# Patient Record
Sex: MALE | Race: WHITE | NOT HISPANIC OR LATINO | Employment: OTHER | URBAN - METROPOLITAN AREA
[De-identification: names, ages, dates, MRNs, and addresses within clinical notes are randomized per-mention and may not be internally consistent; named-entity substitution may affect disease eponyms.]

---

## 2017-01-12 ENCOUNTER — TELEPHONE (OUTPATIENT)
Dept: INTERNAL MEDICINE | Facility: CLINIC | Age: 82
End: 2017-01-12

## 2017-01-12 RX ORDER — OXYBUTYNIN CHLORIDE 10 MG/1
10 TABLET, EXTENDED RELEASE ORAL DAILY
Qty: 30 TABLET | Refills: 4 | Status: SHIPPED | OUTPATIENT
Start: 2017-01-12 | End: 2017-02-11

## 2017-01-12 RX ORDER — MELOXICAM 15 MG/1
15 TABLET ORAL DAILY
Qty: 30 TABLET | Refills: 3 | Status: SHIPPED | OUTPATIENT
Start: 2017-01-12 | End: 2018-08-25 | Stop reason: SDUPTHER

## 2017-01-12 NOTE — TELEPHONE ENCOUNTER
----- Message from Ana Reyna sent at 1/12/2017  9:09 AM EST -----  Contact: DAYANARA FRASER PH:[phone]  DAYANARA HOLDER CALLING FOR A REFILL FOR OXYBUTYNIN, AND MELOXICAM. HE IS COMPLETELY OUT OF THE OXYBUTYNIN AND WOULD LIKE TO HAVE THIS FILLED TODAY IF POSSIBLE. HE USES THE [facility] by [facility] ON [facility]. HE CAN BE REACHED [phone]

## 2017-01-13 RX ORDER — MELOXICAM 15 MG/1
TABLET ORAL
Qty: 30 TABLET | Refills: 2 | Status: SHIPPED | OUTPATIENT
Start: 2017-01-13 | End: 2017-01-24

## 2017-01-20 RX ORDER — PSEUDOEPHEDRINE HYDROCHLORIDE 120 MG/1
TABLET, FILM COATED, EXTENDED RELEASE ORAL
Qty: 60 TABLET | Refills: 4 | Status: SHIPPED | OUTPATIENT
Start: 2017-01-20 | End: 2017-01-24

## 2017-01-24 ENCOUNTER — OFFICE VISIT (OUTPATIENT)
Dept: INTERNAL MEDICINE | Facility: CLINIC | Age: 82
End: 2017-01-24

## 2017-01-24 VITALS
HEART RATE: 78 BPM | TEMPERATURE: 98.4 F | WEIGHT: 189.4 LBS | HEIGHT: 73 IN | OXYGEN SATURATION: 98 % | RESPIRATION RATE: 28 BRPM | DIASTOLIC BLOOD PRESSURE: 78 MMHG | BODY MASS INDEX: 25.1 KG/M2 | SYSTOLIC BLOOD PRESSURE: 162 MMHG

## 2017-01-24 DIAGNOSIS — J20.9 ACUTE BRONCHITIS, UNSPECIFIED ORGANISM: Primary | ICD-10-CM

## 2017-01-24 PROCEDURE — 99213 OFFICE O/P EST LOW 20 MIN: CPT | Performed by: PHYSICIAN ASSISTANT

## 2017-01-24 PROCEDURE — 94640 AIRWAY INHALATION TREATMENT: CPT | Performed by: PHYSICIAN ASSISTANT

## 2017-01-24 RX ORDER — LEVALBUTEROL INHALATION SOLUTION 0.63 MG/3ML
0.63 SOLUTION RESPIRATORY (INHALATION) EVERY 6 HOURS PRN
Status: DISCONTINUED | OUTPATIENT
Start: 2017-01-24 | End: 2022-01-01

## 2017-01-24 RX ORDER — METHYLPREDNISOLONE 4 MG/1
TABLET ORAL
Qty: 21 TABLET | Refills: 0 | Status: SHIPPED | OUTPATIENT
Start: 2017-01-24 | End: 2017-04-26

## 2017-01-24 RX ORDER — AZITHROMYCIN 250 MG/1
TABLET, FILM COATED ORAL
Qty: 6 TABLET | Refills: 0 | Status: SHIPPED | OUTPATIENT
Start: 2017-01-24 | End: 2017-04-26

## 2017-01-24 NOTE — PROGRESS NOTES
Subjective   Cooper Covarrubias is a 85 y.o. male.   Chief Complaint   Patient presents with   • Cough     Patient states symptoms started two days ago       History of Present Illness     Pt here with a cough which started 2 days ago.  He takes advair twice daily and incruse for COPD.  He sees Dr. Shah (pulmonologist).  He denies body aches, sore throat, ear pain, fever or chills.  Feels like he can't get a deep breath.     The following portions of the patient's history were reviewed and updated as appropriate: allergies, current medications, past family history, past medical history, past social history, past surgical history and problem list.    Review of Systems   Constitutional: Negative.    HENT: Negative.    Respiratory: Positive for cough and wheezing.    Cardiovascular: Negative.    Gastrointestinal: Negative.    Genitourinary: Negative.    Musculoskeletal: Negative.    Neurological: Negative.    Psychiatric/Behavioral: Negative.        Objective   Physical Exam   Constitutional: He is oriented to person, place, and time. He appears well-developed and well-nourished.   HENT:   Right Ear: External ear normal.   Left Ear: External ear normal.   Neck: Normal range of motion. Neck supple.   Cardiovascular: Normal rate, regular rhythm and normal heart sounds.    Pulmonary/Chest: Effort normal. No respiratory distress. He has wheezes.   Neurological: He is alert and oriented to person, place, and time.   Psychiatric: He has a normal mood and affect. Judgment normal.   Vitals reviewed.    Patient was given xopenex nebulizer in office today and feels better.    Assessment/Plan   Cooper was seen today for cough.    Diagnoses and all orders for this visit:    Acute bronchitis, unspecified organism  -     azithromycin (ZITHROMAX) 250 MG tablet; Take 2 tablets the first day, then 1 tablet daily for 4 days.  -     MethylPREDNISolone (MEDROL, EDEL,) 4 MG tablet; Take as directed on package instructions.  -     levalbuterol  (XOPENEX) nebulizer solution 0.63 mg; Take 3 mL by nebulization Every 6 (Six) Hours As Needed for wheezing.

## 2017-01-24 NOTE — MR AVS SNAPSHOT
Cooper Covarrubias   1/24/2017 10:30 AM   Office Visit    Provider:  MARGIE Johnston   Department:  [facility] INTERNAL MEDICINE AND PEDIATRICS   Dept Phone:  [phone]                Your Full Care Plan              Today's Medication Changes          These changes are accurate as of: 1/24/17 11:31 AM.  If you have any questions, ask your nurse or doctor.               New Medication(s)Ordered:     azithromycin 250 MG tablet   Commonly known as:  ZITHROMAX   Take 2 tablets the first day, then 1 tablet daily for 4 days.   Started by:  MARGIE Johnston       MethylPREDNISolone 4 MG tablet   Commonly known as:  MEDROL (EDEL)   Take as directed on package instructions.   Started by:  MARGIE Johnston         Medication(s)that have changed:     meloxicam 15 MG tablet   Commonly known as:  MOBIC   Take 1 tablet by mouth Daily for 30 days.   What changed:  Another medication with the same name was removed. Continue taking this medication, and follow the directions you see here.   Changed by:  Dali Tidwell MA         Stop taking medication(s)listed here:     doxycycline 100 MG capsule   Commonly known as:  MONODOX   Stopped by:  MARGIE Johnston           SUDOGEST 12 HOUR 120 MG 12 hr tablet   Generic drug:  pseudoephedrine   Stopped by:  MARGIE Johnston                Where to Get Your Medications      These medications were sent to [address] - [address] [place] [address] - [phone]  - [phone]   [address] [place] [address]     Phone:  [phone]     azithromycin 250 MG tablet    MethylPREDNISolone 4 MG tablet                  Your Updated Medication List          This list is accurate as of: 1/24/17 11:31 AM.  Always use your most recent med list.                ADVAIR HFA 45-21 MCG/ACT inhaler   Generic drug:  fluticasone-salmeterol   INHALE TWO PUFFS BY MOUTH EVERY 12 HOURS       azithromycin 250 MG  tablet   Commonly known as:  ZITHROMAX   Take 2 tablets the first day, then 1 tablet daily for 4 days.       fluticasone 50 MCG/ACT nasal spray   Commonly known as:  FLONASE   PLACE ONE TO TWO SPRAYS IN EACH NOSTRIL ONCE DAILY .       indomethacin 50 MG capsule   Commonly known as:  INDOCIN       LORazepam 0.5 MG tablet   Commonly known as:  ATIVAN   Take one tablet by mohth TID as needed       meloxicam 15 MG tablet   Commonly known as:  MOBIC   Take 1 tablet by mouth Daily for 30 days.       MethylPREDNISolone 4 MG tablet   Commonly known as:  MEDROL (EDEL)   Take as directed on package instructions.       mupirocin 2 % cream   Commonly known as:  BACTROBAN   Apply  topically 3 (three) times a day.       oxybutynin XL 10 MG 24 hr tablet   Commonly known as:  DITROPAN-XL   Take 1 tablet by mouth Daily for 30 days.       PROAIR  (90 BASE) MCG/ACT inhaler   Generic drug:  albuterol       sertraline 50 MG tablet   Commonly known as:  ZOLOFT   Take 1 tablet by mouth daily. For: Anxiety       tamsulosin 0.4 MG capsule 24 hr capsule   Commonly known as:  FLOMAX   TAKE ONE CAPSULE BY MOUTH DAILY       tobramycin 0.3 % solution ophthalmic solution       Umeclidinium Bromide 62.5 MCG/INH aerosol powder    Commonly known as:  INCRUSE ELLIPTA   Inhale 1 puff daily.               You Were Diagnosed With        Codes Comments    Acute bronchitis, unspecified organism    -  Primary ICD-10-CM: J20.9  ICD-9-CM: 466.0       Instructions     None    Patient Instructions History      [facility] Signup     [facility] [employer] allows you to send messages to your doctor, view your test results, renew your prescriptions, schedule appointments, and more. To sign up, go to [employer] and click on the Sign Up Now link in the New User? box. Enter your [employer] Activation Code exactly as it appears below along with the last four digits of your Social Security Number and your Date of Birth () to complete the sign-up  "process. If you do not sign up before the expiration date, you must request a new code.    [employer] Activation Code: [email or web address]  Expires: 2/7/2017 11:30 AM    If you have questions, you can email [email or web address] or call [phone] to talk to our [email or web address] staff. Remember, [email or web address] is NOT to be used for urgent needs. For medical emergencies, dial 911.               Other Info from Your Visit           Your Appointments     Mar 22, 2017  8:00 AM EDT   Physical with Lb Nielsen MD   [facility] INTERNAL MEDICINE AND PEDIATRICS (--)    [address] [address] [phone]           Arrive 15 minutes prior to appointment.              Allergies     No Known Allergies      Reason for Visit     Cough Patient states symptoms started two days ago      Vital Signs     Blood Pressure Pulse Temperature Respirations Height Weight    162/78 (BP Location: Right arm, Patient Position: Sitting) 78 98.4 °F (36.9 °C) (Temporal Artery ) 28 73\" (185.4 cm) 189 lb 6.4 oz (85.9 kg)    Oxygen Saturation Body Mass Index Smoking Status             98% 24.99 kg/m2 Former Smoker         Problems and Diagnoses Noted     Acute bronchitis      "

## 2017-02-02 DIAGNOSIS — F41.9 ANXIETY: ICD-10-CM

## 2017-02-06 RX ORDER — LORAZEPAM 0.5 MG/1
TABLET ORAL
Qty: 90 TABLET | Refills: 0 | Status: SHIPPED | OUTPATIENT
Start: 2017-02-06 | End: 2017-03-18 | Stop reason: SDUPTHER

## 2017-02-10 RX ORDER — FLUTICASONE PROPIONATE AND SALMETEROL XINAFOATE 45; 21 UG/1; UG/1
AEROSOL, METERED RESPIRATORY (INHALATION)
Qty: 12 G | Refills: 4 | Status: SHIPPED | OUTPATIENT
Start: 2017-02-10 | End: 2017-08-17 | Stop reason: SDUPTHER

## 2017-03-18 DIAGNOSIS — F41.9 ANXIETY: ICD-10-CM

## 2017-03-21 RX ORDER — LORAZEPAM 0.5 MG/1
TABLET ORAL
Qty: 90 TABLET | Refills: 0 | Status: SHIPPED | OUTPATIENT
Start: 2017-03-21 | End: 2017-04-26 | Stop reason: SDUPTHER

## 2017-04-13 RX ORDER — FLUTICASONE PROPIONATE 50 MCG
SPRAY, SUSPENSION (ML) NASAL
Qty: 1 BOTTLE | Refills: 1 | Status: SHIPPED | OUTPATIENT
Start: 2017-04-13 | End: 2017-06-24 | Stop reason: SDUPTHER

## 2017-04-26 ENCOUNTER — OFFICE VISIT (OUTPATIENT)
Dept: INTERNAL MEDICINE | Facility: CLINIC | Age: 82
End: 2017-04-26

## 2017-04-26 VITALS
SYSTOLIC BLOOD PRESSURE: 142 MMHG | DIASTOLIC BLOOD PRESSURE: 64 MMHG | RESPIRATION RATE: 16 BRPM | WEIGHT: 185.13 LBS | BODY MASS INDEX: 24.53 KG/M2 | HEART RATE: 76 BPM | HEIGHT: 73 IN

## 2017-04-26 DIAGNOSIS — F41.9 ANXIETY: ICD-10-CM

## 2017-04-26 DIAGNOSIS — Z00.00 INITIAL MEDICARE ANNUAL WELLNESS VISIT: Primary | ICD-10-CM

## 2017-04-26 DIAGNOSIS — Z00.00 WELL ADULT EXAM: ICD-10-CM

## 2017-04-26 DIAGNOSIS — Z13.220 LIPID SCREENING: ICD-10-CM

## 2017-04-26 DIAGNOSIS — F51.01 PRIMARY INSOMNIA: ICD-10-CM

## 2017-04-26 LAB
ALBUMIN SERPL-MCNC: 4.2 G/DL (ref 3.2–4.8)
ALBUMIN/GLOB SERPL: 1.6 G/DL (ref 1.5–2.5)
ALP SERPL-CCNC: 81 U/L (ref 25–100)
ALT SERPL-CCNC: 16 U/L (ref 7–40)
AST SERPL-CCNC: 27 U/L (ref 0–33)
BILIRUB SERPL-MCNC: 0.6 MG/DL (ref 0.3–1.2)
BUN SERPL-MCNC: 15 MG/DL (ref 9–23)
BUN/CREAT SERPL: 13.6 (ref 7–25)
CALCIUM SERPL-MCNC: 9.5 MG/DL (ref 8.7–10.4)
CHLORIDE SERPL-SCNC: 105 MMOL/L (ref 99–109)
CHOLEST SERPL-MCNC: 177 MG/DL (ref 0–200)
CO2 SERPL-SCNC: 29 MMOL/L (ref 20–31)
CREAT SERPL-MCNC: 1.1 MG/DL (ref 0.6–1.3)
ERYTHROCYTE [DISTWIDTH] IN BLOOD BY AUTOMATED COUNT: 15.7 % (ref 11.3–14.5)
GLOBULIN SER CALC-MCNC: 2.6 GM/DL
GLUCOSE SERPL-MCNC: 81 MG/DL (ref 70–100)
HCT VFR BLD AUTO: 35.1 % (ref 38.9–50.9)
HDLC SERPL-MCNC: 54 MG/DL (ref 40–60)
HGB BLD-MCNC: 10.8 G/DL (ref 13.1–17.5)
LDLC SERPL CALC-MCNC: 106 MG/DL (ref 0–100)
MCH RBC QN AUTO: 25.6 PG (ref 27–31)
MCHC RBC AUTO-ENTMCNC: 30.8 G/DL (ref 32–36)
MCV RBC AUTO: 83.2 FL (ref 80–99)
PLATELET # BLD AUTO: 213 10*3/MM3 (ref 150–450)
POTASSIUM SERPL-SCNC: 4.3 MMOL/L (ref 3.5–5.5)
PROT SERPL-MCNC: 6.8 G/DL (ref 5.7–8.2)
RBC # BLD AUTO: 4.22 10*6/MM3 (ref 4.2–5.76)
SODIUM SERPL-SCNC: 140 MMOL/L (ref 132–146)
T4 FREE SERPL-MCNC: 0.9 NG/DL (ref 0.89–1.76)
TRIGL SERPL-MCNC: 86 MG/DL (ref 0–150)
TSH SERPL DL<=0.005 MIU/L-ACNC: 2.48 MIU/ML (ref 0.35–5.35)
VLDLC SERPL CALC-MCNC: 17.2 MG/DL
WBC # BLD AUTO: 5.75 10*3/MM3 (ref 3.5–10.8)

## 2017-04-26 PROCEDURE — 36415 COLL VENOUS BLD VENIPUNCTURE: CPT | Performed by: INTERNAL MEDICINE

## 2017-04-26 PROCEDURE — G0439 PPPS, SUBSEQ VISIT: HCPCS | Performed by: INTERNAL MEDICINE

## 2017-04-26 PROCEDURE — 99397 PER PM REEVAL EST PAT 65+ YR: CPT | Performed by: INTERNAL MEDICINE

## 2017-04-26 PROCEDURE — 96160 PT-FOCUSED HLTH RISK ASSMT: CPT | Performed by: INTERNAL MEDICINE

## 2017-04-26 RX ORDER — OXYBUTYNIN CHLORIDE 10 MG/1
TABLET, EXTENDED RELEASE ORAL
COMMUNITY
Start: 2017-04-06 | End: 2017-04-26 | Stop reason: SDUPTHER

## 2017-04-26 RX ORDER — TAMSULOSIN HYDROCHLORIDE 0.4 MG/1
1 CAPSULE ORAL DAILY
Qty: 90 CAPSULE | Refills: 3 | Status: SHIPPED | OUTPATIENT
Start: 2017-04-26 | End: 2018-04-23 | Stop reason: SDUPTHER

## 2017-04-26 RX ORDER — OXYBUTYNIN CHLORIDE 10 MG/1
10 TABLET, EXTENDED RELEASE ORAL DAILY
Qty: 90 TABLET | Refills: 3 | Status: SHIPPED | OUTPATIENT
Start: 2017-04-26 | End: 2018-04-23 | Stop reason: SDUPTHER

## 2017-04-26 RX ORDER — LORAZEPAM 0.5 MG/1
TABLET ORAL
Qty: 60 TABLET | Refills: 2 | Status: SHIPPED | OUTPATIENT
Start: 2017-04-26 | End: 2017-08-02 | Stop reason: SDUPTHER

## 2017-04-26 NOTE — PROGRESS NOTES
QUICK REFERENCE INFORMATION:  The ABCs of the Annual Wellness Visit    Initial Medicare Wellness Visit    HEALTH RISK ASSESSMENT    8/11/1931    Recent Hospitalizations:  No recent admission in the last 6-12 months        Current Medical Providers:  Patient Care Team:  Lb Nielsen MD as PCP - General  Lb Nielsen MD as PCP - Family Medicine  Ulysses Patrick MD as Consulting Physician (Dermatology)        Smoking Status:  History   Smoking Status   • Former Smoker   • Packs/day: 2.00   • Years: 30.00   Smokeless Tobacco   • Never Used       Alcohol Consumption:  History   Alcohol Use No       Depression Screen:   PHQ-9 Depression Screening 4/26/2017   Little interest or pleasure in doing things 0   Feeling down, depressed, or hopeless 0   Trouble falling or staying asleep, or sleeping too much 0   Feeling tired or having little energy 0   Poor appetite or overeating 0   Feeling bad about yourself - or that you are a failure or have let yourself or your family down 0   Trouble concentrating on things, such as reading the newspaper or watching television 0   Moving or speaking so slowly that other people could have noticed. Or the opposite - being so fidgety or restless that you have been moving around a lot more than usual 0   Thoughts that you would be better off dead, or of hurting yourself in some way 0   PHQ-9 Total Score 0   If you checked off any problems, how difficult have these problems made it for you to do your work, take care of things at home, or get along with other people? Not difficult at all       Health Habits and Functional and Cognitive Screening:  Functional & Cognitive Status 4/26/2017   Do you have difficulty preparing food and eating? No   Do you have difficulty bathing yourself? No   Do you have difficulty getting dressed? No   Do you have difficulty using the toilet? No   Do you have difficulty moving around from place to place? No   In the past year have you fallen or experienced a near  fall? No   Do you need help using the phone?  No   Are you deaf or do you have serious difficulty hearing?  No   Do you need help with transportation? No   Do you need help shopping? No   Do you need help preparing meals?  No   Do you need help with housework?  No   Do you need help with laundry? No   Do you need help taking your medications? No   Do you need help managing money? No   Do you have difficulty concentrating, remembering or making decisions? No       Health Habits  Current Diet: Well Balanced Diet  Dental Exam: Up to date  Eye Exam: Up to date  Exercise (times per week): 3 times per week  Current Exercise Activities Include: Walking          Does the patient have evidence of cognitive impairment? No    Asiprin use counseling: Start ASA 81 mg daily       Recent Lab Results:    Visual Acuity:  No exam data present    Age-appropriate Screening Schedule:  Refer to the list below for future screening recommendations based on patient's age, sex and/or medical conditions. Orders for these recommended tests are listed in the plan section. The patient has been provided with a written plan.    Health Maintenance   Topic Date Due   • TDAP/TD VACCINES (1 - Tdap) 08/11/1950   • PNEUMOCOCCAL VACCINES (65+ LOW/MEDIUM RISK) (1 of 2 - PCV13) 08/11/1996   • ZOSTER VACCINE  04/27/2016   • INFLUENZA VACCINE  08/01/2016        Subjective   History of Present Illness    Cooper Covarrubias is a 85 y.o. male who presents for an Annual Wellness Visit.    The following portions of the patient's history were reviewed and updated as appropriate: current medications and problem list.    Outpatient Medications Prior to Visit   Medication Sig Dispense Refill   • ADVAIR HFA 45-21 MCG/ACT inhaler INHALE TWO PUFFS BY MOUTH EVERY 12 HOURS 12 g 4   • albuterol (PROAIR HFA) 108 (90 BASE) MCG/ACT inhaler ProAir  (90 Base) MCG/ACT Inhalation Aerosol Solution; Patient Sig: ProAir  (90 Base) MCG/ACT Inhalation Aerosol Solution  INHALE ONE TO TWO PUFFS BY MOUTH EVERY 6 HOURS AS NEEDED; 8.5; 1; 06-Aug-2014; Active     • fluticasone (FLONASE) 50 MCG/ACT nasal spray PLACE ONE TO TWO SPRAYS IN EACH NOSTRIL ONCE DAILY 1 bottle 1   • LORazepam (ATIVAN) 0.5 MG tablet TAKE ONE TABLET BY MOUTH THREE TIMES A DAY AS NEEDED 90 tablet 0   • sertraline (ZOLOFT) 50 MG tablet Take 1 tablet by mouth daily. For: Anxiety 90 tablet 4   • tamsulosin (FLOMAX) 0.4 MG capsule 24 hr capsule TAKE ONE CAPSULE BY MOUTH DAILY 30 capsule 4   • tobramycin 0.3 % solution ophthalmic solution Administer 2-3 drops to the right eye every evening.     • Umeclidinium Bromide (INCRUSE ELLIPTA) 62.5 MCG/INH aerosol powder  Inhale 1 puff daily. 4 each 3   • azithromycin (ZITHROMAX) 250 MG tablet Take 2 tablets the first day, then 1 tablet daily for 4 days. 6 tablet 0   • indomethacin (INDOCIN) 50 MG capsule Take 50 mg by mouth 2 (two) times a day. For: Arthritis     • MethylPREDNISolone (MEDROL, EDEL,) 4 MG tablet Take as directed on package instructions. 21 tablet 0   • mupirocin (BACTROBAN) 2 % cream Apply  topically 3 (three) times a day. 30 g 4     Facility-Administered Medications Prior to Visit   Medication Dose Route Frequency Provider Last Rate Last Dose   • levalbuterol (XOPENEX) nebulizer solution 0.63 mg  0.63 mg Nebulization Q6H PRN MARGIE Johnston           Patient Active Problem List   Diagnosis   • Acute bronchitis   • Asthma   • Dyspnea   • Anxiety   • Sinus infection   • Tongue symptom   • Urinary incontinence   • Onychomycosis   • BPH with urinary obstruction   • Nocturia       Advance Care Planning:  has an advance directive - a copy HAS NOT been provided    Identification of Risk Factors:  Risk factors include: inactivity, chronic pain and depression.    Review of Systems    Compared to one year ago, the patient feels his physical health is the same.  Compared to one year ago, the patient feels his mental health is the same.    Objective     Physical  "Exam    Vitals:    04/26/17 1051   BP: 142/64   BP Location: Right arm   Patient Position: Sitting   Pulse: 76   Resp: 16   Weight: 185 lb 2 oz (84 kg)   Height: 73\" (185.4 cm)   PainSc:   6   PainLoc: Hand       Finger Rub Hearing{Test (right ear):passed  Finger Rub Hearing{Test (left ear):passed      Body mass index is 24.42 kg/(m^2).  Discussed the patient's BMI with him. The BMI is in the acceptable range.    Assessment/Plan   Patient Self-Management and Personalized Health Advice  The patient has been provided with information about: exercise and prevention of cardiac or vascular disease and preventive services including:   · Advance directive, Fall Risk assessment done, Fall Risk plan of care done, Nutrition counseling provided, Urinary Incontinence assessment done.    Visit Diagnoses:  No diagnosis found.    No orders of the defined types were placed in this encounter.      Outpatient Encounter Prescriptions as of 4/26/2017   Medication Sig Dispense Refill   • ADVAIR HFA 45-21 MCG/ACT inhaler INHALE TWO PUFFS BY MOUTH EVERY 12 HOURS 12 g 4   • albuterol (PROAIR HFA) 108 (90 BASE) MCG/ACT inhaler ProAir  (90 Base) MCG/ACT Inhalation Aerosol Solution; Patient Sig: ProAir  (90 Base) MCG/ACT Inhalation Aerosol Solution INHALE ONE TO TWO PUFFS BY MOUTH EVERY 6 HOURS AS NEEDED; 8.5; 1; 06-Aug-2014; Active     • fluticasone (FLONASE) 50 MCG/ACT nasal spray PLACE ONE TO TWO SPRAYS IN EACH NOSTRIL ONCE DAILY 1 bottle 1   • LORazepam (ATIVAN) 0.5 MG tablet TAKE ONE TABLET BY MOUTH THREE TIMES A DAY AS NEEDED 90 tablet 0   • sertraline (ZOLOFT) 50 MG tablet Take 1 tablet by mouth daily. For: Anxiety 90 tablet 4   • tamsulosin (FLOMAX) 0.4 MG capsule 24 hr capsule TAKE ONE CAPSULE BY MOUTH DAILY 30 capsule 4   • tobramycin 0.3 % solution ophthalmic solution Administer 2-3 drops to the right eye every evening.     • Umeclidinium Bromide (INCRUSE ELLIPTA) 62.5 MCG/INH aerosol powder  Inhale 1 puff daily. 4 " each 3   • oxybutynin XL (DITROPAN-XL) 10 MG 24 hr tablet      • [DISCONTINUED] azithromycin (ZITHROMAX) 250 MG tablet Take 2 tablets the first day, then 1 tablet daily for 4 days. 6 tablet 0   • [DISCONTINUED] indomethacin (INDOCIN) 50 MG capsule Take 50 mg by mouth 2 (two) times a day. For: Arthritis     • [DISCONTINUED] MethylPREDNISolone (MEDROL, EDEL,) 4 MG tablet Take as directed on package instructions. 21 tablet 0   • [DISCONTINUED] mupirocin (BACTROBAN) 2 % cream Apply  topically 3 (three) times a day. 30 g 4     Facility-Administered Encounter Medications as of 4/26/2017   Medication Dose Route Frequency Provider Last Rate Last Dose   • levalbuterol (XOPENEX) nebulizer solution 0.63 mg  0.63 mg Nebulization Q6H PRN MARGIE Johnston           Reviewed use of high risk medication in the elderly: yes  Reviewed for potential of harmful drug interactions in the elderly: yes    Follow Up:  No Follow-up on file.     An After Visit Summary and PPPS with all of these plans were given to the patient.

## 2017-04-26 NOTE — PROGRESS NOTES
Subjective   Cooper Covarrubias is a 85 y.o. male.     History of Present Illness     Complete Physical     1 Anxiety- chronic and controlled.  Patient says that he is doing very well and has had no new issues in regards to his anxiety       Diet: Regular    Exercise: Walks at least 3 times a week    Social history: No EtOH consumption, no serious smoking, no marijuana, no illicit drugs.    Review of Systems   All other systems reviewed and are negative.      Objective   Physical Exam   Constitutional: He is oriented to person, place, and time. He appears well-developed and well-nourished.   HENT:   Head: Normocephalic.   Right Ear: External ear normal.   Left Ear: External ear normal.   Nose: Nose normal.   Mouth/Throat: Oropharynx is clear and moist.   Eyes: Conjunctivae and EOM are normal. Pupils are equal, round, and reactive to light.   Neck: Normal range of motion. Neck supple.   Cardiovascular: Normal rate, regular rhythm, normal heart sounds and intact distal pulses.    Pulmonary/Chest: Effort normal and breath sounds normal.   Abdominal: Soft. Bowel sounds are normal.   Musculoskeletal: Normal range of motion.   Neurological: He is alert and oriented to person, place, and time. He has normal reflexes.   Skin: Skin is warm.   Psychiatric: He has a normal mood and affect. His behavior is normal. Judgment and thought content normal.   Nursing note and vitals reviewed.      Assessment/Plan   Cooper was seen today for annual exam.    Diagnoses and all orders for this visit:    Initial Medicare annual wellness visit    Well adult exam    Anxiety  -     LORazepam (ATIVAN) 0.5 MG tablet; Take one tablet po bid prn  -     CBC (No Diff)  -     Comprehensive Metabolic Panel  -     T4, Free  -     TSH    Lipid screening  -     Lipid Panel    Primary insomnia   -     CBC (No Diff)    Anticipatory guidance:  Recommend routine ophthalmology exam.  Recommend routine dental visit

## 2017-06-26 RX ORDER — FLUTICASONE PROPIONATE 50 MCG
SPRAY, SUSPENSION (ML) NASAL
Qty: 1 BOTTLE | Refills: 3 | Status: SHIPPED | OUTPATIENT
Start: 2017-06-26 | End: 2017-10-24 | Stop reason: SDUPTHER

## 2017-08-02 DIAGNOSIS — F41.9 ANXIETY: ICD-10-CM

## 2017-08-02 RX ORDER — LORAZEPAM 0.5 MG/1
TABLET ORAL
Qty: 60 TABLET | Refills: 2 | OUTPATIENT
Start: 2017-08-02 | End: 2017-11-08 | Stop reason: SDUPTHER

## 2017-08-17 RX ORDER — FLUTICASONE PROPIONATE AND SALMETEROL XINAFOATE 45; 21 UG/1; UG/1
AEROSOL, METERED RESPIRATORY (INHALATION)
Refills: 3 | Status: CANCELLED | OUTPATIENT
Start: 2017-08-17

## 2017-09-14 ENCOUNTER — OFFICE VISIT (OUTPATIENT)
Dept: INTERNAL MEDICINE | Facility: CLINIC | Age: 82
End: 2017-09-14

## 2017-09-14 VITALS
SYSTOLIC BLOOD PRESSURE: 144 MMHG | BODY MASS INDEX: 24.08 KG/M2 | WEIGHT: 182.5 LBS | RESPIRATION RATE: 8 BRPM | HEART RATE: 54 BPM | DIASTOLIC BLOOD PRESSURE: 74 MMHG | TEMPERATURE: 97.3 F

## 2017-09-14 DIAGNOSIS — F33.42 RECURRENT MAJOR DEPRESSIVE DISORDER, IN FULL REMISSION (HCC): ICD-10-CM

## 2017-09-14 DIAGNOSIS — J41.1 MUCOPURULENT CHRONIC BRONCHITIS (HCC): ICD-10-CM

## 2017-09-14 DIAGNOSIS — Z13.1 SCREENING FOR DIABETES MELLITUS: ICD-10-CM

## 2017-09-14 DIAGNOSIS — F41.9 ANXIETY: Primary | ICD-10-CM

## 2017-09-14 DIAGNOSIS — R79.9 ABNORMAL FINDING OF BLOOD CHEMISTRY: ICD-10-CM

## 2017-09-14 DIAGNOSIS — Z13.220 LIPID SCREENING: ICD-10-CM

## 2017-09-14 LAB
CHOLEST SERPL-MCNC: 184 MG/DL (ref 0–200)
EXPIRATION DATE: NORMAL
EXPIRATION DATE: NORMAL
GLUCOSE BLDC GLUCOMTR-MCNC: 86 MG/DL (ref 70–130)
HBA1C MFR BLD: 5.7 %
HDLC SERPL-MCNC: 54 MG/DL (ref 40–60)
LDLC SERPL CALC-MCNC: 114 MG/DL (ref 0–100)
Lab: NORMAL
Lab: NORMAL
TRIGL SERPL-MCNC: 82 MG/DL (ref 0–150)
VLDLC SERPL CALC-MCNC: 16.4 MG/DL

## 2017-09-14 PROCEDURE — 99214 OFFICE O/P EST MOD 30 MIN: CPT | Performed by: INTERNAL MEDICINE

## 2017-09-14 PROCEDURE — 83036 HEMOGLOBIN GLYCOSYLATED A1C: CPT | Performed by: INTERNAL MEDICINE

## 2017-09-14 PROCEDURE — 36415 COLL VENOUS BLD VENIPUNCTURE: CPT | Performed by: INTERNAL MEDICINE

## 2017-09-14 PROCEDURE — 82962 GLUCOSE BLOOD TEST: CPT | Performed by: INTERNAL MEDICINE

## 2017-09-14 RX ORDER — FLUOROMETHOLONE 0.1 %
SUSPENSION, DROPS(FINAL DOSAGE FORM)(ML) OPHTHALMIC (EYE)
COMMUNITY
Start: 2017-08-21 | End: 2021-01-01

## 2017-09-14 RX ORDER — OXYBUTYNIN CHLORIDE 10 MG/1
TABLET, EXTENDED RELEASE ORAL
COMMUNITY
Start: 2017-08-06 | End: 2019-03-20

## 2017-09-14 RX ORDER — MELOXICAM 15 MG/1
TABLET ORAL DAILY PRN
COMMUNITY
Start: 2017-07-15 | End: 2019-03-20

## 2017-09-14 NOTE — PROGRESS NOTES
Subjective   Cooper Covarrubias is a 86 y.o. male.     History of Present Illness       1 anxiety-chronic and stable.  Patient says that his anxiety and emotional well-being has been well-controlled.  Patient denies any recent panic attacks, worrying episodes, or any other mental issues at this time.  His symptoms are controlled on the Ativan and he would like to continue these medications as scheduled.     2 depression-chronic and stable.  Patient says that his depression has been well-controlled and he said no new issues in regards to his emotional health.  He continues on the sertraline 50 mg by mouth once a day and has had no side effects.  Patient says overall he's been doing very well in regards to his quality of life on these medications.     3 COPD- chronic and stable.  Patient says that since initiating the new inhalers for his respiratory health he has been able to breathe a lot better and he finds that he has more energy when he is performing physical activity.  Patient denies any dyspnea, chest pain, nausea, vomiting, headache, fever, chills, any other systemic symptoms.         Review of Systems   All other systems reviewed and are negative.      Objective   Physical Exam   Constitutional: He appears well-developed and well-nourished.   HENT:   Head: Normocephalic.   Right Ear: External ear normal.   Left Ear: External ear normal.   Nose: Nose normal.   Mouth/Throat: Oropharynx is clear and moist.   Eyes: Conjunctivae and EOM are normal. Pupils are equal, round, and reactive to light.   Neck: Normal range of motion. Neck supple.   Cardiovascular: Normal rate, regular rhythm, normal heart sounds and intact distal pulses.    Pulmonary/Chest: Effort normal and breath sounds normal.   Abdominal: Soft. Bowel sounds are normal.   Musculoskeletal: Normal range of motion.   Nursing note and vitals reviewed.      Assessment/Plan   Cooper was seen today for follow-up.    Diagnoses and all orders for this  visit:    Anxiety-mentally, currently doing very well and continue on current medications.    Recurrent major depressive disorder, in full remission    Mucopurulent chronic bronchitis-continue on current inhaler medications.    Lipid screening  -     Lipid Panel    Screening for diabetes mellitus  -     POC Glycosylated Hemoglobin (Hb A1C)  -     POC Glucose Fingerstick    Abnormal finding of blood chemistry   -     POC Glycosylated Hemoglobin (Hb A1C)  -     POC Glucose Fingerstick

## 2017-09-18 ENCOUNTER — TELEPHONE (OUTPATIENT)
Dept: INTERNAL MEDICINE | Facility: CLINIC | Age: 82
End: 2017-09-18

## 2017-09-18 DIAGNOSIS — H54.7 VISION PROBLEMS: Primary | ICD-10-CM

## 2017-09-18 NOTE — TELEPHONE ENCOUNTER
----- Message from Deysi Lawson sent at 9/18/2017 11:41 AM EDT -----  [phone]    PT HAS BEEN GOING TO DR GILES SINCE June AT [facility] [address]  HE HAS GOTTEN A BILL AND APPARENTLY THEY NEVER RECEIVED A REFERRAL.  HE NEEDS ONE AND NEEDS IT BACKDATED

## 2017-09-18 NOTE — TELEPHONE ENCOUNTER
Zoey, I did put in a referral for ophthalmology hopefully this can be back dated to help with patient's billing issues.

## 2017-10-24 DIAGNOSIS — F41.9 ANXIETY: ICD-10-CM

## 2017-10-24 RX ORDER — FLUTICASONE PROPIONATE 50 MCG
SPRAY, SUSPENSION (ML) NASAL
Qty: 16 G | Refills: 2 | Status: SHIPPED | OUTPATIENT
Start: 2017-10-24 | End: 2018-02-07 | Stop reason: SDUPTHER

## 2017-11-08 ENCOUNTER — TELEPHONE (OUTPATIENT)
Dept: INTERNAL MEDICINE | Facility: CLINIC | Age: 82
End: 2017-11-08

## 2017-11-08 DIAGNOSIS — F41.9 ANXIETY: ICD-10-CM

## 2017-11-08 RX ORDER — LORAZEPAM 0.5 MG/1
TABLET ORAL
Qty: 60 TABLET | Refills: 2 | OUTPATIENT
Start: 2017-11-08 | End: 2018-01-31 | Stop reason: SDUPTHER

## 2017-11-08 NOTE — TELEPHONE ENCOUNTER
RX called into pharmacy line. Spoke with Pt and informed them of this. Pt verbalized understanding and much appr'c.

## 2018-01-31 ENCOUNTER — TELEPHONE (OUTPATIENT)
Dept: INTERNAL MEDICINE | Facility: CLINIC | Age: 83
End: 2018-01-31

## 2018-01-31 DIAGNOSIS — F41.9 ANXIETY: ICD-10-CM

## 2018-02-01 NOTE — TELEPHONE ENCOUNTER
Please advise. Pt last seen 9-14-17 , has fu on 3-19-18 . Last rx 11-8-17 #60 2R . Needs updated CSA, RUY, and UDS

## 2018-02-02 RX ORDER — LORAZEPAM 0.5 MG/1
TABLET ORAL
Qty: 60 TABLET | Refills: 1 | Status: SHIPPED | OUTPATIENT
Start: 2018-02-02 | End: 2018-05-30 | Stop reason: SDUPTHER

## 2018-02-02 NOTE — TELEPHONE ENCOUNTER
Patient called in regards to his prescription not being called in just yet. Patient has requested it be called in ASAP because he is out.     Call back for patient: [phone]

## 2018-02-02 NOTE — TELEPHONE ENCOUNTER
Patient needs to come to office and complete CSA (and UDS) rx placed upfront for patient . Patient informed he needs to come to office    tyrone in your folder

## 2018-02-07 RX ORDER — FLUTICASONE PROPIONATE 50 MCG
SPRAY, SUSPENSION (ML) NASAL
Qty: 1 BOTTLE | Refills: 11 | Status: SHIPPED | OUTPATIENT
Start: 2018-02-07 | End: 2019-02-25 | Stop reason: SDUPTHER

## 2018-02-08 RX ORDER — PSEUDOEPHEDRINE HCL 120 MG/1
120 TABLET, FILM COATED, EXTENDED RELEASE ORAL EVERY 12 HOURS
Qty: 60 TABLET | Refills: 0 | Status: SHIPPED | OUTPATIENT
Start: 2018-02-08 | End: 2018-05-23 | Stop reason: SDUPTHER

## 2018-03-18 RX ORDER — FLUTICASONE PROPIONATE AND SALMETEROL XINAFOATE 45; 21 UG/1; UG/1
AEROSOL, METERED RESPIRATORY (INHALATION)
Refills: 4 | Status: CANCELLED | OUTPATIENT
Start: 2018-03-18

## 2018-03-19 ENCOUNTER — OFFICE VISIT (OUTPATIENT)
Dept: INTERNAL MEDICINE | Facility: CLINIC | Age: 83
End: 2018-03-19

## 2018-03-19 VITALS
HEART RATE: 68 BPM | WEIGHT: 187 LBS | RESPIRATION RATE: 16 BRPM | DIASTOLIC BLOOD PRESSURE: 76 MMHG | SYSTOLIC BLOOD PRESSURE: 128 MMHG | BODY MASS INDEX: 24.67 KG/M2 | TEMPERATURE: 97.7 F

## 2018-03-19 DIAGNOSIS — J44.9 CHRONIC OBSTRUCTIVE PULMONARY DISEASE, UNSPECIFIED COPD TYPE (HCC): Primary | ICD-10-CM

## 2018-03-19 DIAGNOSIS — Z23 NEED FOR PNEUMOCOCCAL VACCINE: ICD-10-CM

## 2018-03-19 DIAGNOSIS — Z13.220 LIPID SCREENING: ICD-10-CM

## 2018-03-19 DIAGNOSIS — Z23 NEED FOR IMMUNIZATION AGAINST INFLUENZA: ICD-10-CM

## 2018-03-19 DIAGNOSIS — F41.9 ANXIETY: ICD-10-CM

## 2018-03-19 DIAGNOSIS — R94.6 ABNORMAL RESULTS OF THYROID FUNCTION STUDIES: ICD-10-CM

## 2018-03-19 PROBLEM — E55.9 VITAMIN D DEFICIENCY: Status: ACTIVE | Noted: 2018-03-19

## 2018-03-19 PROBLEM — M19.90 ARTHRITIS: Status: ACTIVE | Noted: 2018-03-19

## 2018-03-19 LAB
ALBUMIN SERPL-MCNC: 3.8 G/DL (ref 3.2–4.8)
ALBUMIN/GLOB SERPL: 1.3 G/DL (ref 1.5–2.5)
ALP SERPL-CCNC: 77 U/L (ref 25–100)
ALT SERPL-CCNC: 17 U/L (ref 7–40)
AST SERPL-CCNC: 20 U/L (ref 0–33)
BILIRUB SERPL-MCNC: 0.5 MG/DL (ref 0.3–1.2)
BUN SERPL-MCNC: 17 MG/DL (ref 9–23)
BUN/CREAT SERPL: 15.5 (ref 7–25)
CALCIUM SERPL-MCNC: 8.6 MG/DL (ref 8.7–10.4)
CHLORIDE SERPL-SCNC: 107 MMOL/L (ref 99–109)
CHOLEST SERPL-MCNC: 170 MG/DL (ref 0–200)
CO2 SERPL-SCNC: 29 MMOL/L (ref 20–31)
CREAT SERPL-MCNC: 1.1 MG/DL (ref 0.6–1.3)
ERYTHROCYTE [DISTWIDTH] IN BLOOD BY AUTOMATED COUNT: 16 % (ref 11.3–14.5)
GFR SERPLBLD CREATININE-BSD FMLA CKD-EPI: 63 ML/MIN/1.73
GFR SERPLBLD CREATININE-BSD FMLA CKD-EPI: 77 ML/MIN/1.73
GLOBULIN SER CALC-MCNC: 2.9 GM/DL
GLUCOSE SERPL-MCNC: 92 MG/DL (ref 70–100)
HCT VFR BLD AUTO: 34.1 % (ref 38.9–50.9)
HDLC SERPL-MCNC: 54 MG/DL (ref 40–60)
HGB BLD-MCNC: 10.2 G/DL (ref 13.1–17.5)
LDLC SERPL CALC-MCNC: 100 MG/DL (ref 0–100)
MCH RBC QN AUTO: 24.3 PG (ref 27–31)
MCHC RBC AUTO-ENTMCNC: 29.9 G/DL (ref 32–36)
MCV RBC AUTO: 81.2 FL (ref 80–99)
PLATELET # BLD AUTO: 223 10*3/MM3 (ref 150–450)
POTASSIUM SERPL-SCNC: 4.3 MMOL/L (ref 3.5–5.5)
PROT SERPL-MCNC: 6.7 G/DL (ref 5.7–8.2)
RBC # BLD AUTO: 4.2 10*6/MM3 (ref 4.2–5.76)
SODIUM SERPL-SCNC: 141 MMOL/L (ref 132–146)
T4 FREE SERPL-MCNC: 0.89 NG/DL (ref 0.89–1.76)
TRIGL SERPL-MCNC: 79 MG/DL (ref 0–150)
TSH SERPL DL<=0.005 MIU/L-ACNC: 3.81 MIU/ML (ref 0.35–5.35)
VLDLC SERPL CALC-MCNC: 15.8 MG/DL
WBC # BLD AUTO: 4.74 10*3/MM3 (ref 3.5–10.8)

## 2018-03-19 PROCEDURE — 36415 COLL VENOUS BLD VENIPUNCTURE: CPT | Performed by: INTERNAL MEDICINE

## 2018-03-19 PROCEDURE — 99214 OFFICE O/P EST MOD 30 MIN: CPT | Performed by: INTERNAL MEDICINE

## 2018-03-19 NOTE — PROGRESS NOTES
Subjective   Cooper Covarrubias is a 86 y.o. male.     History of Present Illness     COPD- chronic and stable.  Patient says that he has not had any shortness breath, chest pain, nausea, vomiting, headache, fatigue, or any other systemic signs.    2 anxiety-chronic and controlled.  Patient continues on the lorazepam and this has been doing very well.  The control of his emotions and anxiety.       Review of Systems   All other systems reviewed and are negative.      Objective   Physical Exam   Constitutional: He is oriented to person, place, and time. He appears well-developed and well-nourished.   HENT:   Head: Normocephalic.   Right Ear: External ear normal.   Left Ear: External ear normal.   Nose: Nose normal.   Mouth/Throat: Oropharynx is clear and moist.   Eyes: Conjunctivae and EOM are normal. Pupils are equal, round, and reactive to light.   Neck: Normal range of motion. Neck supple.   Cardiovascular: Normal rate, regular rhythm, normal heart sounds and intact distal pulses.    Pulmonary/Chest: Effort normal and breath sounds normal.   Musculoskeletal: Normal range of motion.   Neurological: He is alert and oriented to person, place, and time. He has normal reflexes.   Skin: Skin is warm.   Nursing note and vitals reviewed.      Assessment/Plan   Cooper was seen today for follow-up, copd and anxiety.    Diagnoses and all orders for this visit:    Chronic obstructive pulmonary disease, unspecified COPD type  -     CBC (No Diff)  -     Comprehensive Metabolic Panel  -     T4, Free  -     TSH    Need for immunization against influenza    Need for pneumococcal vaccine    Lipid screening  -     Lipid Panel    Abnormal results of thyroid function studies   -     T4, Free  -     TSH    Anxiety -stable continue on current medications.

## 2018-04-23 RX ORDER — OXYBUTYNIN CHLORIDE 10 MG/1
TABLET, EXTENDED RELEASE ORAL
Qty: 90 TABLET | Refills: 2 | Status: SHIPPED | OUTPATIENT
Start: 2018-04-23 | End: 2019-01-26 | Stop reason: SDUPTHER

## 2018-04-23 RX ORDER — TAMSULOSIN HYDROCHLORIDE 0.4 MG/1
CAPSULE ORAL
Qty: 90 CAPSULE | Refills: 2 | Status: SHIPPED | OUTPATIENT
Start: 2018-04-23 | End: 2019-01-17 | Stop reason: SDUPTHER

## 2018-04-23 NOTE — TELEPHONE ENCOUNTER
Does not look like we have prescribed oxybutin, tamsulosin not on current med list. Patient last seen 3/19/2018. Please advise

## 2018-05-25 RX ORDER — PSEUDOEPHEDRINE HYDROCHLORIDE 120 MG/1
TABLET, FILM COATED, EXTENDED RELEASE ORAL
Qty: 60 TABLET | Refills: 0 | Status: SHIPPED | OUTPATIENT
Start: 2018-05-25 | End: 2018-08-23 | Stop reason: SDUPTHER

## 2018-05-30 DIAGNOSIS — F41.9 ANXIETY: ICD-10-CM

## 2018-05-30 NOTE — TELEPHONE ENCOUNTER
LOV:03/19/18  Next appt: 09/18/18  Last refill: 02/02/18 #60 1RF 1 tablet PO BID    Ok for refill?

## 2018-05-31 RX ORDER — LORAZEPAM 0.5 MG/1
TABLET ORAL
Qty: 60 TABLET | Refills: 2 | Status: SHIPPED | OUTPATIENT
Start: 2018-05-31 | End: 2018-08-28 | Stop reason: SDUPTHER

## 2018-07-16 DIAGNOSIS — L03.115 CELLULITIS OF RIGHT LOWER EXTREMITY: ICD-10-CM

## 2018-07-16 RX ORDER — MUPIROCIN CALCIUM 20 MG/G
CREAM TOPICAL
Qty: 30 G | Refills: 3 | Status: SHIPPED | OUTPATIENT
Start: 2018-07-16 | End: 2019-03-20

## 2018-08-10 ENCOUNTER — TELEPHONE (OUTPATIENT)
Dept: INTERNAL MEDICINE | Facility: CLINIC | Age: 83
End: 2018-08-10

## 2018-08-10 NOTE — TELEPHONE ENCOUNTER
Per Ana Reyna in front office, patient returned call and states that he talked to his pharmacy and this was related to an insurance problem that they have already taken care of.  Patient states that he does not need a refill at this time.

## 2018-08-10 NOTE — TELEPHONE ENCOUNTER
----- Message from Laverne Benton LPN sent at 8/10/2018  9:09 AM EDT -----  Patient calling to request refill of advair inhaler to be sent to [facility].     Patient call back number is [phone].

## 2018-08-24 RX ORDER — PSEUDOEPHEDRINE HYDROCHLORIDE 120 MG/1
TABLET, FILM COATED, EXTENDED RELEASE ORAL
Qty: 40 TABLET | Refills: 0 | Status: SHIPPED | OUTPATIENT
Start: 2018-08-24 | End: 2019-03-20

## 2018-08-27 RX ORDER — MELOXICAM 15 MG/1
TABLET ORAL
Qty: 90 TABLET | Refills: 1 | Status: SHIPPED | OUTPATIENT
Start: 2018-08-27 | End: 2019-12-17 | Stop reason: SDUPTHER

## 2018-08-28 DIAGNOSIS — F41.9 ANXIETY: ICD-10-CM

## 2018-08-30 RX ORDER — LORAZEPAM 0.5 MG/1
TABLET ORAL
Qty: 60 TABLET | Refills: 2 | Status: SHIPPED | OUTPATIENT
Start: 2018-08-30 | End: 2018-11-27 | Stop reason: SDUPTHER

## 2018-09-18 ENCOUNTER — OFFICE VISIT (OUTPATIENT)
Dept: INTERNAL MEDICINE | Facility: CLINIC | Age: 83
End: 2018-09-18

## 2018-09-18 VITALS
BODY MASS INDEX: 24.01 KG/M2 | HEART RATE: 68 BPM | RESPIRATION RATE: 16 BRPM | DIASTOLIC BLOOD PRESSURE: 78 MMHG | SYSTOLIC BLOOD PRESSURE: 136 MMHG | WEIGHT: 182 LBS | TEMPERATURE: 97.6 F

## 2018-09-18 DIAGNOSIS — D22.9 NEVUS: ICD-10-CM

## 2018-09-18 DIAGNOSIS — J42 CHRONIC BRONCHITIS, UNSPECIFIED CHRONIC BRONCHITIS TYPE (HCC): ICD-10-CM

## 2018-09-18 DIAGNOSIS — F41.9 ANXIETY: Primary | ICD-10-CM

## 2018-09-18 PROCEDURE — 99214 OFFICE O/P EST MOD 30 MIN: CPT | Performed by: INTERNAL MEDICINE

## 2018-09-18 NOTE — PROGRESS NOTES
Subjective   Cooper Covarrubias is a 87 y.o. male.     History of Present Illness     1 COPD-chronic and stable.  Patient says that he has had no issues with his breathing since being on his inhalers and overall is doing very well.      2 anxiety- chronic patient says that he's doing very well with the control of his emotions on the lorazepam and Zoloft.  No side effects from the medication.  No suicidal ideations, emotional liability of threats towards himself or anybody else, or worsening depression.     3 lesion on scalp-patient had questions concerns of what appears to be sun spots on the scalp.    Past medical history:  Previous skin cancer    Review of Systems   All other systems reviewed and are negative.      Objective   Physical Exam   Constitutional: He is oriented to person, place, and time. He appears well-developed and well-nourished.   HENT:   Head: Normocephalic.   Right Ear: External ear normal.   Left Ear: External ear normal.   Nose: Nose normal.   Mouth/Throat: Oropharynx is clear and moist.   Eyes: Pupils are equal, round, and reactive to light. Conjunctivae and EOM are normal.   Neck: Normal range of motion. Neck supple.   Cardiovascular: Normal rate, regular rhythm, normal heart sounds and intact distal pulses.    Pulmonary/Chest: Effort normal and breath sounds normal.   Musculoskeletal: Normal range of motion.   Neurological: He is alert and oriented to person, place, and time.   Skin: Skin is warm.   Hypopigmented circular lesions on scalp.   Nursing note and vitals reviewed.        Assessment/Plan   Cooper was seen today for follow-up, anxiety and copd.    Diagnoses and all orders for this visit:    Anxiety  -     sertraline (ZOLOFT) 50 MG tablet; Take 1 tablet by mouth Daily.    Chronic bronchitis, unspecified chronic bronchitis type (CMS/HCC)-continue on current inhalers.    Nevus  -     Ambulatory Referral to Dermatology

## 2018-10-18 DIAGNOSIS — J44.9 CHRONIC OBSTRUCTIVE PULMONARY DISEASE, UNSPECIFIED COPD TYPE (HCC): ICD-10-CM

## 2018-10-18 RX ORDER — FLUTICASONE PROPIONATE AND SALMETEROL XINAFOATE 45; 21 UG/1; UG/1
AEROSOL, METERED RESPIRATORY (INHALATION)
Qty: 1 INHALER | Refills: 4 | Status: SHIPPED | OUTPATIENT
Start: 2018-10-18 | End: 2019-03-20 | Stop reason: SDUPTHER

## 2018-11-27 DIAGNOSIS — F41.9 ANXIETY: ICD-10-CM

## 2018-11-27 RX ORDER — LORAZEPAM 0.5 MG/1
TABLET ORAL
Qty: 60 TABLET | Refills: 1 | Status: SHIPPED | OUTPATIENT
Start: 2018-11-27 | End: 2019-03-20

## 2019-01-18 RX ORDER — TAMSULOSIN HYDROCHLORIDE 0.4 MG/1
CAPSULE ORAL
Qty: 90 CAPSULE | Refills: 1 | Status: SHIPPED | OUTPATIENT
Start: 2019-01-18 | End: 2019-07-11 | Stop reason: SDUPTHER

## 2019-01-28 DIAGNOSIS — F41.9 ANXIETY: ICD-10-CM

## 2019-01-28 RX ORDER — LORAZEPAM 0.5 MG/1
TABLET ORAL
Qty: 60 TABLET | Refills: 2 | Status: SHIPPED | OUTPATIENT
Start: 2019-01-28 | End: 2019-04-24 | Stop reason: SDUPTHER

## 2019-01-28 RX ORDER — OXYBUTYNIN CHLORIDE 10 MG/1
TABLET, EXTENDED RELEASE ORAL
Qty: 90 TABLET | Refills: 1 | Status: SHIPPED | OUTPATIENT
Start: 2019-01-28 | End: 2019-07-11 | Stop reason: SDUPTHER

## 2019-02-25 RX ORDER — FLUTICASONE PROPIONATE 50 MCG
SPRAY, SUSPENSION (ML) NASAL
Qty: 1 BOTTLE | Refills: 10 | Status: SHIPPED | OUTPATIENT
Start: 2019-02-25 | End: 2021-01-01

## 2019-03-20 ENCOUNTER — OFFICE VISIT (OUTPATIENT)
Dept: INTERNAL MEDICINE | Facility: CLINIC | Age: 84
End: 2019-03-20

## 2019-03-20 VITALS
SYSTOLIC BLOOD PRESSURE: 130 MMHG | WEIGHT: 188.38 LBS | BODY MASS INDEX: 24.85 KG/M2 | RESPIRATION RATE: 20 BRPM | TEMPERATURE: 96.9 F | HEART RATE: 60 BPM | DIASTOLIC BLOOD PRESSURE: 70 MMHG

## 2019-03-20 DIAGNOSIS — F41.9 ANXIETY: ICD-10-CM

## 2019-03-20 DIAGNOSIS — J44.9 CHRONIC OBSTRUCTIVE PULMONARY DISEASE, UNSPECIFIED COPD TYPE (HCC): ICD-10-CM

## 2019-03-20 DIAGNOSIS — Z13.220 LIPID SCREENING: ICD-10-CM

## 2019-03-20 DIAGNOSIS — J42 CHRONIC BRONCHITIS, UNSPECIFIED CHRONIC BRONCHITIS TYPE (HCC): Primary | ICD-10-CM

## 2019-03-20 DIAGNOSIS — R79.9 ABNORMAL FINDING OF BLOOD CHEMISTRY: ICD-10-CM

## 2019-03-20 LAB
ALBUMIN SERPL-MCNC: 3.92 G/DL (ref 3.2–4.8)
ALBUMIN/GLOB SERPL: 1.6 G/DL (ref 1.5–2.5)
ALP SERPL-CCNC: 86 U/L (ref 25–100)
ALT SERPL-CCNC: 18 U/L (ref 7–40)
AST SERPL-CCNC: 22 U/L (ref 0–33)
BILIRUB SERPL-MCNC: 0.5 MG/DL (ref 0.3–1.2)
BUN SERPL-MCNC: 18 MG/DL (ref 9–23)
BUN/CREAT SERPL: 15.7 (ref 7–25)
CALCIUM SERPL-MCNC: 8.8 MG/DL (ref 8.7–10.4)
CHLORIDE SERPL-SCNC: 108 MMOL/L (ref 99–109)
CHOLEST SERPL-MCNC: 176 MG/DL (ref 0–200)
CO2 SERPL-SCNC: 28 MMOL/L (ref 20–31)
CREAT SERPL-MCNC: 1.15 MG/DL (ref 0.6–1.3)
ERYTHROCYTE [DISTWIDTH] IN BLOOD BY AUTOMATED COUNT: 16.2 % (ref 11.3–14.5)
GLOBULIN SER CALC-MCNC: 2.4 GM/DL
GLUCOSE SERPL-MCNC: 86 MG/DL (ref 70–100)
HCT VFR BLD AUTO: 32.3 % (ref 38.9–50.9)
HDLC SERPL-MCNC: 56 MG/DL (ref 40–60)
HGB BLD-MCNC: 10 G/DL (ref 13.1–17.5)
LDLC SERPL CALC-MCNC: 107 MG/DL (ref 0–100)
MCH RBC QN AUTO: 24.6 PG (ref 27–31)
MCHC RBC AUTO-ENTMCNC: 31 G/DL (ref 32–36)
MCV RBC AUTO: 79.4 FL (ref 80–99)
PLATELET # BLD AUTO: 222 10*3/MM3 (ref 150–450)
POTASSIUM SERPL-SCNC: 4.2 MMOL/L (ref 3.5–5.5)
PROT SERPL-MCNC: 6.3 G/DL (ref 5.7–8.2)
RBC # BLD AUTO: 4.07 10*6/MM3 (ref 4.2–5.76)
SODIUM SERPL-SCNC: 141 MMOL/L (ref 132–146)
T4 FREE SERPL-MCNC: 0.89 NG/DL (ref 0.89–1.76)
TRIGL SERPL-MCNC: 64 MG/DL (ref 0–150)
TSH SERPL DL<=0.005 MIU/L-ACNC: 3.89 MIU/ML (ref 0.35–5.35)
VLDLC SERPL CALC-MCNC: 12.8 MG/DL
WBC # BLD AUTO: 4.77 10*3/MM3 (ref 3.5–10.8)

## 2019-03-20 PROCEDURE — 99214 OFFICE O/P EST MOD 30 MIN: CPT | Performed by: INTERNAL MEDICINE

## 2019-03-20 PROCEDURE — 36415 COLL VENOUS BLD VENIPUNCTURE: CPT | Performed by: INTERNAL MEDICINE

## 2019-03-20 RX ORDER — MINERAL OIL, PETROLATUM 425; 568 MG/G; MG/G
OINTMENT OPHTHALMIC
Qty: 7 G | Refills: 6 | Status: SHIPPED | OUTPATIENT
Start: 2019-03-20

## 2019-03-20 NOTE — PROGRESS NOTES
Subjective   Cooper Covarrubias is a 87 y.o. male.     History of Present Illness     1. COPD- chronic and controlled.  Patient has been doing very well in regards to his respiratory status along with inhalers.  The Advair has helped improve his COPD and he has had no recent exacerbations.  Patient denies any dyspnea on exertion, coughing, no wheezing, no fever, chills, no other systemic signs.    2 anxiety- chronic and doing well.  Patient also is emotionally doing very well and continued on the lorazepam as needed.  Patient denies any recent panic attacks, no emotional liability threats towards himself or anybody else, no suicidal ideations.    Review of Systems   All other systems reviewed and are negative.      Objective   Physical Exam   Constitutional: He appears well-developed and well-nourished.   HENT:   Head: Normocephalic.   Right Ear: External ear normal.   Left Ear: External ear normal.   Nose: Nose normal.   Mouth/Throat: Oropharynx is clear and moist.   Eyes: Conjunctivae and EOM are normal. Pupils are equal, round, and reactive to light.   Neck: Normal range of motion. Neck supple.   Cardiovascular: Normal rate, regular rhythm and normal heart sounds.   Pulmonary/Chest: Effort normal and breath sounds normal.   Nursing note and vitals reviewed.        Assessment/Plan   Cooper was seen today for anxiety.    Diagnoses and all orders for this visit:      Anxiety-continue with lorazepam    Chronic obstructive pulmonary disease, unspecified COPD type (CMS/HCC)  -     fluticasone-salmeterol (ADVAIR HFA) 45-21 MCG/ACT inhaler; Inhale 2 puffs 2 (Two) Times a Day.    Lipid screening    Abnormal finding of blood chemistry     Other orders  -     artificial tears (REFRESH LACRI-LUBE) ointment ophthalmic ointment; Administer  to both eyes Every 1 (One) Hour As Needed (for eye congestion). One drop each eye BID prn

## 2019-04-24 DIAGNOSIS — F41.9 ANXIETY: ICD-10-CM

## 2019-04-26 RX ORDER — LORAZEPAM 0.5 MG/1
TABLET ORAL
Qty: 60 TABLET | Refills: 3 | Status: SHIPPED | OUTPATIENT
Start: 2019-04-26 | End: 2019-08-26 | Stop reason: SDUPTHER

## 2019-06-19 ENCOUNTER — TELEPHONE (OUTPATIENT)
Dept: INTERNAL MEDICINE | Facility: CLINIC | Age: 84
End: 2019-06-19

## 2019-06-19 NOTE — TELEPHONE ENCOUNTER
----- Message from Deysi Lawson sent at 6/19/2019  2:05 PM EDT -----  I recd VM from pt on 6/19/19 at 11:09 am asking for a derm referral to Dr Patrick at [facility]. He stated he was seen yesterday and wants one to start 6/1/19-he isnt sure he needs one but wants one placed just in case.  Call back number is [phone]      I called that number 3 times and it sounded like someone answered but then hung up each time-we need to know what he needs to be seen for

## 2019-07-11 RX ORDER — IMIPRAMINE HYDROCHLORIDE 25 MG/1
TABLET ORAL
Qty: 1 EACH | Refills: 0 | Status: SHIPPED | OUTPATIENT
Start: 2019-07-11

## 2019-07-11 RX ORDER — OXYBUTYNIN CHLORIDE 10 MG/1
TABLET, EXTENDED RELEASE ORAL
Qty: 90 TABLET | Refills: 1 | Status: SHIPPED | OUTPATIENT
Start: 2019-07-11 | End: 2020-01-13

## 2019-07-11 RX ORDER — TAMSULOSIN HYDROCHLORIDE 0.4 MG/1
CAPSULE ORAL
Qty: 90 CAPSULE | Refills: 1 | Status: SHIPPED | OUTPATIENT
Start: 2019-07-11 | End: 2020-01-13

## 2019-08-26 DIAGNOSIS — F41.9 ANXIETY: ICD-10-CM

## 2019-08-27 RX ORDER — LORAZEPAM 0.5 MG/1
TABLET ORAL
Qty: 60 TABLET | Refills: 2 | Status: SHIPPED | OUTPATIENT
Start: 2019-08-27 | End: 2019-10-24 | Stop reason: SDUPTHER

## 2019-09-18 ENCOUNTER — OFFICE VISIT (OUTPATIENT)
Dept: INTERNAL MEDICINE | Facility: CLINIC | Age: 84
End: 2019-09-18

## 2019-09-18 VITALS
DIASTOLIC BLOOD PRESSURE: 78 MMHG | BODY MASS INDEX: 24.87 KG/M2 | SYSTOLIC BLOOD PRESSURE: 136 MMHG | TEMPERATURE: 98.4 F | HEART RATE: 67 BPM | OXYGEN SATURATION: 97 % | RESPIRATION RATE: 20 BRPM | WEIGHT: 188.5 LBS

## 2019-09-18 DIAGNOSIS — J42 CHRONIC BRONCHITIS, UNSPECIFIED CHRONIC BRONCHITIS TYPE (HCC): ICD-10-CM

## 2019-09-18 DIAGNOSIS — C44.310 BASAL CELL CARCINOMA (BCC) OF SKIN OF FACE, UNSPECIFIED PART OF FACE: Primary | ICD-10-CM

## 2019-09-18 DIAGNOSIS — IMO0002 SQUAMOUS CELL CARCINOMA: ICD-10-CM

## 2019-09-18 DIAGNOSIS — F41.9 ANXIETY: ICD-10-CM

## 2019-09-18 PROCEDURE — 99214 OFFICE O/P EST MOD 30 MIN: CPT | Performed by: INTERNAL MEDICINE

## 2019-09-18 RX ORDER — MINOCYCLINE HYDROCHLORIDE 100 MG/1
CAPSULE ORAL
COMMUNITY
Start: 2019-08-12 | End: 2021-02-03

## 2019-09-18 NOTE — PROGRESS NOTES
Subjective   Cooper Covarrubias is a 88 y.o. male.     History of Present Illness     The following portions of the patient's history were reviewed and updated as appropriate: allergies, current medications, past family history, past medical history, past social history, past surgical history and problem list.    1 anxiety-chronic and is doing well at this time.  Patient continues on his current medications and has not had any panic attacks, worrying, or worsening depression.    2 skin cancer- newly dx. Patient says that he has been diagnosed with one basal carcinoma and two squamous carcinoma patient will be doing a topical chemical treatment    3 COPD- chronic and doing well.  No shortness of breath, no chest tightness, no dyspnea on exertion      Review of Systems   All other systems reviewed and are negative.      Objective   Physical Exam   Constitutional: He is oriented to person, place, and time. He appears well-developed and well-nourished.   HENT:   Head: Normocephalic and atraumatic.   Eyes: Conjunctivae and EOM are normal. Pupils are equal, round, and reactive to light.   Neck: Normal range of motion. Neck supple.   Cardiovascular: Normal rate, regular rhythm and normal heart sounds.   Pulmonary/Chest: Effort normal and breath sounds normal.   Musculoskeletal: Normal range of motion.   Neurological: He is alert and oriented to person, place, and time.   Nursing note and vitals reviewed.        Assessment/Plan   Cooper was seen today for follow-up and anxiety.    Diagnoses and all orders for this visit:    Spent approximately 30 minutes of 30 minutes face-to-face with patient, greater than 50% of that time was used to discuss and manage the following listed:    Basal cell carcinoma (BCC) of skin of face, unspecified part of face-continue to follow-up with dermatology    Squamous cell carcinoma-continue to follow-up with dermatology    Anxiety- continue on current medications.    Chronic bronchitis, unspecified  chronic bronchitis type (CMS/HCC)-continue on current medications and inhaler therapy.

## 2019-10-02 DIAGNOSIS — F41.9 ANXIETY: ICD-10-CM

## 2019-10-23 ENCOUNTER — TELEPHONE (OUTPATIENT)
Dept: PEDIATRICS | Facility: OTHER | Age: 84
End: 2019-10-23

## 2019-10-23 DIAGNOSIS — F41.9 ANXIETY: ICD-10-CM

## 2019-10-23 RX ORDER — LORAZEPAM 0.5 MG/1
0.5 TABLET ORAL 2 TIMES DAILY
Qty: 60 TABLET | Refills: 2 | Status: CANCELLED | OUTPATIENT
Start: 2019-10-23

## 2019-10-23 NOTE — TELEPHONE ENCOUNTER
Patient is requesting his winter dosage of LORazepam. Patient states he takes three per day in the winter as opposed to his regular two per day.    Please advise.

## 2019-10-24 DIAGNOSIS — F41.9 ANXIETY: ICD-10-CM

## 2019-10-24 RX ORDER — LORAZEPAM 0.5 MG/1
TABLET ORAL
Qty: 90 TABLET | Refills: 2 | Status: SHIPPED | OUTPATIENT
Start: 2019-10-24 | End: 2020-01-24

## 2019-10-24 NOTE — TELEPHONE ENCOUNTER
Please tell patient that the Ativan medication has been called in and it reflects the 3 times a day dosing as requested.

## 2019-10-24 NOTE — TELEPHONE ENCOUNTER
Tried to notify patient several times and it sounds like he is picking up the phone but does not respond by his voice.

## 2019-11-01 ENCOUNTER — OFFICE VISIT (OUTPATIENT)
Dept: INTERNAL MEDICINE | Facility: CLINIC | Age: 84
End: 2019-11-01

## 2019-11-01 VITALS
BODY MASS INDEX: 24.54 KG/M2 | OXYGEN SATURATION: 94 % | SYSTOLIC BLOOD PRESSURE: 162 MMHG | HEART RATE: 82 BPM | WEIGHT: 186 LBS | DIASTOLIC BLOOD PRESSURE: 80 MMHG | RESPIRATION RATE: 16 BRPM | TEMPERATURE: 98.4 F

## 2019-11-01 DIAGNOSIS — R03.0 ELEVATED BLOOD PRESSURE READING WITHOUT DIAGNOSIS OF HYPERTENSION: ICD-10-CM

## 2019-11-01 DIAGNOSIS — Z12.5 PROSTATE CANCER SCREENING: ICD-10-CM

## 2019-11-01 DIAGNOSIS — R61 NIGHT SWEATS: ICD-10-CM

## 2019-11-01 DIAGNOSIS — D64.9 ANEMIA, UNSPECIFIED TYPE: Primary | ICD-10-CM

## 2019-11-01 LAB
BILIRUB BLD-MCNC: NEGATIVE MG/DL
CLARITY, POC: CLEAR
COLOR UR: YELLOW
EXPIRATION DATE: NORMAL
GLUCOSE UR STRIP-MCNC: NEGATIVE MG/DL
KETONES UR QL: NEGATIVE
LEUKOCYTE EST, POC: NEGATIVE
Lab: NORMAL
NITRITE UR-MCNC: NEGATIVE MG/ML
PH UR: 7 [PH] (ref 5–8)
PROT UR STRIP-MCNC: NEGATIVE MG/DL
RBC # UR STRIP: NEGATIVE /UL
SP GR UR: 1.01 (ref 1–1.03)
UROBILINOGEN UR QL: NORMAL

## 2019-11-01 PROCEDURE — 36415 COLL VENOUS BLD VENIPUNCTURE: CPT | Performed by: PHYSICIAN ASSISTANT

## 2019-11-01 PROCEDURE — 81003 URINALYSIS AUTO W/O SCOPE: CPT | Performed by: PHYSICIAN ASSISTANT

## 2019-11-01 PROCEDURE — 99213 OFFICE O/P EST LOW 20 MIN: CPT | Performed by: PHYSICIAN ASSISTANT

## 2019-11-01 NOTE — PROGRESS NOTES
Chief Complaint   Patient presents with   • Hypertension     x4 months, elevated, cold sweats at bedtime       Subjective        History of Present Illness     Cooper Covarrubias is a 88 y.o. male. He presents with 4 month history of night sweats/ heat intolerance, and issues with blood pressure. Pt reports night sweats most nights of the week x4 months, worsening in the last 2 months. He does keep his house warm, but states he has not changed anything from his normal routine. He denies fever, chills, weight loss, cough, abdominal pain, N/V/D, vision change, fatigue or weakness. No changes in urination or BMs, no dark urine, blood in urine or dark stools or blood in stool. He does have SOA and wheezing at baseline with obstructive lung disease which has not recently worsened. He uses Incruse daily and has a rescue inhaler which improves his COPD symptoms.      He also reports elevated blood pressure x2 readings in the last week. His blood pressure was 180/86 two days in a row in the AM when checked at home. This morning it was 142/80. He has not had a dx of HTN in the past, and has never been on blood pressure medication in the past. He denies chest pain, HA, vision changes. He does have SOA as noted above.     The following portions of the patient's history were reviewed and updated as appropriate: allergies, current medications, past medical history, past social history and problem list.    Allergies   Allergen Reactions   • Latex Hives     Social History     Tobacco Use   • Smoking status: Former Smoker     Packs/day: 2.00     Years: 30.00     Pack years: 60.00   • Smokeless tobacco: Never Used   Substance Use Topics   • Alcohol use: No         Current Outpatient Medications:   •  albuterol (PROAIR HFA) 108 (90 BASE) MCG/ACT inhaler, ProAir  (90 Base) MCG/ACT Inhalation Aerosol Solution; Patient Sig: ProAir  (90 Base) MCG/ACT Inhalation Aerosol Solution INHALE ONE TO TWO PUFFS BY MOUTH EVERY 6 HOURS AS  NEEDED; 8.5; 1; 06-Aug-2014; Active, Disp: , Rfl:   •  artificial tears (REFRESH LACRI-LUBE) ointment ophthalmic ointment, Administer  to both eyes Every 1 (One) Hour As Needed (for eye congestion). One drop each eye BID prn, Disp: 7 g, Rfl: 6  •  fluorometholone (FML) 0.1 % ophthalmic suspension, , Disp: , Rfl:   •  fluticasone (FLONASE) 50 MCG/ACT nasal spray, SPRAY ONE TO TWO SPRAYS IN EACH NOSTRIL ONCE DAILY, Disp: 1 bottle, Rfl: 10  •  fluticasone-salmeterol (ADVAIR HFA) 45-21 MCG/ACT inhaler, Inhale 2 puffs 2 (Two) Times a Day., Disp: 1 inhaler, Rfl: 6  •  LORazepam (ATIVAN) 0.5 MG tablet, Take 1 tablet by mouth 3 times a day as needed, Disp: 90 tablet, Rfl: 2  •  meloxicam (MOBIC) 15 MG tablet, TAKE ONE TABLET BY MOUTH DAILY, Disp: 90 tablet, Rfl: 1  •  minocycline (MINOCIN,DYNACIN) 100 MG capsule, , Disp: , Rfl:   •  oxybutynin XL (DITROPAN-XL) 10 MG 24 hr tablet, TAKE ONE TABLET BY MOUTH DAILY, Disp: 90 tablet, Rfl: 1  •  sertraline (ZOLOFT) 50 MG tablet, TAKE ONE TABLET BY MOUTH DAILY, Disp: 90 tablet, Rfl: 2  •  Spacer/Aero-Holding Chambers (AEROCHAMBER PLUS RUSTY-VU) misc, USE AS DIRECTED PER DOCTOR'S INSTRUCTIONS, Disp: 1 each, Rfl: 0  •  tamsulosin (FLOMAX) 0.4 MG capsule 24 hr capsule, TAKE ONE CAPSULE BY MOUTH DAILY, Disp: 90 capsule, Rfl: 1  •  Umeclidinium Bromide (INCRUSE ELLIPTA) 62.5 MCG/INH aerosol powder , Inhale 1 puff daily., Disp: 4 each, Rfl: 3  •  tobramycin-dexamethasone (TOBRADEX) 0.3-0.1 % ophthalmic suspension, TobraDex 0.3 %-0.1 % eye drops,suspension  INSTILL 1 DROP INTO AFFECTED EYE(S) BY OPHTHALMIC ROUTE tid os, Disp: , Rfl:     Current Facility-Administered Medications:   •  levalbuterol (XOPENEX) nebulizer solution 0.63 mg, 0.63 mg, Nebulization, Q6H PRN, Teresita Montes PA    Review of Systems   Constitutional: Negative for appetite change, chills, fatigue, fever and unexpected weight loss.        +night sweats   HENT: Negative for ear pain, sore throat and trouble swallowing.     Eyes: Negative for pain and visual disturbance.   Respiratory: Positive for shortness of breath (chronic, at baseline) and wheezing. Negative for cough.    Cardiovascular: Negative for chest pain and leg swelling.   Gastrointestinal: Negative for abdominal pain, blood in stool, diarrhea, nausea and vomiting.   Endocrine: Positive for heat intolerance.   Genitourinary: Negative for dysuria and hematuria.   Musculoskeletal: Negative for myalgias and neck pain.   Neurological: Negative for dizziness, weakness and headache.       Objective   Vitals:    11/01/19 1539   BP: 162/80   Pulse: 82   Resp: 16   Temp: 98.4 °F (36.9 °C)   SpO2: 94%     Physical Exam   Constitutional: He appears well-developed and well-nourished.   HENT:   Head: Normocephalic and atraumatic.   Right Ear: Tympanic membrane, external ear and ear canal normal.   Left Ear: Tympanic membrane, external ear and ear canal normal.   Mouth/Throat: Oropharynx is clear and moist and mucous membranes are normal.   Eyes: Conjunctivae are normal.   Neck: Normal range of motion. Neck supple. Carotid bruit is not present. No thyromegaly present.   Cardiovascular: Normal rate, regular rhythm and intact distal pulses.   No murmur heard.  Pulmonary/Chest: Effort normal and breath sounds normal. He has no wheezes. He has no rales.   Abdominal: Soft. Bowel sounds are normal. He exhibits no mass. There is no hepatosplenomegaly. There is no tenderness. There is no CVA tenderness.   Lymphadenopathy:     He has no cervical adenopathy.   Skin: No rash noted.   Psychiatric: He has a normal mood and affect. His behavior is normal.             Assessment/Plan   Cooper was seen today for hypertension.    Diagnoses and all orders for this visit:    Anemia, unspecified type  -     Basic Metabolic Panel  -     CBC & Differential    Night sweats  -     Basic Metabolic Panel  -     CBC & Differential  -     TSH  -     POC Urinalysis Dipstick, Automated  -     PSA Screen  -      Lipase  -     XR Chest PA & Lateral; Future    Elevated blood pressure reading without diagnosis of hypertension  -     Basic Metabolic Panel  -     CBC & Differential  -     TSH    Prostate cancer screening  -     PSA Screen      I would like pt to monitor his BP at home daily, and bring log to next visit for review. If his BP remains elevated, will initiate medication.   Further plans after review of labs and CXR.           Return in about 2 weeks (around 11/15/2019) for Follow up- with Joanna

## 2019-11-02 LAB
BASOPHILS # BLD AUTO: 0.04 10*3/MM3 (ref 0–0.2)
BASOPHILS NFR BLD AUTO: 0.7 % (ref 0–1.5)
BUN SERPL-MCNC: 13 MG/DL (ref 8–23)
BUN/CREAT SERPL: 12 (ref 7–25)
CALCIUM SERPL-MCNC: 8.8 MG/DL (ref 8.6–10.5)
CHLORIDE SERPL-SCNC: 103 MMOL/L (ref 98–107)
CO2 SERPL-SCNC: 25.4 MMOL/L (ref 22–29)
CREAT SERPL-MCNC: 1.08 MG/DL (ref 0.76–1.27)
EOSINOPHIL # BLD AUTO: 0.27 10*3/MM3 (ref 0–0.4)
EOSINOPHIL NFR BLD AUTO: 4.9 % (ref 0.3–6.2)
ERYTHROCYTE [DISTWIDTH] IN BLOOD BY AUTOMATED COUNT: 19.9 % (ref 12.3–15.4)
GLUCOSE SERPL-MCNC: 82 MG/DL (ref 65–99)
HCT VFR BLD AUTO: 35 % (ref 37.5–51)
HGB BLD-MCNC: 11 G/DL (ref 13–17.7)
IMM GRANULOCYTES # BLD AUTO: 0.01 10*3/MM3 (ref 0–0.05)
IMM GRANULOCYTES NFR BLD AUTO: 0.2 % (ref 0–0.5)
LIPASE SERPL-CCNC: 23 U/L (ref 13–60)
LYMPHOCYTES # BLD AUTO: 0.9 10*3/MM3 (ref 0.7–3.1)
LYMPHOCYTES NFR BLD AUTO: 16.3 % (ref 19.6–45.3)
MCH RBC QN AUTO: 24.4 PG (ref 26.6–33)
MCHC RBC AUTO-ENTMCNC: 31.4 G/DL (ref 31.5–35.7)
MCV RBC AUTO: 77.6 FL (ref 79–97)
MONOCYTES # BLD AUTO: 0.66 10*3/MM3 (ref 0.1–0.9)
MONOCYTES NFR BLD AUTO: 12 % (ref 5–12)
NEUTROPHILS # BLD AUTO: 3.63 10*3/MM3 (ref 1.7–7)
NEUTROPHILS NFR BLD AUTO: 65.9 % (ref 42.7–76)
NRBC BLD AUTO-RTO: 0 /100 WBC (ref 0–0.2)
PLATELET # BLD AUTO: 223 10*3/MM3 (ref 140–450)
POTASSIUM SERPL-SCNC: 4.4 MMOL/L (ref 3.5–5.2)
PSA SERPL-MCNC: 3.35 NG/ML (ref 0–4)
RBC # BLD AUTO: 4.51 10*6/MM3 (ref 4.14–5.8)
SODIUM SERPL-SCNC: 141 MMOL/L (ref 136–145)
TSH SERPL DL<=0.005 MIU/L-ACNC: 3.4 UIU/ML (ref 0.27–4.2)
WBC # BLD AUTO: 5.51 10*3/MM3 (ref 3.4–10.8)

## 2019-11-08 ENCOUNTER — TELEPHONE (OUTPATIENT)
Dept: INTERNAL MEDICINE | Facility: CLINIC | Age: 84
End: 2019-11-08

## 2019-11-08 NOTE — TELEPHONE ENCOUNTER
Patient stated he has yet to hear the results of his 11/01/19 labs. Patient stated he would like a call from Cyndi Bajwa or her MA to discuss. Patient stated he should be available all day.    Please advise.

## 2019-11-08 NOTE — TELEPHONE ENCOUNTER
Cooper Covarrubias [phone]  Returned pt's call, advised of clinical message. See result note. Closing call.

## 2019-11-14 RX ORDER — TOBRAMYCIN AND DEXAMETHASONE 3; 1 MG/ML; MG/ML
SUSPENSION/ DROPS OPHTHALMIC
COMMUNITY
End: 2021-01-01

## 2019-11-19 ENCOUNTER — OFFICE VISIT (OUTPATIENT)
Dept: INTERNAL MEDICINE | Facility: CLINIC | Age: 84
End: 2019-11-19

## 2019-11-19 ENCOUNTER — HOSPITAL ENCOUNTER (OUTPATIENT)
Dept: GENERAL RADIOLOGY | Facility: HOSPITAL | Age: 84
Discharge: HOME | End: 2019-11-19

## 2019-11-19 VITALS
WEIGHT: 190 LBS | TEMPERATURE: 98.2 F | RESPIRATION RATE: 16 BRPM | SYSTOLIC BLOOD PRESSURE: 144 MMHG | BODY MASS INDEX: 25.07 KG/M2 | HEART RATE: 84 BPM | OXYGEN SATURATION: 97 % | DIASTOLIC BLOOD PRESSURE: 70 MMHG

## 2019-11-19 DIAGNOSIS — R61 NIGHT SWEATS: ICD-10-CM

## 2019-11-19 DIAGNOSIS — F41.9 ANXIETY: Primary | ICD-10-CM

## 2019-11-19 PROCEDURE — 99213 OFFICE O/P EST LOW 20 MIN: CPT | Performed by: PHYSICIAN ASSISTANT

## 2019-12-17 RX ORDER — MELOXICAM 15 MG/1
TABLET ORAL
Qty: 30 TABLET | Refills: 0 | Status: SHIPPED | OUTPATIENT
Start: 2019-12-17 | End: 2020-01-06

## 2019-12-20 NOTE — TELEPHONE ENCOUNTER
----- Message from Deysi Lawson sent at 11/7/2017  3:28 PM EST -----  [phone]    NEEDS REFILL OF LORAZEPAM[facility] S[facility] A RE[facility]    UNDERSTANDS THIS MESSAGE WILL NOT BE ADDRESSED UNTIL WEDNESDAY    GIN[facility] MACIAS   [No change from previous assessment] : No change from previous assessment [Patient prepared for dive] : Patient prepared for dive [Patient undergoing HBO treatment for __________] : Patient undergoing HBO treatment for [unfilled] [No dizziness or thirst] :  No dizziness or thirst [Patient descended without problem for 9 minutes] : Patient descended without problem for 9 minutes [No ear problems] : No ear problems [Vital signs stable] : Vital signs stable [No Chest Pain, shortness of breath] : No Chest Pain, shortness of breath [Tolerating dive well] : Tolerating dive well [Respiratory Rate Stable] : Respiratory Rate Stable [Vital Signs stable] : Vital Signs stable [Tolerated Ascent well] : Tolerated Ascent well [No chest pain, shortness of breath, or ear pain] :  No chest pain, shortness of breath, or ear pain  [A physician was present throughout the entire HBOT] : A physician was present throughout the entire HBOT [No] : No [Clinically Stable] : Clinically stable [0] : 0 out of 10 [FreeTextEntry2] : None

## 2019-12-30 DIAGNOSIS — J44.9 CHRONIC OBSTRUCTIVE PULMONARY DISEASE, UNSPECIFIED COPD TYPE (HCC): ICD-10-CM

## 2020-01-06 RX ORDER — MELOXICAM 15 MG/1
TABLET ORAL
Qty: 30 TABLET | Refills: 2 | Status: SHIPPED | OUTPATIENT
Start: 2020-01-06 | End: 2021-01-01

## 2020-01-13 RX ORDER — TAMSULOSIN HYDROCHLORIDE 0.4 MG/1
CAPSULE ORAL
Qty: 90 CAPSULE | Refills: 0 | Status: SHIPPED | OUTPATIENT
Start: 2020-01-13 | End: 2020-04-07

## 2020-01-13 RX ORDER — OXYBUTYNIN CHLORIDE 10 MG/1
TABLET, EXTENDED RELEASE ORAL
Qty: 90 TABLET | Refills: 0 | Status: SHIPPED | OUTPATIENT
Start: 2020-01-13 | End: 2020-04-16

## 2020-01-23 DIAGNOSIS — F41.9 ANXIETY: ICD-10-CM

## 2020-01-23 DIAGNOSIS — Z79.899 HIGH RISK MEDICATION USE: Primary | ICD-10-CM

## 2020-01-24 DIAGNOSIS — F41.9 ANXIETY: ICD-10-CM

## 2020-01-24 RX ORDER — LORAZEPAM 0.5 MG/1
TABLET ORAL
Qty: 90 TABLET | Refills: 0 | Status: SHIPPED | OUTPATIENT
Start: 2020-01-24 | End: 2020-02-26

## 2020-01-24 RX ORDER — LORAZEPAM 0.5 MG/1
TABLET ORAL
Qty: 90 TABLET | Refills: 0 | Status: SHIPPED | OUTPATIENT
Start: 2020-01-24 | End: 2020-01-24 | Stop reason: SDUPTHER

## 2020-01-24 NOTE — TELEPHONE ENCOUNTER
Tried to call patient no answer and no VM available. Patient needs to come into office and update CSA.

## 2020-02-24 DIAGNOSIS — F41.9 ANXIETY: ICD-10-CM

## 2020-02-25 NOTE — TELEPHONE ENCOUNTER
Last appointment:  11/19/2019  Next appointment: 3/11/2020  Ina: 1/31/2018  UDS: 02/02/2018  CSA: 02/02/2018    Last Refill: 01/24/2020  Quantity: 90  Refills:  0

## 2020-02-26 RX ORDER — LORAZEPAM 0.5 MG/1
TABLET ORAL
Qty: 90 TABLET | Refills: 2 | Status: SHIPPED | OUTPATIENT
Start: 2020-02-26 | End: 2020-05-28

## 2020-04-07 RX ORDER — TAMSULOSIN HYDROCHLORIDE 0.4 MG/1
CAPSULE ORAL
Qty: 90 CAPSULE | Refills: 0 | Status: SHIPPED | OUTPATIENT
Start: 2020-04-07 | End: 2020-07-09

## 2020-04-16 RX ORDER — OXYBUTYNIN CHLORIDE 10 MG/1
TABLET, EXTENDED RELEASE ORAL
Qty: 90 TABLET | Refills: 1 | Status: SHIPPED | OUTPATIENT
Start: 2020-04-16 | End: 2020-10-06

## 2020-05-27 DIAGNOSIS — F41.9 ANXIETY: ICD-10-CM

## 2020-05-28 RX ORDER — LORAZEPAM 0.5 MG/1
TABLET ORAL
Qty: 90 TABLET | Refills: 1 | Status: SHIPPED | OUTPATIENT
Start: 2020-05-28 | End: 2020-07-23

## 2020-06-09 ENCOUNTER — TELEPHONE (OUTPATIENT)
Dept: INTERNAL MEDICINE | Facility: CLINIC | Age: 85
End: 2020-06-09

## 2020-06-09 NOTE — TELEPHONE ENCOUNTER
Patient requested a call back. Patient stated he has been having trouble controlling his urine. Patient has been wetting himself frequently for the last week. Patient has been taking his oxybutynin XL (DITROPAN-XL) 10 MG 24 hr tablet as prescribed but it has not been helping.    Please call and advise. Patient call back [phone]

## 2020-06-09 NOTE — TELEPHONE ENCOUNTER
Cooper Covarrubias [phone]  Spoke to pt, advised of clinical message. Pt states he has COPD and doesn't want to come in the office due to COVID. Offered a VV, pt states he can only do a telephone visit, please advise?

## 2020-06-11 ENCOUNTER — OFFICE VISIT (OUTPATIENT)
Dept: INTERNAL MEDICINE | Facility: CLINIC | Age: 85
End: 2020-06-11

## 2020-06-11 DIAGNOSIS — F33.42 RECURRENT MAJOR DEPRESSIVE DISORDER, IN FULL REMISSION (HCC): ICD-10-CM

## 2020-06-11 DIAGNOSIS — R32 URINARY INCONTINENCE, UNSPECIFIED TYPE: Primary | ICD-10-CM

## 2020-06-11 DIAGNOSIS — R39.15 URINARY URGENCY: ICD-10-CM

## 2020-06-11 DIAGNOSIS — J42 CHRONIC BRONCHITIS, UNSPECIFIED CHRONIC BRONCHITIS TYPE (HCC): ICD-10-CM

## 2020-06-11 PROCEDURE — 99442 PR PHYS/QHP TELEPHONE EVALUATION 11-20 MIN: CPT | Performed by: INTERNAL MEDICINE

## 2020-06-11 RX ORDER — NITROFURANTOIN 25; 75 MG/1; MG/1
100 CAPSULE ORAL 2 TIMES DAILY
Qty: 14 CAPSULE | Refills: 0 | Status: SHIPPED | OUTPATIENT
Start: 2020-06-11 | End: 2021-02-03

## 2020-06-11 NOTE — PROGRESS NOTES
Subjective   Cooper Covarrubias is a 88 y.o. male.     History of Present Illness     The following portions of the patient's history were reviewed and updated as appropriate: allergies, current medications, past family history, past medical history, past social history, past surgical history and problem list.    You have chosen to receive care through a telephone visit. Do you consent to use a telephone visit for your medical care today? Yes    Urinary urgency and frequency  Duration 1 to 2 weeks  Symptoms: Patient says that he has been having urinary urgency, frequency over the past 1 to 2 weeks no dysuria, no fever, chills, nausea, vomiting or diarrhea, no other symptoms.  He continues with oxybutynin which was initially helping but he says that it may not be helping now.    Review of Systems   All other systems reviewed and are negative.      Objective   Physical Exam   Constitutional: He is oriented to person, place, and time. He appears well-developed and well-nourished.   Neurological: He is alert and oriented to person, place, and time.   Psychiatric: He has a normal mood and affect. His behavior is normal.   Vitals reviewed.        Assessment/Plan   Diagnoses and all orders for this visit:    Spent 15 minutes on telephone visit99    Urinary incontinence, unspecified type    Chronic bronchitis, unspecified chronic bronchitis type (CMS/HCC)-continue with appropriate inhalers    Recurrent major depressive disorder, in full remission (CMS/HCC)-continue on current medication for anxiety/depression    Urinary urgency  -     nitrofurantoin, macrocrystal-monohydrate, (Macrobid) 100 MG capsule; Take 1 capsule by mouth 2 (Two) Times a Day.

## 2020-07-09 DIAGNOSIS — F41.9 ANXIETY: ICD-10-CM

## 2020-07-09 RX ORDER — TAMSULOSIN HYDROCHLORIDE 0.4 MG/1
CAPSULE ORAL
Qty: 90 CAPSULE | Refills: 0 | Status: SHIPPED | OUTPATIENT
Start: 2020-07-09 | End: 2020-10-06

## 2020-07-23 DIAGNOSIS — F41.9 ANXIETY: ICD-10-CM

## 2020-07-23 RX ORDER — LORAZEPAM 0.5 MG/1
TABLET ORAL
Qty: 90 TABLET | Refills: 3 | Status: SHIPPED | OUTPATIENT
Start: 2020-07-23 | End: 2020-11-23

## 2020-10-06 RX ORDER — TAMSULOSIN HYDROCHLORIDE 0.4 MG/1
CAPSULE ORAL
Qty: 90 CAPSULE | Refills: 3 | Status: SHIPPED | OUTPATIENT
Start: 2020-10-06 | End: 2021-01-01

## 2020-10-06 RX ORDER — OXYBUTYNIN CHLORIDE 10 MG/1
TABLET, EXTENDED RELEASE ORAL
Qty: 90 TABLET | Refills: 3 | Status: SHIPPED | OUTPATIENT
Start: 2020-10-06 | End: 2021-06-28

## 2020-11-22 DIAGNOSIS — F41.9 ANXIETY: ICD-10-CM

## 2020-11-23 RX ORDER — LORAZEPAM 0.5 MG/1
TABLET ORAL
Qty: 90 TABLET | Refills: 2 | Status: SHIPPED | OUTPATIENT
Start: 2020-11-23 | End: 2021-02-25

## 2021-01-01 ENCOUNTER — TELEPHONE (OUTPATIENT)
Dept: INTERNAL MEDICINE | Facility: CLINIC | Age: 86
End: 2021-01-01

## 2021-01-01 ENCOUNTER — TELEMEDICINE (OUTPATIENT)
Dept: INTERNAL MEDICINE | Facility: CLINIC | Age: 86
End: 2021-01-01

## 2021-01-01 ENCOUNTER — OFFICE VISIT (OUTPATIENT)
Dept: INTERNAL MEDICINE | Facility: CLINIC | Age: 86
End: 2021-01-01

## 2021-01-01 VITALS
OXYGEN SATURATION: 97 % | RESPIRATION RATE: 20 BRPM | BODY MASS INDEX: 25.63 KG/M2 | DIASTOLIC BLOOD PRESSURE: 90 MMHG | WEIGHT: 189 LBS | SYSTOLIC BLOOD PRESSURE: 152 MMHG | TEMPERATURE: 98.7 F | HEART RATE: 71 BPM

## 2021-01-01 VITALS
TEMPERATURE: 98.2 F | DIASTOLIC BLOOD PRESSURE: 70 MMHG | HEIGHT: 72 IN | WEIGHT: 193.25 LBS | RESPIRATION RATE: 20 BRPM | HEART RATE: 74 BPM | SYSTOLIC BLOOD PRESSURE: 160 MMHG | BODY MASS INDEX: 26.18 KG/M2 | OXYGEN SATURATION: 97 %

## 2021-01-01 VITALS
TEMPERATURE: 98.6 F | RESPIRATION RATE: 16 BRPM | DIASTOLIC BLOOD PRESSURE: 80 MMHG | HEART RATE: 63 BPM | OXYGEN SATURATION: 98 % | SYSTOLIC BLOOD PRESSURE: 160 MMHG | WEIGHT: 188.8 LBS | BODY MASS INDEX: 25.61 KG/M2

## 2021-01-01 DIAGNOSIS — F33.42 RECURRENT MAJOR DEPRESSIVE DISORDER, IN FULL REMISSION (HCC): Primary | ICD-10-CM

## 2021-01-01 DIAGNOSIS — F41.9 ANXIETY: ICD-10-CM

## 2021-01-01 DIAGNOSIS — N13.8 BPH WITH URINARY OBSTRUCTION: Primary | ICD-10-CM

## 2021-01-01 DIAGNOSIS — N39.46 MIXED STRESS AND URGE URINARY INCONTINENCE: ICD-10-CM

## 2021-01-01 DIAGNOSIS — N13.8 BPH WITH URINARY OBSTRUCTION: ICD-10-CM

## 2021-01-01 DIAGNOSIS — N40.1 BPH WITH URINARY OBSTRUCTION: ICD-10-CM

## 2021-01-01 DIAGNOSIS — F41.9 ANXIETY: Primary | ICD-10-CM

## 2021-01-01 DIAGNOSIS — F33.0 MILD EPISODE OF RECURRENT MAJOR DEPRESSIVE DISORDER (HCC): ICD-10-CM

## 2021-01-01 DIAGNOSIS — R39.15 URINARY URGENCY: ICD-10-CM

## 2021-01-01 DIAGNOSIS — N40.1 BPH WITH URINARY OBSTRUCTION: Primary | ICD-10-CM

## 2021-01-01 DIAGNOSIS — C44.311 BASAL CELL CARCINOMA (BCC) OF SKIN OF NOSE: ICD-10-CM

## 2021-01-01 DIAGNOSIS — I10 PRIMARY HYPERTENSION: ICD-10-CM

## 2021-01-01 DIAGNOSIS — F33.42 RECURRENT MAJOR DEPRESSIVE DISORDER, IN FULL REMISSION (HCC): ICD-10-CM

## 2021-01-01 PROCEDURE — 99214 OFFICE O/P EST MOD 30 MIN: CPT | Performed by: INTERNAL MEDICINE

## 2021-01-01 PROCEDURE — 99441 PR PHYS/QHP TELEPHONE EVALUATION 5-10 MIN: CPT | Performed by: INTERNAL MEDICINE

## 2021-01-01 PROCEDURE — 99213 OFFICE O/P EST LOW 20 MIN: CPT | Performed by: INTERNAL MEDICINE

## 2021-01-01 RX ORDER — TAMSULOSIN HYDROCHLORIDE 0.4 MG/1
1 CAPSULE ORAL DAILY
Qty: 90 CAPSULE | Refills: 3 | Status: SHIPPED | OUTPATIENT
Start: 2021-01-01 | End: 2021-01-01 | Stop reason: SDUPTHER

## 2021-01-01 RX ORDER — AMLODIPINE BESYLATE 2.5 MG/1
2.5 TABLET ORAL DAILY
Qty: 90 TABLET | Refills: 3 | Status: SHIPPED | OUTPATIENT
Start: 2021-01-01 | End: 2022-01-01 | Stop reason: HOSPADM

## 2021-01-01 RX ORDER — SERTRALINE HYDROCHLORIDE 100 MG/1
100 TABLET, FILM COATED ORAL DAILY
Qty: 90 TABLET | Refills: 2 | Status: SHIPPED | OUTPATIENT
Start: 2021-01-01 | End: 2021-01-01

## 2021-01-01 RX ORDER — OXYBUTYNIN CHLORIDE 15 MG/1
TABLET, EXTENDED RELEASE ORAL
Qty: 30 TABLET | Refills: 3 | Status: SHIPPED | OUTPATIENT
Start: 2021-01-01 | End: 2021-01-01

## 2021-01-01 RX ORDER — LORAZEPAM 1 MG/1
TABLET ORAL
Qty: 90 TABLET | Refills: 0 | Status: SHIPPED | OUTPATIENT
Start: 2021-01-01 | End: 2021-01-01 | Stop reason: SDUPTHER

## 2021-01-01 RX ORDER — LORAZEPAM 1 MG/1
TABLET ORAL
Qty: 90 TABLET | Refills: 2 | Status: SHIPPED | OUTPATIENT
Start: 2021-01-01 | End: 2021-01-01

## 2021-01-01 RX ORDER — TAMSULOSIN HYDROCHLORIDE 0.4 MG/1
CAPSULE ORAL
Qty: 90 CAPSULE | Refills: 0 | Status: SHIPPED | OUTPATIENT
Start: 2021-01-01 | End: 2021-01-01 | Stop reason: SDUPTHER

## 2021-01-01 RX ORDER — LORAZEPAM 1 MG/1
TABLET ORAL
Qty: 90 TABLET | Refills: 2 | Status: SHIPPED | OUTPATIENT
Start: 2021-01-01 | End: 2021-01-01 | Stop reason: SDUPTHER

## 2021-01-01 RX ORDER — TAMSULOSIN HYDROCHLORIDE 0.4 MG/1
1 CAPSULE ORAL DAILY
Qty: 90 CAPSULE | Refills: 3 | Status: SHIPPED | OUTPATIENT
Start: 2021-01-01 | End: 2021-01-01

## 2021-01-01 RX ORDER — LORAZEPAM 0.5 MG/1
0.5 TABLET ORAL EVERY 8 HOURS PRN
Qty: 90 TABLET | Refills: 1 | Status: SHIPPED | OUTPATIENT
Start: 2021-01-01 | End: 2022-01-01

## 2021-01-06 DIAGNOSIS — F41.9 ANXIETY: ICD-10-CM

## 2021-01-06 NOTE — TELEPHONE ENCOUNTER
Caller: Cooper Covarrubias    Relationship: Self    Best call back number: [phone]    Medication needed:   Requested Prescriptions     Pending Prescriptions Disp Refills   • sertraline (ZOLOFT) 50 MG tablet [Pharmacy Med Name: SERTRALINE HCL 50 MG TABLET] 90 tablet 0     Sig: TAKE ONE TABLET BY MOUTH DAILY       When do you need the refill by: ASAP    What details did the patient provide when requesting the medication: OUT OF MEDICATION, WOULD APPRECIATE REFILL TODAY    Does the patient have less than a 3 day supply:  [x] Yes  [] No    What is the patient's preferred pharmacy: [facility] [address] [place] [address] [phone] [facility] [phone]

## 2021-01-20 ENCOUNTER — TELEPHONE (OUTPATIENT)
Dept: INTERNAL MEDICINE | Facility: CLINIC | Age: 86
End: 2021-01-20

## 2021-01-20 NOTE — TELEPHONE ENCOUNTER
Spoke to patient he stated that he has tried flonase. But nothing else. Offered him an appointment but he stated he has COPD and does not feel comfortable with coming in.

## 2021-01-20 NOTE — TELEPHONE ENCOUNTER
PT CALLED STATING HE IS EXPERIENCING CONGESTION     PT IS REQUESTING SOMETHING BE CALLED IN FOR A SINUS INFECTION    PLEASE ADVISE AT:  [phone]     [facility] [address] [place] [address] [phone] [facility] [phone] FX

## 2021-01-21 ENCOUNTER — TELEPHONE (OUTPATIENT)
Dept: INTERNAL MEDICINE | Facility: CLINIC | Age: 86
End: 2021-01-21

## 2021-01-21 NOTE — TELEPHONE ENCOUNTER
Caller: Cooper Covarrubias    Relationship: Self    Best call back number: [phone]  What medication are you requesting: ANTIBIOTIC     What are your current symptoms: NASAL CONGESTION    How long have you been experiencing symptoms: 1 WEEK    Have you had these symptoms before:    [x] Yes  [] No    Have you been treated for these symptoms before:   [x] Yes  [] No    If a prescription is needed, what is your preferred pharmacy and phone number:      [facility] [address] [place] [address] [phone] [facility] [phone]         Additional notes:

## 2021-01-22 RX ORDER — AZITHROMYCIN 250 MG/1
TABLET, FILM COATED ORAL
Qty: 6 TABLET | Refills: 0 | OUTPATIENT
Start: 2021-01-22 | End: 2021-05-20

## 2021-01-22 NOTE — TELEPHONE ENCOUNTER
PT IS CALLING TO CHECK ON THE STATUS OF MED REQUEST.    PT STATES THAT HE IS REALLY CONGESTED AND NEEDS THE MEDICATION        PLEASE ADVISE  [phone]

## 2021-01-26 ENCOUNTER — TELEPHONE (OUTPATIENT)
Dept: INTERNAL MEDICINE | Facility: CLINIC | Age: 86
End: 2021-01-26

## 2021-01-26 NOTE — TELEPHONE ENCOUNTER
PATIENT STATES THAT DOCTOR LUCIANA CALLED IN A Z PAC FOR HIM BECAUSE OF SINUS ISSUES. THE PATIENT STATES THAT HE HAS COPD AND HE IS STILL HAVING NASAL CONGESTION AND BLOOD WHEN HE BLOWS HIS NOSE.PATIENT STATES THAT HE IS HAVING DIFFICULTY BREATHING THROUGH HIS NOSE AND DRY MOUTH. PATIENT WOULD LIKE TO KNOW IF DOCTOR LUCIANA CAN CALL IN MORE MEDICATION. PLEASE CALL PATIENT TO DISCUSS    CALL [phone]

## 2021-01-27 RX ORDER — AMOXICILLIN AND CLAVULANATE POTASSIUM 500; 125 MG/1; MG/1
1 TABLET, FILM COATED ORAL 2 TIMES DAILY
Qty: 16 TABLET | Refills: 0 | Status: SHIPPED | OUTPATIENT
Start: 2021-01-27 | End: 2021-01-01

## 2021-01-27 NOTE — TELEPHONE ENCOUNTER
Attempted twice to contact pt, phone answers then hangs up.     HUB please inform pt of  message below and document.

## 2021-01-27 NOTE — TELEPHONE ENCOUNTER
Please tell patient that I called in a stronger antibiotic Augmentin for sinus infection and if patient symptoms do not improve he definitely will need to be seen in clinic-in person

## 2021-02-03 ENCOUNTER — TELEMEDICINE (OUTPATIENT)
Dept: INTERNAL MEDICINE | Facility: CLINIC | Age: 86
End: 2021-02-03

## 2021-02-03 DIAGNOSIS — J30.2 SEASONAL ALLERGIES: Primary | ICD-10-CM

## 2021-02-03 DIAGNOSIS — J42 CHRONIC BRONCHITIS, UNSPECIFIED CHRONIC BRONCHITIS TYPE (HCC): ICD-10-CM

## 2021-02-03 DIAGNOSIS — F33.42 RECURRENT MAJOR DEPRESSIVE DISORDER, IN FULL REMISSION (HCC): ICD-10-CM

## 2021-02-03 PROCEDURE — 99214 OFFICE O/P EST MOD 30 MIN: CPT | Performed by: INTERNAL MEDICINE

## 2021-02-03 RX ORDER — AZELASTINE 1 MG/ML
2 SPRAY, METERED NASAL 2 TIMES DAILY
Qty: 30 ML | Refills: 4 | Status: SHIPPED | OUTPATIENT
Start: 2021-02-03 | End: 2022-01-01 | Stop reason: SDUPTHER

## 2021-02-03 NOTE — PROGRESS NOTES
Subjective   Cooper Covarrubias is a 89 y.o. male.     History of Present Illness     The following portions of the patient's history were reviewed and updated as appropriate: allergies, current medications, past family history, past medical history, past social history, past surgical history and problem list.     Patient has consented to a video visit.  This video visit was initiated using [email or web address].    1 seasonal allergies-chronic and stable.  Patient has been having intermittent episodes of flare with his seasonal allergies but needs a refill on his medication.    2 anxiety- chronic stable.  Patient continues on current medication and has not had any exacerbations of his anxiety or panic attacks.    Review of Systems   All other systems reviewed and are negative.      Objective   Physical Exam  Vitals signs and nursing note reviewed.   Constitutional:       Appearance: Normal appearance. He is normal weight.   HENT:      Head: Normocephalic.      Nose: Nose normal.   Eyes:      Extraocular Movements: Extraocular movements intact.      Pupils: Pupils are equal, round, and reactive to light.   Neurological:      Mental Status: He is alert.   Psychiatric:         Mood and Affect: Mood normal.         Behavior: Behavior normal.         Thought Content: Thought content normal.         Judgment: Judgment normal.           Assessment/Plan   Diagnoses and all orders for this visit:    Spent 15 minutes face-to-face video doxy    1. Seasonal allergies (Primary)  -     azelastine (ASTELIN) 0.1 % nasal spray; 2 sprays into the nostril(s) as directed by provider 2 (Two) Times a Day. Use in each nostril as directed  Dispense: 30 mL; Refill: 4    2. Chronic bronchitis, unspecified chronic bronchitis type (CMS/HCC)-chronic and stable continue with inhalers.    3. Recurrent major depressive disorder, in full remission (CMS/HCC)-chronic and stable continue with medication no active issues at this time.

## 2021-02-24 DIAGNOSIS — F41.9 ANXIETY: ICD-10-CM

## 2021-02-25 RX ORDER — LORAZEPAM 0.5 MG/1
TABLET ORAL
Qty: 90 TABLET | Refills: 2 | Status: SHIPPED | OUTPATIENT
Start: 2021-02-25 | End: 2021-05-27

## 2021-04-03 DIAGNOSIS — F41.9 ANXIETY: ICD-10-CM

## 2021-05-20 ENCOUNTER — APPOINTMENT (OUTPATIENT)
Dept: GENERAL RADIOLOGY | Facility: HOSPITAL | Age: 86
End: 2021-05-20

## 2021-05-20 ENCOUNTER — HOSPITAL ENCOUNTER (EMERGENCY)
Facility: HOSPITAL | Age: 86
Discharge: HOME | End: 2021-05-20
Attending: EMERGENCY MEDICINE | Admitting: EMERGENCY MEDICINE

## 2021-05-20 VITALS
HEIGHT: 73 IN | OXYGEN SATURATION: 97 % | WEIGHT: 186 LBS | RESPIRATION RATE: 24 BRPM | HEART RATE: 77 BPM | DIASTOLIC BLOOD PRESSURE: 102 MMHG | TEMPERATURE: 97.8 F | SYSTOLIC BLOOD PRESSURE: 189 MMHG | BODY MASS INDEX: 24.65 KG/M2

## 2021-05-20 DIAGNOSIS — R53.1 WEAKNESS: Primary | ICD-10-CM

## 2021-05-20 DIAGNOSIS — I10 UNCONTROLLED HYPERTENSION: ICD-10-CM

## 2021-05-20 LAB
ALBUMIN SERPL-MCNC: 3.8 G/DL (ref 3.5–5.2)
ALBUMIN/GLOB SERPL: 1.3 G/DL
ALP SERPL-CCNC: 86 U/L (ref 39–117)
ALT SERPL W P-5'-P-CCNC: 13 U/L (ref 1–41)
ANION GAP SERPL CALCULATED.3IONS-SCNC: 9 MMOL/L (ref 5–15)
AST SERPL-CCNC: 17 U/L (ref 1–40)
BASOPHILS # BLD AUTO: 0.03 10*3/MM3 (ref 0–0.2)
BASOPHILS NFR BLD AUTO: 0.5 % (ref 0–1.5)
BILIRUB SERPL-MCNC: 0.5 MG/DL (ref 0–1.2)
BILIRUB UR QL STRIP: NEGATIVE
BUN SERPL-MCNC: 14 MG/DL (ref 8–23)
BUN/CREAT SERPL: 12.5 (ref 7–25)
CALCIUM SPEC-SCNC: 8.8 MG/DL (ref 8.6–10.5)
CHLORIDE SERPL-SCNC: 105 MMOL/L (ref 98–107)
CLARITY UR: CLEAR
CO2 SERPL-SCNC: 25 MMOL/L (ref 22–29)
COLOR UR: YELLOW
CREAT SERPL-MCNC: 1.12 MG/DL (ref 0.76–1.27)
DEPRECATED RDW RBC AUTO: 45.7 FL (ref 37–54)
EOSINOPHIL # BLD AUTO: 0.13 10*3/MM3 (ref 0–0.4)
EOSINOPHIL NFR BLD AUTO: 2 % (ref 0.3–6.2)
ERYTHROCYTE [DISTWIDTH] IN BLOOD BY AUTOMATED COUNT: 15.9 % (ref 12.3–15.4)
GFR SERPL CREATININE-BSD FRML MDRD: 62 ML/MIN/1.73
GLOBULIN UR ELPH-MCNC: 3 GM/DL
GLUCOSE SERPL-MCNC: 93 MG/DL (ref 65–99)
GLUCOSE UR STRIP-MCNC: NEGATIVE MG/DL
HCT VFR BLD AUTO: 34.6 % (ref 37.5–51)
HGB BLD-MCNC: 10.4 G/DL (ref 13–17.7)
HGB UR QL STRIP.AUTO: NEGATIVE
IMM GRANULOCYTES # BLD AUTO: 0.01 10*3/MM3 (ref 0–0.05)
IMM GRANULOCYTES NFR BLD AUTO: 0.2 % (ref 0–0.5)
KETONES UR QL STRIP: NEGATIVE
LEUKOCYTE ESTERASE UR QL STRIP.AUTO: NEGATIVE
LYMPHOCYTES # BLD AUTO: 1 10*3/MM3 (ref 0.7–3.1)
LYMPHOCYTES NFR BLD AUTO: 15.2 % (ref 19.6–45.3)
MCH RBC QN AUTO: 24.2 PG (ref 26.6–33)
MCHC RBC AUTO-ENTMCNC: 30.1 G/DL (ref 31.5–35.7)
MCV RBC AUTO: 80.5 FL (ref 79–97)
MONOCYTES # BLD AUTO: 0.73 10*3/MM3 (ref 0.1–0.9)
MONOCYTES NFR BLD AUTO: 11.1 % (ref 5–12)
NEUTROPHILS NFR BLD AUTO: 4.67 10*3/MM3 (ref 1.7–7)
NEUTROPHILS NFR BLD AUTO: 71 % (ref 42.7–76)
NITRITE UR QL STRIP: NEGATIVE
NRBC BLD AUTO-RTO: 0 /100 WBC (ref 0–0.2)
NT-PROBNP SERPL-MCNC: 747.2 PG/ML (ref 0–1800)
PH UR STRIP.AUTO: 7.5 [PH] (ref 5–8)
PLATELET # BLD AUTO: 222 10*3/MM3 (ref 140–450)
PMV BLD AUTO: 10.8 FL (ref 6–12)
POTASSIUM SERPL-SCNC: 4.2 MMOL/L (ref 3.5–5.2)
PROT SERPL-MCNC: 6.8 G/DL (ref 6–8.5)
PROT UR QL STRIP: NEGATIVE
QT INTERVAL: 378 MS
QTC INTERVAL: 446 MS
RBC # BLD AUTO: 4.3 10*6/MM3 (ref 4.14–5.8)
SODIUM SERPL-SCNC: 139 MMOL/L (ref 136–145)
SP GR UR STRIP: 1.01 (ref 1–1.03)
TROPONIN T SERPL-MCNC: <0.01 NG/ML (ref 0–0.03)
UROBILINOGEN UR QL STRIP: NORMAL
WBC # BLD AUTO: 6.57 10*3/MM3 (ref 3.4–10.8)

## 2021-05-20 PROCEDURE — 85025 COMPLETE CBC W/AUTO DIFF WBC: CPT | Performed by: NURSE PRACTITIONER

## 2021-05-20 PROCEDURE — 93005 ELECTROCARDIOGRAM TRACING: CPT

## 2021-05-20 PROCEDURE — 84484 ASSAY OF TROPONIN QUANT: CPT | Performed by: NURSE PRACTITIONER

## 2021-05-20 PROCEDURE — 80053 COMPREHEN METABOLIC PANEL: CPT | Performed by: NURSE PRACTITIONER

## 2021-05-20 PROCEDURE — 71045 X-RAY EXAM CHEST 1 VIEW: CPT

## 2021-05-20 PROCEDURE — 83880 ASSAY OF NATRIURETIC PEPTIDE: CPT | Performed by: NURSE PRACTITIONER

## 2021-05-20 PROCEDURE — 99284 EMERGENCY DEPT VISIT MOD MDM: CPT

## 2021-05-20 PROCEDURE — 81003 URINALYSIS AUTO W/O SCOPE: CPT | Performed by: NURSE PRACTITIONER

## 2021-05-20 RX ORDER — CEPHALEXIN 500 MG/1
500 CAPSULE ORAL 2 TIMES DAILY
COMMUNITY
End: 2021-01-01

## 2021-05-20 RX ORDER — SODIUM CHLORIDE 0.9 % (FLUSH) 0.9 %
10 SYRINGE (ML) INJECTION AS NEEDED
Status: DISCONTINUED | OUTPATIENT
Start: 2021-05-20 | End: 2021-05-21 | Stop reason: HOSPADM

## 2021-05-20 RX ORDER — CLONIDINE HYDROCHLORIDE 0.1 MG/1
0.1 TABLET ORAL ONCE
Status: COMPLETED | OUTPATIENT
Start: 2021-05-20 | End: 2021-05-20

## 2021-05-20 RX ADMIN — SODIUM CHLORIDE 1000 ML: 9 INJECTION, SOLUTION INTRAVENOUS at 21:03

## 2021-05-20 RX ADMIN — CLONIDINE HYDROCHLORIDE 0.1 MG: 0.1 TABLET ORAL at 22:03

## 2021-05-21 ENCOUNTER — EPISODE CHANGES (OUTPATIENT)
Dept: CASE MANAGEMENT | Facility: OTHER | Age: 86
End: 2021-05-21

## 2021-05-24 ENCOUNTER — PATIENT OUTREACH (OUTPATIENT)
Dept: CASE MANAGEMENT | Facility: OTHER | Age: 86
End: 2021-05-24

## 2021-05-24 ENCOUNTER — TELEPHONE (OUTPATIENT)
Dept: INTERNAL MEDICINE | Facility: CLINIC | Age: 86
End: 2021-05-24

## 2021-05-24 NOTE — TELEPHONE ENCOUNTER
Provider: MAYDA CHOWDHURY  Caller: GALLODAYANARA  Phone Number: [phone]  Reason for Call: PATIENT STATES THAT [facility] WANTS HIS RECORDS SENT TO THE OFFICE AND HE IS REQUESTING THAT SOMEONE CALL TO HAVE THEM FAXED OVER.    [facility]  [phone]-FAX  [phone]-PHONE

## 2021-05-24 NOTE — OUTREACH NOTE
Care Plan Note      Responses   Annual Wellness Visit:   Patient Will Schedule   Care Gaps Addressed  Flu Shot, Other (See Comment) [COVID VACCINE: Has had x2, 2021.]   Flu Shot Status  Up to Date   Specific Disease Process Teaching  Hypertension   Other Patient Education/Resources   24/7 [facility] Nurse Call Line   24/7 Nurse Call Line Education Method  Verbal   Does patient have depression diagnosis?  Yes   Ed Visits past 12 months:  1   Hospitalizations past 12 months  None   Medication Adherence  Medications understood        The main concerns and/or symptoms the patient would like to address are:  Patient had ED visit 5-20-21 with increased weakness/ found elevated Blood Pressure.  Patient said he feels a little better than he did.  Said he had skin cancer removed on his face by Dermatology office, which increased his anxiety.  Said he has never had a BP problem or been on BP medication before.  Reported he has checked his BP since ED visit and it has improved, tho still a little high.  Said he is independent with ADL's, Mobility (no assistive device), Driving and Medications; voiced compliance with daily medications as ordered.  Patient denied needs or concerns for Rn-ACM to address; voiced appreciation for the call.    Education/instruction provided by Care Coordinator:   Explained role of Ambulatory  and contact information.  Discussed ED discharge recommendations/ AVS.  Discussed need for Medicare AWV.  Offered to call PCP office to help with appts.  Patient declined offer and said he would call PCP office.  Discussed open Care Gap (see above).  Patient said he would call the Cardiology office as well.  Discussed education regarding HTN, including low salt diet and daily monitoring of BP, recording the BP and taking it to the next PCP appt and showing it to the PCP.  Patient voiced understanding and agreement.  Discussed 24/7 Nurse Phone Line; patient saw on the AVS and has it.       Follow Up Outreach Due:   As needed.    Zenobia Haney RN  Ambulatory     5/24/2021, 11:39 EDT

## 2021-05-24 NOTE — OUTREACH NOTE
Care Coordination Assessment    Documented/Reviewed By: Zenobia Haney RN Date/time: 5/24/2021 11:37 AM   Assessment completed with: patient  Enrolled in care management program: No  Living arrangement: family members  Support system: family  Type of residence: private residence  Home care services: No  Equipment used at home:  (Comment: BP monitor)  Communication device: No  Bed or wheelchair confined: No  Inadequate nutrition: No  Medication adherence problem: No  Experiencing side effects from current medications: No  History of fall(s) in last 6 months: No  Difficulty keeping appointments: No

## 2021-05-26 DIAGNOSIS — F41.9 ANXIETY: ICD-10-CM

## 2021-05-27 RX ORDER — LORAZEPAM 0.5 MG/1
TABLET ORAL
Qty: 90 TABLET | Refills: 1 | Status: SHIPPED | OUTPATIENT
Start: 2021-05-27 | End: 2021-05-28 | Stop reason: SDUPTHER

## 2021-05-27 NOTE — TELEPHONE ENCOUNTER
PATIENT IS SCHEDULED FOR A MED REFILL APPOINTMENT WITH DR. BELTRÁN ON 6/3/21 AT 11:30 AND ASKED FOR A TEMPORARY REFILL UNTIL THEN.

## 2021-05-28 DIAGNOSIS — F41.9 ANXIETY: ICD-10-CM

## 2021-05-28 RX ORDER — LORAZEPAM 0.5 MG/1
0.5 TABLET ORAL 3 TIMES DAILY PRN
Qty: 90 TABLET | Refills: 0 | Status: SHIPPED | OUTPATIENT
Start: 2021-05-28 | End: 2021-06-28

## 2021-06-28 ENCOUNTER — OFFICE VISIT (OUTPATIENT)
Dept: INTERNAL MEDICINE | Facility: CLINIC | Age: 86
End: 2021-06-28

## 2021-06-28 VITALS
SYSTOLIC BLOOD PRESSURE: 134 MMHG | WEIGHT: 192 LBS | HEIGHT: 73 IN | TEMPERATURE: 98 F | DIASTOLIC BLOOD PRESSURE: 78 MMHG | BODY MASS INDEX: 25.45 KG/M2 | RESPIRATION RATE: 16 BRPM | OXYGEN SATURATION: 98 % | HEART RATE: 80 BPM

## 2021-06-28 DIAGNOSIS — N39.46 MIXED STRESS AND URGE URINARY INCONTINENCE: Primary | ICD-10-CM

## 2021-06-28 DIAGNOSIS — F41.9 ANXIETY: ICD-10-CM

## 2021-06-28 PROCEDURE — 99214 OFFICE O/P EST MOD 30 MIN: CPT | Performed by: INTERNAL MEDICINE

## 2021-06-28 RX ORDER — TIOTROPIUM BROMIDE INHALATION SPRAY 3.12 UG/1
2 SPRAY, METERED RESPIRATORY (INHALATION) DAILY
Qty: 4 G | Refills: 4 | Status: SHIPPED | OUTPATIENT
Start: 2021-06-28 | End: 2021-01-01

## 2021-06-28 RX ORDER — OXYBUTYNIN CHLORIDE 15 MG/1
15 TABLET, EXTENDED RELEASE ORAL DAILY
Qty: 30 TABLET | Refills: 3 | Status: SHIPPED | OUTPATIENT
Start: 2021-06-28 | End: 2021-01-01

## 2021-06-28 RX ORDER — LORAZEPAM 1 MG/1
TABLET ORAL
Qty: 90 TABLET | Refills: 1 | Status: SHIPPED | OUTPATIENT
Start: 2021-06-28 | End: 2021-01-01

## 2021-06-28 RX ORDER — NIFEDIPINE 30 MG/1
30 TABLET, EXTENDED RELEASE ORAL DAILY
Qty: 30 TABLET | Refills: 3 | Status: SHIPPED | OUTPATIENT
Start: 2021-06-28 | End: 2021-06-30

## 2021-06-30 ENCOUNTER — TELEPHONE (OUTPATIENT)
Dept: INTERNAL MEDICINE | Facility: CLINIC | Age: 86
End: 2021-06-30

## 2021-06-30 RX ORDER — AMLODIPINE BESYLATE 2.5 MG/1
2.5 TABLET ORAL DAILY
Qty: 30 TABLET | Refills: 3 | Status: SHIPPED | OUTPATIENT
Start: 2021-06-30 | End: 2021-01-01 | Stop reason: SDUPTHER

## 2021-06-30 NOTE — TELEPHONE ENCOUNTER
Patient was started on procardia yesterday. He was ok yesterday, he just woke up in the middle of the night sweaty. He took it again today and states he feels dizzy and sweaty. His BPs have been lower than they usually run but not super low. I advised patient to sit and have something to eat and some juice for right now. I told him we would call him back and let him know where to go from here.  Please advise

## 2021-06-30 NOTE — TELEPHONE ENCOUNTER
Patient called and states he started taking Procardia 30mg and now he feels dizzy and he feels like he's running a fever. He states his b/p is 111/72 hr 76 and 116/73 hr 88. He states what should he do? He can be reached at [phone].

## 2021-06-30 NOTE — TELEPHONE ENCOUNTER
Please tell patient to discontinue taking the Procardia XL 30 mg    I am going to start him at a lower dosage of the amlodipine 2.5 mg dosage to see if this helps decrease side effects at the same time provide some blood pressure control.    Continue to monitor blood pressure and make a follow-up appointment if symptoms do not improve

## 2021-06-30 NOTE — TELEPHONE ENCOUNTER
S/W pt, informed that Dr Nielsen said for him to stop taking the Procardia XL and that he is going to start him on amlodipine 2.5 mg dosage to see if this helps decrease side effects at the same time provide some blood pressure control. Inst to continue to monitor blood pressure and make a follow-up appointment if symptoms do not improve. Verb good understanding, agreement and apprec.

## 2021-09-03 NOTE — TELEPHONE ENCOUNTER
Caller: Cooper Covarrubias    Relationship: Self    Best call back number:[phone]    Medication needed:   Requested Prescriptions     Pending Prescriptions Disp Refills   • LORazepam (ATIVAN) 1 MG tablet [Pharmacy Med Name: LORazepam 1 MG TABLET] 90 tablet      Sig: TAKE ONE TABLET BY MOUTH THREE TIMES A DAY AS NEEDED       When do you need the refill by: ASAP    What additional details did the patient provide when requesting the medication: PATIENT STATED THAT HE WILL BE OUT OVER WEEKEND AND WOULD NEED REFILL ASAP    Does the patient have less than a 3 day supply:  [x] Yes  [] No    What is the patient's preferred pharmacy: [address] [place] [address] [phone] [facility] [phone]

## 2021-09-15 NOTE — PROGRESS NOTES
"Chief Complaint  Annual Exam    Subjective    Cooper Covarrubias is a 89 y.o. male.     Cooper Covarrubias presents to [facility] for       History of Present Illness    The following portions of the patient's history were reviewed and updated as appropriate: allergies, current medications, past family history, past medical history, past social history, past surgical history and problem list.    1 COPD-chronic and stable.  Patient continues with inhalers and this has been doing very well with control of his respiratory symptoms.    2 BPH-chronic and patient continues with the tamsulosin but this medication appears not to be handling his BPH as well as he is having more nocturia, and occasional urinary incontinence issues throughout the day no dysuria, no fever, no chills, no nausea, no vomiting, no other systemic symptoms.    3 HTN- chronic and doing well.  Patient denies any shortness of breath, chest pain, nausea, vomiting, headache, T, or any other systemic signs.  Continues on blood pressure medications.    4 basal cell carcinoma-patient has been evaluated by dermatology and recommendations that he undergo some form of radiation therapy for localized basal cell carcinoma localized to the left portion of his nare on nose.    5 urinary incontinence-chronic, recurrent and worsening.  As previously stated with the BPH patient also has been having more bladder spasms and urinary incontinence.    6 depression-patient continues on the Zoloft 50 mg by mouth once a day and this medication has not been controlling his depression as he has been having more episodes of feeling tearful, lonely, and more moodiness.  Patient would like to consider increasing the Zoloft dosage.      Review of Systems   All other systems reviewed and are negative.      Objective   Vital Signs:   /70   Pulse 74   Temp 98.2 °F (36.8 °C) (Temporal)   Resp 20   Ht 182.9 cm (72\")   Wt 87.7 kg (193 " lb 4 oz)   SpO2 97%   BMI 26.21 kg/m²     Body mass index is 26.21 kg/m².    Physical Exam  Vitals reviewed.   Constitutional:       Appearance: Normal appearance. He is normal weight.   HENT:      Head: Normocephalic and atraumatic.      Nose: Nose normal.      Mouth/Throat:      Mouth: Mucous membranes are moist.   Eyes:      Extraocular Movements: Extraocular movements intact.      Pupils: Pupils are equal, round, and reactive to light.   Cardiovascular:      Rate and Rhythm: Normal rate and regular rhythm.      Pulses: Normal pulses.      Heart sounds: Normal heart sounds.   Pulmonary:      Effort: Pulmonary effort is normal.      Breath sounds: Normal breath sounds.   Musculoskeletal:         General: Normal range of motion.      Cervical back: Normal range of motion and neck supple.   Skin:     General: Skin is warm.      Capillary Refill: Capillary refill takes less than 2 seconds.   Neurological:      General: No focal deficit present.      Mental Status: He is alert.   Psychiatric:         Mood and Affect: Mood normal.         Behavior: Behavior normal.         Thought Content: Thought content normal.         Judgment: Judgment normal.               Assessment and Plan  Diagnoses and all orders for this visit:      Diagnoses and all orders for this visit:    1. Recurrent major depressive disorder, in full remission (CMS/[facility]) (Primary)  -     sertraline (Zoloft) 100 MG tablet; Take 1 tablet by mouth Daily.  Dispense: 90 tablet; Refill: 2    2. Anxiety  (Dosage increase)  -     sertraline (Zoloft) 100 MG tablet; Take 1 tablet by mouth Daily.  Dispense: 90 tablet; Refill: 2    Side effects and precautions of the new medication(s) have been discussed with patient  I have answered patients questions thoroughly to their understanding.  If patient should experience any side effects from the medication, a follow up appointment should be made.      3. Urinary urgency  -     Ambulatory Referral to Urology    4.  Mixed stress and urge urinary incontinence  -     Ambulatory Referral to Urology    5. BPH with urinary obstruction  -     Ambulatory Referral to Urology    6. Basal cell carcinoma (BCC) of skin of nose-patient questions concerns in regards to starting with radiation therapy but patient would like to hold off and consider this therapy not at this time.

## 2021-10-05 NOTE — TELEPHONE ENCOUNTER
Caller: Cooper Covarrubias    Relationship: Self    Medication requested (name and dosage): LORazepam (ATIVAN) 1 MG tablet    Pharmacy where request should be sent: [facility] [address] - [phone]  - [phone]   [phone]    Additional details provided by patient: PATIENT WILL BE OUT 10/06    Best call back number: [phone]    Does the patient have less than a 3 day supply:  [x] Yes  [] No    Yoanna Díaz Rep   10/05/21 12:59 EDT

## 2021-10-06 NOTE — TELEPHONE ENCOUNTER
Caller: Cooper Covarrubias    Relationship: Self      Medication requested (name and dosage): LORazepam (ATIVAN) 1 MG tablet    Pharmacy where request should be sent: [facility] [address] - [phone]  - [phone]   [phone]    Additional details provided by patient: OUT OF MEDICATION. PATIENT STATES THE NEW PRESCRIPTION FOR THE LORazepam (ATIVAN) 1 MG tablet WILL NEED TO BE CALLED INTO THE PHARMACY TO REPLACE THE OLD PRESCRIPTION THAT IS CURRENTLY AT THE PHARMACY.     Best call back number: [phone]     Does the patient have less than a 3 day supply:  [x] Yes  [] No    Yoanna Flores Rep   10/06/21 12:34 EDT

## 2021-10-06 NOTE — TELEPHONE ENCOUNTER
Caller: Cooper Covarrubias    Relationship: Self    Best call back number:[phone]    What is the best time to reach you: ANY TIME    Who are you requesting to speak with (clinical staff, provider,  specific staff member): NURSE    Do you know the name of the person who called: SELF    What was the call regarding: PATIENT CALLED CHECKING THE STATUS OF THE REFILL FOR HIS MEDICATION.    Do you require a callback: YES

## 2021-10-28 NOTE — TELEPHONE ENCOUNTER
----- Message from Lb Nielsen MD sent at 10/28/2021  1:43 PM EDT -----  Regarding: Follow-up for Medicare wellness exam  Patient will need to schedule for a wellness exam at some point in the next several months

## 2021-10-28 NOTE — PROGRESS NOTES
Chief Complaint  No chief complaint on file.    Subjective    Cooper Covarrubias is a 89 y.o. male.     Cooper Covarrubias presents to [facility] for       History of Present Illness    Patient is consented for Tele phone visit    1 anxiety- chronic and controlled.  Patient continues on the Ativan and this medication has been doing very well with control of his anxiety.  Patient denies any recent panic attacks or any other emotional concerns at this time.    2 HTN-uncontrolled.  Patient denies any shortness of breath, chest pain, nausea, vomiting, headache, fatigue, or any other systemic signs.  Continues on low-sodium diet and continues on amlodipine 2.5 mg by mouth once a day.    3 BPH-patient says that the Flomax is not helping as much as it should and he continues also with the oxybutynin.  Both these medications have been used and only see minimal response and efficacy and therefore patient is following up with urology tomorrow.              The following portions of the patient's history were reviewed and updated as appropriate: allergies, current medications, past family history, past medical history, past social history, past surgical history and problem list.    Review of Systems   All other systems reviewed and are negative.      Objective   Vital Signs:   There were no vitals taken for this visit.    There is no height or weight on file to calculate BMI.    Physical Exam  Vitals and nursing note reviewed.   Skin:     General: Skin is warm.      Capillary Refill: Capillary refill takes less than 2 seconds.   Neurological:      General: No focal deficit present.      Mental Status: Mental status is at baseline.   Psychiatric:         Mood and Affect: Mood normal.         Behavior: Behavior normal.         Thought Content: Thought content normal.         Judgment: Judgment normal.               Assessment and Plan  Diagnoses and all orders for this visit:    Spent  approximately 11 minutes on telephone with patient        Diagnoses and all orders for this visit:    1. Anxiety (Primary)-continue on current medications.    2. BPH with urinary obstruction-continue on current medications and follow-up with urology.

## 2021-11-08 NOTE — TELEPHONE ENCOUNTER
Caller: Cooper Covarrubias    Relationship: Self    Best call back number: [phone]    What are your current symptoms:   FREQUENT URINATION     How long have you been experiencing symptoms:   TWO DAYS     Have you had these symptoms before:    [x] Yes  [] No    Have you been treated for these symptoms before:   [x] Yes  [] No    If a prescription is needed, what is your preferred pharmacy and phone number:      [facility] [address] [place] [address] - [phone] [facility] [phone]   [phone]    Additional notes:  PATIENT STATED THAT THE LAST TWO DAYS HE HAS BEEN TAKING HIS MEDICATION   tamsulosin (FLOMAX) 0.4 MG capsule 24 hr capsule   oxybutynin XL (DITROPAN XL) 15 MG 24 hr tablet     AT THE SAME TIME AND HAS BEEN HAVING FREQUENT URINATION AND HAS BEEN DRINKING A LOT OF WATER AND GATORADE BUT IS WORRIED ABOUT BECOMING DEHYDRATED FROM THE FREQUENT URINATION. PATIENT WOULD LIKE SOMETHING CALLED INTO THE PHARMACY TO HELP HIM STOP URINATING SO FREQUENT.

## 2021-11-11 NOTE — TELEPHONE ENCOUNTER
I did call in the [facility] for his BPH treatment in regards to his urine frequency and urgency patient would have to be seen and evaluated to rule out other causes or it could be due to his prostate if he has been off the Flomax

## 2021-11-11 NOTE — TELEPHONE ENCOUNTER
Spoke to pt, advised of clinical message. Pt states he's been off his Flomax for the past 2 days, and his urine output has decreased. Since he stopped the Flomax for the past 2 days, he's been drinking, cranberry juice, water, and coffee. Pt has been scheduled for a F/U next week with PCP on 11/18/2021 @ 2:30 pm. Good verbal understanding.     TIFFANIE

## 2021-11-18 NOTE — PROGRESS NOTES
Chief Complaint  Benign Prostatic Hypertrophy    Subjective    Cooper Covarrubias is a 89 y.o. male.     Cooper Covarrubias presents to [facility] for       History of Present Illness    The following portions of the patient's history were reviewed and updated as appropriate: allergies, current medications, past family history, past medical history, past social history, past surgical history and problem list.    Review of Systems    BPH- Patient says that when he take the flomax it cuases him to increase urinary symtpoms, but if he stops it his urine normal.  Patient denies any dysuria, urgency, frequency, fever, chills, or any other systemic symptoms.    Patient's blood pressure is slightly elevated as he has not taken his blood pressure yet today.    Objective   Vital Signs:   /80   Pulse 63   Temp 98.6 °F (37 °C) (Temporal)   Resp 16   Wt 85.6 kg (188 lb 12.8 oz)   SpO2 98%   BMI 25.61 kg/m²     Body mass index is 25.61 kg/m².    Physical Exam  Vitals and nursing note reviewed.   Constitutional:       Appearance: Normal appearance. He is normal weight.   HENT:      Head: Normocephalic and atraumatic.      Right Ear: Tympanic membrane, ear canal and external ear normal.      Left Ear: Tympanic membrane, ear canal and external ear normal.      Nose: Nose normal.      Mouth/Throat:      Mouth: Mucous membranes are moist.   Eyes:      Extraocular Movements: Extraocular movements intact.      Pupils: Pupils are equal, round, and reactive to light.   Cardiovascular:      Rate and Rhythm: Normal rate and regular rhythm.      Pulses: Normal pulses.      Heart sounds: Normal heart sounds.   Pulmonary:      Effort: Pulmonary effort is normal.   Abdominal:      General: Abdomen is flat.   Musculoskeletal:         General: Normal range of motion.      Cervical back: Normal range of motion and neck supple.   Skin:     General: Skin is warm.      Capillary Refill:  Capillary refill takes less than 2 seconds.   Neurological:      General: No focal deficit present.      Mental Status: He is alert.   Psychiatric:         Mood and Affect: Mood normal.         Behavior: Behavior normal.         Thought Content: Thought content normal.         Judgment: Judgment normal.               Assessment and Plan  Diagnoses and all orders for this visit:      Diagnoses and all orders for this visit:    1. BPH with urinary obstruction (Primary)    Discontinue the Flomax for now.  Continue observation of symptoms.

## 2021-12-08 NOTE — TELEPHONE ENCOUNTER
Caller: Cooper Covarrubias    Relationship to patient: Self    Best call back number: [phone]    Chief complaint: NIGHT TIME URINATION FOLLOW UP    Type of visit: OFFICE VISIT     Requested date: ASAP    If rescheduling, when is the original appointment: N/A     Additional notes: PATIENT STATED THAT HE WOULD NEED APPOINTMENT TO SEE  AFTER 10AM    PLEASE ADVISE

## 2021-12-21 NOTE — PROGRESS NOTES
Chief Complaint  Urinary Frequency (Nighttime)    Subjective    Cooper Covarrubias is a 89 y.o. male.     Cooper Covarrubias presents to [facility] for       History of Present Illness    The following portions of the patient's history were reviewed and updated as appropriate: allergies, current medications, past family history, past medical history, past social history, past surgical history and problem list.     1. Nocturia has worsening-patient says that his nocturia has been coming worse especially at night and throughout the day.  Patient says that he continues with the Flomax and the oxybutynin and neither these medications have been helping.  At times he has been experiencing more dizziness and lightheadedness and fatigue since being on both these medications.    2 HTN-chronic and doing fair.  Patient denies any side effects of the medication.  Patient denies any shortness of breath, chest pain, nausea, vomiting or headache or fatigue, or any other systems.    3 anxiety/depresson - daughter states that she may be concerned that he may be experiencing some issues with mild anxiety and depression henceforth this has increased over the past 1 to 2 months.  No evidence or concerns of any suicidal ideations      Review of Systems   All other systems reviewed and are negative.      Objective   Vital Signs:   /90 (BP Location: Right arm, Patient Position: Sitting, Cuff Size: Adult)   Pulse 71   Temp 98.7 °F (37.1 °C) (Temporal)   Resp 20   Wt 85.7 kg (189 lb)   SpO2 97%   BMI 25.63 kg/m²     Body mass index is 25.63 kg/m².    Physical Exam  Vitals and nursing note reviewed.   Constitutional:       Appearance: Normal appearance. He is normal weight.   HENT:      Head: Normocephalic and atraumatic.      Right Ear: Tympanic membrane, ear canal and external ear normal.      Left Ear: Tympanic membrane, ear canal and external ear normal.      Nose: Nose normal.       Mouth/Throat:      Mouth: Mucous membranes are moist.   Eyes:      Pupils: Pupils are equal, round, and reactive to light.   Cardiovascular:      Rate and Rhythm: Normal rate.      Pulses: Normal pulses.   Pulmonary:      Effort: Pulmonary effort is normal.   Abdominal:      General: Abdomen is flat.   Musculoskeletal:         General: Normal range of motion.      Cervical back: Normal range of motion and neck supple.   Skin:     General: Skin is warm.      Capillary Refill: Capillary refill takes less than 2 seconds.   Neurological:      General: No focal deficit present.      Mental Status: He is alert.   Psychiatric:         Mood and Affect: Mood normal.         Behavior: Behavior normal.         Thought Content: Thought content normal.         Judgment: Judgment normal.               Assessment and Plan  Diagnoses and all orders for this visit:    Diagnoses and all orders for this visit:    Been approximately 35 minutes face-to-face with patient and family members (daughters)    1. Anxiety (Primary)  -     LORazepam (ATIVAN) 0.5 MG tablet; Take 1 tablet by mouth Every 8 (Eight) Hours As Needed for Anxiety.  Dispense: 90 tablet; Refill: 1  -     sertraline (Zoloft) 50 MG tablet; Take 1 tablet by mouth Daily.  Dispense: 90 tablet; Refill: 2  -     Ambulatory Referral to Urology  -     Ambulatory Referral to Behavioral Health    2. Recurrent major depressive disorder, in full remission (HCC)  -     LORazepam (ATIVAN) 0.5 MG tablet; Take 1 tablet by mouth Every 8 (Eight) Hours As Needed for Anxiety.  Dispense: 90 tablet; Refill: 1  -     sertraline (Zoloft) 50 MG tablet; Take 1 tablet by mouth Daily.  Dispense: 90 tablet; Refill: 2  -     Ambulatory Referral to Urology    3. Mild episode of recurrent major depressive disorder (HCC)  -     Ambulatory Referral to Behavioral Health    Overall medical management  Decrease temezepa to 0.5mg because of concerns of side effects with the benzodiazepine    Decrease the  zoloft 50mg because of concerns with possible interference with SSRIs and sedation and dizziness    D/c flomax and oxybutinin for now due to dizziness

## 2021-12-28 NOTE — TELEPHONE ENCOUNTER
Caller: Cooper Covarrubias    Relationship: Self    Best call back number: [phone]    What is the best time to reach you: AS SOON AS POSSIBLE     Who are you requesting to speak with (clinical staff, provider,  specific staff member): NURSE      What was the call regarding: THE PATIENT STATES THAT HE WAS PRESCRIBED SERTRALINE 50 MG BY DOCTOR LUCIANA THE PATIENT STATES THAT HE IS EXPERIENCING COLD SWEATS WITH DRY MOUTH THE PATIENT STATES THAT HE CAN NOT SLEEP DUE TO THE MEDICATION THE PATIENT WANTS TO STOP TAKING THE MEDICATION HE WOULD LIKE A  CALL BACK AS SOON AS POSSIBLE TO DISCUSS     Do you require a callback: YES

## 2021-12-29 NOTE — TELEPHONE ENCOUNTER
If patient thinks that the symptoms are coming from the Zoloft we can discontinue it.  I would recommend decreasing to 25 mg (i.e. cutting the 50 mg tablet in half)    Take the 25 mg of the Zoloft for the next 5 days and then discontinue    Continue to monitor his symptoms and any concerns of possible withdrawal from the medication

## 2021-12-29 NOTE — TELEPHONE ENCOUNTER
Pt called he is having side effects due to one of the medications he is taking and is wanting to speak to someone about it.

## 2022-01-01 ENCOUNTER — TELEPHONE (OUTPATIENT)
Dept: INTERNAL MEDICINE | Facility: CLINIC | Age: 87
End: 2022-01-01

## 2022-01-01 ENCOUNTER — LAB (OUTPATIENT)
Dept: LAB | Facility: HOSPITAL | Age: 87
End: 2022-01-01

## 2022-01-01 ENCOUNTER — APPOINTMENT (OUTPATIENT)
Dept: GENERAL RADIOLOGY | Facility: HOSPITAL | Age: 87
End: 2022-01-01

## 2022-01-01 ENCOUNTER — APPOINTMENT (OUTPATIENT)
Dept: CT IMAGING | Facility: HOSPITAL | Age: 87
End: 2022-01-01

## 2022-01-01 ENCOUNTER — APPOINTMENT (OUTPATIENT)
Dept: NEUROLOGY | Facility: HOSPITAL | Age: 87
End: 2022-01-01

## 2022-01-01 ENCOUNTER — HOSPITAL ENCOUNTER (INPATIENT)
Facility: HOSPITAL | Age: 87
LOS: 2 days | Discharge: HOSPICE | End: 2022-06-03
Attending: EMERGENCY MEDICINE | Admitting: INTERNAL MEDICINE

## 2022-01-01 ENCOUNTER — OFFICE VISIT (OUTPATIENT)
Dept: INTERNAL MEDICINE | Facility: CLINIC | Age: 87
End: 2022-01-01

## 2022-01-01 ENCOUNTER — HOSPITAL ENCOUNTER (INPATIENT)
Facility: HOSPITAL | Age: 87
LOS: 7 days | Discharge: NURSING, REHAB OR LONG-TERM CARE | End: 2022-05-28
Attending: EMERGENCY MEDICINE | Admitting: INTERNAL MEDICINE

## 2022-01-01 ENCOUNTER — HOSPITAL ENCOUNTER (INPATIENT)
Facility: HOSPITAL | Age: 87
LOS: 10 days | End: 2022-07-17
Attending: INTERNAL MEDICINE | Admitting: INTERNAL MEDICINE

## 2022-01-01 ENCOUNTER — APPOINTMENT (OUTPATIENT)
Dept: MRI IMAGING | Facility: HOSPITAL | Age: 87
End: 2022-01-01

## 2022-01-01 ENCOUNTER — READMISSION MANAGEMENT (OUTPATIENT)
Dept: CALL CENTER | Facility: HOSPITAL | Age: 87
End: 2022-01-01

## 2022-01-01 ENCOUNTER — APPOINTMENT (OUTPATIENT)
Dept: CARDIOLOGY | Facility: HOSPITAL | Age: 87
End: 2022-01-01

## 2022-01-01 ENCOUNTER — HOSPITAL ENCOUNTER (OUTPATIENT)
Facility: HOSPITAL | Age: 87
Setting detail: OBSERVATION
LOS: 2 days | End: 2022-07-07
Attending: STUDENT IN AN ORGANIZED HEALTH CARE EDUCATION/TRAINING PROGRAM | Admitting: STUDENT IN AN ORGANIZED HEALTH CARE EDUCATION/TRAINING PROGRAM

## 2022-01-01 VITALS
OXYGEN SATURATION: 97 % | SYSTOLIC BLOOD PRESSURE: 122 MMHG | BODY MASS INDEX: 21.26 KG/M2 | TEMPERATURE: 97.4 F | DIASTOLIC BLOOD PRESSURE: 61 MMHG | WEIGHT: 157 LBS | HEIGHT: 72 IN | HEART RATE: 89 BPM | RESPIRATION RATE: 16 BRPM

## 2022-01-01 VITALS
OXYGEN SATURATION: 98 % | DIASTOLIC BLOOD PRESSURE: 62 MMHG | TEMPERATURE: 98.4 F | SYSTOLIC BLOOD PRESSURE: 128 MMHG | WEIGHT: 190 LBS | BODY MASS INDEX: 25.77 KG/M2 | HEART RATE: 71 BPM | RESPIRATION RATE: 20 BRPM

## 2022-01-01 VITALS
DIASTOLIC BLOOD PRESSURE: 74 MMHG | TEMPERATURE: 97.5 F | HEIGHT: 74 IN | BODY MASS INDEX: 26.05 KG/M2 | SYSTOLIC BLOOD PRESSURE: 146 MMHG | RESPIRATION RATE: 20 BRPM | OXYGEN SATURATION: 96 % | HEART RATE: 47 BPM | WEIGHT: 203 LBS

## 2022-01-01 VITALS
DIASTOLIC BLOOD PRESSURE: 61 MMHG | SYSTOLIC BLOOD PRESSURE: 97 MMHG | TEMPERATURE: 94.5 F | HEIGHT: 72 IN | BODY MASS INDEX: 26.68 KG/M2 | HEART RATE: 94 BPM | WEIGHT: 197 LBS | RESPIRATION RATE: 16 BRPM | OXYGEN SATURATION: 94 %

## 2022-01-01 VITALS
WEIGHT: 198 LBS | OXYGEN SATURATION: 99 % | TEMPERATURE: 97.8 F | DIASTOLIC BLOOD PRESSURE: 82 MMHG | RESPIRATION RATE: 20 BRPM | BODY MASS INDEX: 26.85 KG/M2 | SYSTOLIC BLOOD PRESSURE: 138 MMHG | HEART RATE: 74 BPM

## 2022-01-01 VITALS — RESPIRATION RATE: 14 BRPM

## 2022-01-01 DIAGNOSIS — F33.42 RECURRENT MAJOR DEPRESSIVE DISORDER, IN FULL REMISSION: ICD-10-CM

## 2022-01-01 DIAGNOSIS — N39.46 MIXED STRESS AND URGE URINARY INCONTINENCE: ICD-10-CM

## 2022-01-01 DIAGNOSIS — E87.1 HYPONATREMIA: ICD-10-CM

## 2022-01-01 DIAGNOSIS — F41.1 GENERALIZED ANXIETY DISORDER: ICD-10-CM

## 2022-01-01 DIAGNOSIS — N13.8 BPH WITH URINARY OBSTRUCTION: Primary | ICD-10-CM

## 2022-01-01 DIAGNOSIS — R53.1 GENERALIZED WEAKNESS: Primary | ICD-10-CM

## 2022-01-01 DIAGNOSIS — L30.9 DERMATITIS: ICD-10-CM

## 2022-01-01 DIAGNOSIS — F41.9 ANXIETY DISORDER, UNSPECIFIED TYPE: ICD-10-CM

## 2022-01-01 DIAGNOSIS — R13.10 DYSPHAGIA, UNSPECIFIED TYPE: ICD-10-CM

## 2022-01-01 DIAGNOSIS — F41.9 ANXIETY: ICD-10-CM

## 2022-01-01 DIAGNOSIS — J30.2 SEASONAL ALLERGIES: ICD-10-CM

## 2022-01-01 DIAGNOSIS — R62.7 FAILURE TO THRIVE IN ADULT: ICD-10-CM

## 2022-01-01 DIAGNOSIS — M54.50 LOW BACK PAIN, UNSPECIFIED BACK PAIN LATERALITY, UNSPECIFIED CHRONICITY, UNSPECIFIED WHETHER SCIATICA PRESENT: ICD-10-CM

## 2022-01-01 DIAGNOSIS — D64.9 ANEMIA, UNSPECIFIED TYPE: Primary | ICD-10-CM

## 2022-01-01 DIAGNOSIS — Z91.81 RISK FOR FALLS: ICD-10-CM

## 2022-01-01 DIAGNOSIS — J44.9 CHRONIC OBSTRUCTIVE PULMONARY DISEASE, UNSPECIFIED COPD TYPE: Primary | ICD-10-CM

## 2022-01-01 DIAGNOSIS — E22.2 SIADH (SYNDROME OF INAPPROPRIATE ADH PRODUCTION): Primary | ICD-10-CM

## 2022-01-01 DIAGNOSIS — N39.0 ACUTE UTI: ICD-10-CM

## 2022-01-01 DIAGNOSIS — D22.9 NEVUS: ICD-10-CM

## 2022-01-01 DIAGNOSIS — N13.8 BPH WITH URINARY OBSTRUCTION: ICD-10-CM

## 2022-01-01 DIAGNOSIS — M79.89 SWELLING OF LOWER EXTREMITY: ICD-10-CM

## 2022-01-01 DIAGNOSIS — N40.1 BPH WITH URINARY OBSTRUCTION: Primary | ICD-10-CM

## 2022-01-01 DIAGNOSIS — N40.1 BPH WITH URINARY OBSTRUCTION: ICD-10-CM

## 2022-01-01 DIAGNOSIS — N17.9 AKI (ACUTE KIDNEY INJURY): ICD-10-CM

## 2022-01-01 DIAGNOSIS — L85.3 DRY SKIN: ICD-10-CM

## 2022-01-01 DIAGNOSIS — R77.8 ELEVATED TROPONIN: ICD-10-CM

## 2022-01-01 DIAGNOSIS — R60.0 LOCALIZED EDEMA: ICD-10-CM

## 2022-01-01 DIAGNOSIS — H90.6 MIXED CONDUCTIVE AND SENSORINEURAL HEARING LOSS OF BOTH EARS: ICD-10-CM

## 2022-01-01 DIAGNOSIS — J44.9 CHRONIC OBSTRUCTIVE PULMONARY DISEASE, UNSPECIFIED COPD TYPE: ICD-10-CM

## 2022-01-01 DIAGNOSIS — J96.21 ACUTE ON CHRONIC RESPIRATORY FAILURE WITH HYPOXIA: Primary | ICD-10-CM

## 2022-01-01 LAB
A/C: ABNORMAL
ALBUMIN SERPL-MCNC: 2.3 G/DL (ref 3.5–5.2)
ALBUMIN SERPL-MCNC: 2.5 G/DL (ref 3.5–5.2)
ALBUMIN SERPL-MCNC: 2.8 G/DL (ref 3.5–5.2)
ALBUMIN SERPL-MCNC: 2.9 G/DL (ref 3.5–5.2)
ALBUMIN SERPL-MCNC: 3.3 G/DL (ref 3.5–5.2)
ALBUMIN SERPL-MCNC: 3.6 G/DL (ref 3.5–5.2)
ALBUMIN/GLOB SERPL: 0.6 G/DL
ALBUMIN/GLOB SERPL: 0.7 G/DL
ALBUMIN/GLOB SERPL: 1 G/DL
ALBUMIN/GLOB SERPL: 1.3 G/DL
ALBUMIN/GLOB SERPL: 1.3 G/DL
ALDOST SERPL-MCNC: 1.8 NG/DL (ref 0–30)
ALP SERPL-CCNC: 107 U/L (ref 39–117)
ALP SERPL-CCNC: 139 U/L (ref 39–117)
ALP SERPL-CCNC: 139 U/L (ref 39–117)
ALP SERPL-CCNC: 87 U/L (ref 39–117)
ALP SERPL-CCNC: 98 U/L (ref 39–117)
ALT SERPL W P-5'-P-CCNC: 11 U/L (ref 1–41)
ALT SERPL W P-5'-P-CCNC: 12 U/L (ref 1–41)
ALT SERPL W P-5'-P-CCNC: 32 U/L (ref 1–41)
ALT SERPL W P-5'-P-CCNC: 52 U/L (ref 1–41)
ALT SERPL-CCNC: 22 U/L (ref 1–41)
AMMONIA BLD-SCNC: 16 UMOL/L (ref 16–60)
AMORPH URATE CRY URNS QL MICRO: ABNORMAL /HPF
ANION GAP SERPL CALCULATED.3IONS-SCNC: 10 MMOL/L (ref 5–15)
ANION GAP SERPL CALCULATED.3IONS-SCNC: 10 MMOL/L (ref 5–15)
ANION GAP SERPL CALCULATED.3IONS-SCNC: 5 MMOL/L (ref 5–15)
ANION GAP SERPL CALCULATED.3IONS-SCNC: 5 MMOL/L (ref 5–15)
ANION GAP SERPL CALCULATED.3IONS-SCNC: 7 MMOL/L (ref 5–15)
ANION GAP SERPL CALCULATED.3IONS-SCNC: 8 MMOL/L (ref 5–15)
ANION GAP SERPL CALCULATED.3IONS-SCNC: 9 MMOL/L (ref 5–15)
ANISOCYTOSIS BLD QL: ABNORMAL
ARTERIAL PATENCY WRIST A: ABNORMAL
AST SERPL-CCNC: 13 U/L (ref 1–40)
AST SERPL-CCNC: 14 U/L (ref 1–40)
AST SERPL-CCNC: 19 U/L (ref 1–40)
AST SERPL-CCNC: 25 U/L (ref 1–40)
AST SERPL-CCNC: 64 U/L (ref 1–40)
ATMOSPHERIC PRESS: ABNORMAL MM[HG]
B PARAPERT DNA SPEC QL NAA+PROBE: NOT DETECTED
B PARAPERT DNA SPEC QL NAA+PROBE: NOT DETECTED
B PERT DNA SPEC QL NAA+PROBE: NOT DETECTED
B PERT DNA SPEC QL NAA+PROBE: NOT DETECTED
BACTERIA SPEC AEROBE CULT: ABNORMAL
BACTERIA SPEC AEROBE CULT: ABNORMAL
BACTERIA SPEC AEROBE CULT: NO GROWTH
BACTERIA SPEC AEROBE CULT: NORMAL
BACTERIA UR QL AUTO: ABNORMAL /HPF
BACTERIA UR QL AUTO: ABNORMAL /HPF
BASE EXCESS BLDA CALC-SCNC: 6.7 MMOL/L (ref 0–2)
BASOPHILS # BLD AUTO: 0 10*3/MM3 (ref 0–0.2)
BASOPHILS # BLD AUTO: 0.01 10*3/MM3 (ref 0–0.2)
BASOPHILS # BLD AUTO: 0.01 10*3/MM3 (ref 0–0.2)
BASOPHILS # BLD AUTO: 0.03 10*3/MM3 (ref 0–0.2)
BASOPHILS # BLD MANUAL: 0 10*3/MM3 (ref 0–0.2)
BASOPHILS NFR BLD AUTO: 0 % (ref 0–1.5)
BASOPHILS NFR BLD AUTO: 0.1 % (ref 0–1.5)
BASOPHILS NFR BLD AUTO: 0.3 % (ref 0–1.5)
BASOPHILS NFR BLD AUTO: 0.4 % (ref 0–1.5)
BASOPHILS NFR BLD MANUAL: 0 % (ref 0–1.5)
BDY SITE: ABNORMAL
BILIRUB SERPL-MCNC: 0.3 MG/DL (ref 0–1.2)
BILIRUB SERPL-MCNC: 0.3 MG/DL (ref 0–1.2)
BILIRUB SERPL-MCNC: 0.4 MG/DL (ref 0–1.2)
BILIRUB SERPL-MCNC: 0.5 MG/DL (ref 0–1.2)
BILIRUB SERPL-MCNC: 0.5 MG/DL (ref 0–1.2)
BILIRUB UR QL STRIP: NEGATIVE
BODY TEMPERATURE: 37 C
BUN SERPL-MCNC: 20 MG/DL (ref 8–23)
BUN SERPL-MCNC: 22 MG/DL (ref 8–23)
BUN SERPL-MCNC: 23 MG/DL (ref 8–23)
BUN SERPL-MCNC: 26 MG/DL (ref 8–23)
BUN SERPL-MCNC: 27 MG/DL (ref 8–23)
BUN SERPL-MCNC: 28 MG/DL (ref 8–23)
BUN SERPL-MCNC: 31 MG/DL (ref 8–23)
BUN SERPL-MCNC: 32 MG/DL (ref 8–23)
BUN SERPL-MCNC: 33 MG/DL (ref 8–23)
BUN SERPL-MCNC: 41 MG/DL (ref 8–23)
BUN SERPL-MCNC: 41 MG/DL (ref 8–23)
BUN SERPL-MCNC: 42 MG/DL (ref 8–23)
BUN SERPL-MCNC: 54 MG/DL (ref 8–23)
BUN SERPL-MCNC: 57 MG/DL (ref 8–23)
BUN/CREAT SERPL: 19.8 (ref 7–25)
BUN/CREAT SERPL: 22.7 (ref 7–25)
BUN/CREAT SERPL: 24.6 (ref 7–25)
BUN/CREAT SERPL: 25 (ref 7–25)
BUN/CREAT SERPL: 25.4 (ref 7–25)
BUN/CREAT SERPL: 25.4 (ref 7–25)
BUN/CREAT SERPL: 26.1 (ref 7–25)
BUN/CREAT SERPL: 26.2 (ref 7–25)
BUN/CREAT SERPL: 27.4 (ref 7–25)
BUN/CREAT SERPL: 28 (ref 7–25)
BUN/CREAT SERPL: 28.5 (ref 7–25)
BUN/CREAT SERPL: 28.9 (ref 7–25)
BUN/CREAT SERPL: 29.8 (ref 7–25)
BUN/CREAT SERPL: 35.3 (ref 7–25)
BUN/CREAT SERPL: 36.2 (ref 7–25)
BUN/CREAT SERPL: 41.3 (ref 7–25)
C PNEUM DNA NPH QL NAA+NON-PROBE: NOT DETECTED
C PNEUM DNA NPH QL NAA+NON-PROBE: NOT DETECTED
CALCIUM SERPL-MCNC: 9.1 MG/DL (ref 8.2–9.6)
CALCIUM SPEC-SCNC: 7.9 MG/DL (ref 8.2–9.6)
CALCIUM SPEC-SCNC: 8.3 MG/DL (ref 8.2–9.6)
CALCIUM SPEC-SCNC: 8.4 MG/DL (ref 8.2–9.6)
CALCIUM SPEC-SCNC: 8.5 MG/DL (ref 8.2–9.6)
CALCIUM SPEC-SCNC: 8.6 MG/DL (ref 8.2–9.6)
CALCIUM SPEC-SCNC: 8.8 MG/DL (ref 8.2–9.6)
CALCIUM SPEC-SCNC: 8.9 MG/DL (ref 8.2–9.6)
CALCIUM SPEC-SCNC: 8.9 MG/DL (ref 8.2–9.6)
CALCIUM SPEC-SCNC: 9 MG/DL (ref 8.2–9.6)
CALCIUM SPEC-SCNC: 9 MG/DL (ref 8.2–9.6)
CALCIUM SPEC-SCNC: 9.1 MG/DL (ref 8.2–9.6)
CALCIUM SPEC-SCNC: 9.1 MG/DL (ref 8.2–9.6)
CALCIUM SPEC-SCNC: 9.2 MG/DL (ref 8.2–9.6)
CHLORIDE SERPL-SCNC: 102 MMOL/L (ref 98–107)
CHLORIDE SERPL-SCNC: 103 MMOL/L (ref 98–107)
CHLORIDE SERPL-SCNC: 103 MMOL/L (ref 98–107)
CHLORIDE SERPL-SCNC: 104 MMOL/L (ref 98–107)
CHLORIDE SERPL-SCNC: 105 MMOL/L (ref 98–107)
CHLORIDE SERPL-SCNC: 106 MMOL/L (ref 98–107)
CHLORIDE SERPL-SCNC: 107 MMOL/L (ref 98–107)
CHLORIDE SERPL-SCNC: 108 MMOL/L (ref 98–107)
CHLORIDE SERPL-SCNC: 111 MMOL/L (ref 98–107)
CHLORIDE SERPL-SCNC: 113 MMOL/L (ref 98–107)
CHLORIDE SERPL-SCNC: 94 MMOL/L (ref 98–107)
CHLORIDE SERPL-SCNC: 95 MMOL/L (ref 98–107)
CHLORIDE SERPL-SCNC: 95 MMOL/L (ref 98–107)
CHLORIDE SERPL-SCNC: 96 MMOL/L (ref 98–107)
CHLORIDE SERPL-SCNC: 97 MMOL/L (ref 98–107)
CHLORIDE SERPL-SCNC: 97 MMOL/L (ref 98–107)
CK SERPL-CCNC: 12 U/L (ref 20–200)
CLARITY UR: ABNORMAL
CLARITY UR: CLEAR
CLARITY UR: CLEAR
CO2 BLDA-SCNC: 32.9 MMOL/L (ref 22–33)
CO2 SERPL-SCNC: 22 MMOL/L (ref 22–29)
CO2 SERPL-SCNC: 23 MMOL/L (ref 22–29)
CO2 SERPL-SCNC: 23 MMOL/L (ref 22–29)
CO2 SERPL-SCNC: 24 MMOL/L (ref 22–29)
CO2 SERPL-SCNC: 24 MMOL/L (ref 22–29)
CO2 SERPL-SCNC: 24.7 MMOL/L (ref 22–29)
CO2 SERPL-SCNC: 25 MMOL/L (ref 22–29)
CO2 SERPL-SCNC: 26 MMOL/L (ref 22–29)
CO2 SERPL-SCNC: 26 MMOL/L (ref 22–29)
CO2 SERPL-SCNC: 27 MMOL/L (ref 22–29)
CO2 SERPL-SCNC: 28 MMOL/L (ref 22–29)
CO2 SERPL-SCNC: 29 MMOL/L (ref 22–29)
COARSE GRAN CASTS URNS QL MICRO: ABNORMAL /LPF
COHGB MFR BLD: 0.6 % (ref 0–2)
COLOR UR: YELLOW
CORTIS SERPL-MCNC: 8.67 MCG/DL
CREAT SERPL-MCNC: 0.8 MG/DL (ref 0.76–1.27)
CREAT SERPL-MCNC: 0.94 MG/DL (ref 0.76–1.27)
CREAT SERPL-MCNC: 0.95 MG/DL (ref 0.76–1.27)
CREAT SERPL-MCNC: 0.97 MG/DL (ref 0.76–1.27)
CREAT SERPL-MCNC: 1.03 MG/DL (ref 0.76–1.27)
CREAT SERPL-MCNC: 1.16 MG/DL (ref 0.76–1.27)
CREAT SERPL-MCNC: 1.18 MG/DL (ref 0.76–1.27)
CREAT SERPL-MCNC: 1.19 MG/DL (ref 0.76–1.27)
CREAT SERPL-MCNC: 1.19 MG/DL (ref 0.76–1.27)
CREAT SERPL-MCNC: 1.26 MG/DL (ref 0.76–1.27)
CREAT SERPL-MCNC: 1.26 MG/DL (ref 0.76–1.27)
CREAT SERPL-MCNC: 1.3 MG/DL (ref 0.76–1.27)
CREAT SERPL-MCNC: 1.38 MG/DL (ref 0.76–1.27)
CREAT SERPL-MCNC: 1.42 MG/DL (ref 0.76–1.27)
CREAT SERPL-MCNC: 1.44 MG/DL (ref 0.76–1.27)
CREAT SERPL-MCNC: 1.49 MG/DL (ref 0.76–1.27)
CRP SERPL-MCNC: 20.21 MG/DL (ref 0–0.5)
CYTOLOGIST CVX/VAG CYTO: NORMAL
D-LACTATE SERPL-SCNC: 0.5 MMOL/L (ref 0.5–2)
D-LACTATE SERPL-SCNC: 1 MMOL/L (ref 0.5–2)
DEPRECATED RDW RBC AUTO: 45.8 FL (ref 37–54)
DEPRECATED RDW RBC AUTO: 46.5 FL (ref 37–54)
DEPRECATED RDW RBC AUTO: 49.7 FL (ref 37–54)
DEPRECATED RDW RBC AUTO: 50 FL (ref 37–54)
DEPRECATED RDW RBC AUTO: 51.9 FL (ref 37–54)
DEPRECATED RDW RBC AUTO: 52 FL (ref 37–54)
DEPRECATED RDW RBC AUTO: 56.1 FL (ref 37–54)
DEPRECATED RDW RBC AUTO: 60.7 FL (ref 37–54)
DEPRECATED RDW RBC AUTO: 61.2 FL (ref 37–54)
DEPRECATED RDW RBC AUTO: 62.6 FL (ref 37–54)
EGFRCR SERPLBLD CKD-EPI 2021: 44.3 ML/MIN/1.73
EGFRCR SERPLBLD CKD-EPI 2021: 46.2 ML/MIN/1.73
EGFRCR SERPLBLD CKD-EPI 2021: 46.9 ML/MIN/1.73
EGFRCR SERPLBLD CKD-EPI 2021: 48.6 ML/MIN/1.73
EGFRCR SERPLBLD CKD-EPI 2021: 52.2 ML/MIN/1.73
EGFRCR SERPLBLD CKD-EPI 2021: 54.2 ML/MIN/1.73
EGFRCR SERPLBLD CKD-EPI 2021: 54.2 ML/MIN/1.73
EGFRCR SERPLBLD CKD-EPI 2021: 58 ML/MIN/1.73
EGFRCR SERPLBLD CKD-EPI 2021: 58 ML/MIN/1.73
EGFRCR SERPLBLD CKD-EPI 2021: 58.6 ML/MIN/1.73
EGFRCR SERPLBLD CKD-EPI 2021: 59.8 ML/MIN/1.73
EGFRCR SERPLBLD CKD-EPI 2021: 69 ML/MIN/1.73
EGFRCR SERPLBLD CKD-EPI 2021: 74.2 ML/MIN/1.73
EGFRCR SERPLBLD CKD-EPI 2021: 76 ML/MIN/1.73
EGFRCR SERPLBLD CKD-EPI 2021: 77 ML/MIN/1.73
EGFRCR SERPLBLD CKD-EPI 2021: 84.1 ML/MIN/1.73
EOSINOPHIL # BLD AUTO: 0.01 10*3/MM3 (ref 0–0.4)
EOSINOPHIL # BLD AUTO: 0.02 10*3/MM3 (ref 0–0.4)
EOSINOPHIL # BLD AUTO: 0.02 10*3/MM3 (ref 0–0.4)
EOSINOPHIL # BLD AUTO: 0.04 10*3/MM3 (ref 0–0.4)
EOSINOPHIL # BLD MANUAL: 0 10*3/MM3 (ref 0–0.4)
EOSINOPHIL NFR BLD AUTO: 0.1 % (ref 0.3–6.2)
EOSINOPHIL NFR BLD AUTO: 0.2 % (ref 0.3–6.2)
EOSINOPHIL NFR BLD AUTO: 0.2 % (ref 0.3–6.2)
EOSINOPHIL NFR BLD AUTO: 1.3 % (ref 0.3–6.2)
EOSINOPHIL NFR BLD MANUAL: 0 % (ref 0.3–6.2)
EPAP: 0
ERYTHROCYTE [DISTWIDTH] IN BLOOD BY AUTOMATED COUNT: 16.8 % (ref 12.3–15.4)
ERYTHROCYTE [DISTWIDTH] IN BLOOD BY AUTOMATED COUNT: 17.2 % (ref 12.3–15.4)
ERYTHROCYTE [DISTWIDTH] IN BLOOD BY AUTOMATED COUNT: 17.2 % (ref 12.3–15.4)
ERYTHROCYTE [DISTWIDTH] IN BLOOD BY AUTOMATED COUNT: 18.3 % (ref 12.3–15.4)
ERYTHROCYTE [DISTWIDTH] IN BLOOD BY AUTOMATED COUNT: 18.9 % (ref 12.3–15.4)
ERYTHROCYTE [DISTWIDTH] IN BLOOD BY AUTOMATED COUNT: 18.9 % (ref 12.3–15.4)
ERYTHROCYTE [DISTWIDTH] IN BLOOD BY AUTOMATED COUNT: 19 % (ref 12.3–15.4)
ERYTHROCYTE [DISTWIDTH] IN BLOOD BY AUTOMATED COUNT: 20.2 % (ref 12.3–15.4)
ERYTHROCYTE [DISTWIDTH] IN BLOOD BY AUTOMATED COUNT: 20.8 % (ref 12.3–15.4)
ERYTHROCYTE [DISTWIDTH] IN BLOOD BY AUTOMATED COUNT: 20.9 % (ref 12.3–15.4)
ERYTHROCYTE [DISTWIDTH] IN BLOOD BY AUTOMATED COUNT: 21 % (ref 12.3–15.4)
EXPIRATION DATE: ABNORMAL
FERRITIN SERPL-MCNC: 18.84 NG/ML (ref 30–400)
FLUAV SUBTYP SPEC NAA+PROBE: NOT DETECTED
FLUBV RNA ISLT QL NAA+PROBE: NOT DETECTED
FOLATE SERPL-MCNC: 8.79 NG/ML (ref 4.78–24.2)
GLOBULIN SER CALC-MCNC: 2.8 GM/DL
GLOBULIN UR ELPH-MCNC: 2.6 GM/DL
GLOBULIN UR ELPH-MCNC: 3 GM/DL
GLOBULIN UR ELPH-MCNC: 3.5 GM/DL
GLOBULIN UR ELPH-MCNC: 3.7 GM/DL
GLUCOSE BLDC GLUCOMTR-MCNC: 207 MG/DL (ref 70–130)
GLUCOSE BLDC GLUCOMTR-MCNC: 91 MG/DL (ref 70–130)
GLUCOSE SERPL-MCNC: 101 MG/DL (ref 65–99)
GLUCOSE SERPL-MCNC: 103 MG/DL (ref 65–99)
GLUCOSE SERPL-MCNC: 103 MG/DL (ref 65–99)
GLUCOSE SERPL-MCNC: 105 MG/DL (ref 65–99)
GLUCOSE SERPL-MCNC: 115 MG/DL (ref 65–99)
GLUCOSE SERPL-MCNC: 123 MG/DL (ref 65–99)
GLUCOSE SERPL-MCNC: 124 MG/DL (ref 65–99)
GLUCOSE SERPL-MCNC: 126 MG/DL (ref 65–99)
GLUCOSE SERPL-MCNC: 134 MG/DL (ref 65–99)
GLUCOSE SERPL-MCNC: 155 MG/DL (ref 65–99)
GLUCOSE SERPL-MCNC: 157 MG/DL (ref 65–99)
GLUCOSE SERPL-MCNC: 77 MG/DL (ref 65–99)
GLUCOSE SERPL-MCNC: 79 MG/DL (ref 65–99)
GLUCOSE SERPL-MCNC: 82 MG/DL (ref 65–99)
GLUCOSE SERPL-MCNC: 90 MG/DL (ref 65–99)
GLUCOSE SERPL-MCNC: 93 MG/DL (ref 65–99)
GLUCOSE UR STRIP-MCNC: NEGATIVE MG/DL
HADV DNA SPEC NAA+PROBE: NOT DETECTED
HADV DNA SPEC NAA+PROBE: NOT DETECTED
HCO3 BLDA-SCNC: 31.5 MMOL/L (ref 20–26)
HCOV 229E RNA SPEC QL NAA+PROBE: NOT DETECTED
HCOV 229E RNA SPEC QL NAA+PROBE: NOT DETECTED
HCOV HKU1 RNA SPEC QL NAA+PROBE: NOT DETECTED
HCOV HKU1 RNA SPEC QL NAA+PROBE: NOT DETECTED
HCOV NL63 RNA SPEC QL NAA+PROBE: NOT DETECTED
HCOV NL63 RNA SPEC QL NAA+PROBE: NOT DETECTED
HCOV OC43 RNA SPEC QL NAA+PROBE: NOT DETECTED
HCOV OC43 RNA SPEC QL NAA+PROBE: NOT DETECTED
HCT VFR BLD AUTO: 24.5 % (ref 37.5–51)
HCT VFR BLD AUTO: 26.2 % (ref 37.5–51)
HCT VFR BLD AUTO: 26.7 % (ref 37.5–51)
HCT VFR BLD AUTO: 27 % (ref 37.5–51)
HCT VFR BLD AUTO: 27.4 % (ref 37.5–51)
HCT VFR BLD AUTO: 27.6 % (ref 37.5–51)
HCT VFR BLD AUTO: 28.4 % (ref 37.5–51)
HCT VFR BLD AUTO: 29.8 % (ref 37.5–51)
HCT VFR BLD AUTO: 31.3 % (ref 37.5–51)
HCT VFR BLD AUTO: 32.4 % (ref 37.5–51)
HCT VFR BLD AUTO: 33.1 % (ref 37.5–51)
HCT VFR BLD CALC: 30.7 % (ref 38–51)
HGB BLD-MCNC: 10 G/DL (ref 13–17.7)
HGB BLD-MCNC: 10.1 G/DL (ref 13–17.7)
HGB BLD-MCNC: 8.1 G/DL (ref 13–17.7)
HGB BLD-MCNC: 8.5 G/DL (ref 13–17.7)
HGB BLD-MCNC: 8.7 G/DL (ref 13–17.7)
HGB BLD-MCNC: 8.7 G/DL (ref 13–17.7)
HGB BLD-MCNC: 8.8 G/DL (ref 13–17.7)
HGB BLD-MCNC: 9 G/DL (ref 13–17.7)
HGB BLD-MCNC: 9.2 G/DL (ref 13–17.7)
HGB BLD-MCNC: 9.4 G/DL (ref 13–17.7)
HGB BLD-MCNC: 9.7 G/DL (ref 13–17.7)
HGB BLDA-MCNC: 10 G/DL (ref 13.5–17.5)
HGB UR QL STRIP.AUTO: ABNORMAL
HGB UR QL STRIP.AUTO: NEGATIVE
HGB UR QL STRIP.AUTO: NEGATIVE
HMPV RNA NPH QL NAA+NON-PROBE: NOT DETECTED
HMPV RNA NPH QL NAA+NON-PROBE: NOT DETECTED
HOLD SPECIMEN: NORMAL
HPIV1 RNA ISLT QL NAA+PROBE: NOT DETECTED
HPIV1 RNA ISLT QL NAA+PROBE: NOT DETECTED
HPIV2 RNA SPEC QL NAA+PROBE: NOT DETECTED
HPIV2 RNA SPEC QL NAA+PROBE: NOT DETECTED
HPIV3 RNA NPH QL NAA+PROBE: NOT DETECTED
HPIV3 RNA NPH QL NAA+PROBE: NOT DETECTED
HPIV4 P GENE NPH QL NAA+PROBE: NOT DETECTED
HPIV4 P GENE NPH QL NAA+PROBE: NOT DETECTED
HYALINE CASTS UR QL AUTO: ABNORMAL /LPF
HYALINE CASTS UR QL AUTO: ABNORMAL /LPF
IMM GRANULOCYTES # BLD AUTO: 0.01 10*3/MM3 (ref 0–0.05)
IMM GRANULOCYTES # BLD AUTO: 0.05 10*3/MM3 (ref 0–0.05)
IMM GRANULOCYTES # BLD AUTO: 0.09 10*3/MM3 (ref 0–0.05)
IMM GRANULOCYTES # BLD AUTO: 0.11 10*3/MM3 (ref 0–0.05)
IMM GRANULOCYTES NFR BLD AUTO: 0.3 % (ref 0–0.5)
IMM GRANULOCYTES NFR BLD AUTO: 0.7 % (ref 0–0.5)
IMM GRANULOCYTES NFR BLD AUTO: 1.1 % (ref 0–0.5)
IMM GRANULOCYTES NFR BLD AUTO: 1.1 % (ref 0–0.5)
INHALED O2 CONCENTRATION: 32 %
IPAP: 0
IRON 24H UR-MRATE: 65 MCG/DL (ref 59–158)
IRON 24H UR-MRATE: 78 MCG/DL (ref 59–158)
IRON SATN MFR SERPL: 16 % (ref 20–50)
IRON SATN MFR SERPL: 40 % (ref 20–50)
KETONES UR QL STRIP: ABNORMAL
KETONES UR QL STRIP: NEGATIVE
KETONES UR QL STRIP: NEGATIVE
L PNEUMO1 AG UR QL IA: NEGATIVE
LEUKOCYTE ESTERASE UR QL STRIP.AUTO: ABNORMAL
LEUKOCYTE ESTERASE UR QL STRIP.AUTO: ABNORMAL
LEUKOCYTE ESTERASE UR QL STRIP.AUTO: NEGATIVE
LIPASE SERPL-CCNC: 21 U/L (ref 13–60)
LYMPHOCYTES # BLD AUTO: 0.24 10*3/MM3 (ref 0.7–3.1)
LYMPHOCYTES # BLD AUTO: 0.31 10*3/MM3 (ref 0.7–3.1)
LYMPHOCYTES # BLD AUTO: 0.33 10*3/MM3 (ref 0.7–3.1)
LYMPHOCYTES # BLD AUTO: 0.47 10*3/MM3 (ref 0.7–3.1)
LYMPHOCYTES # BLD MANUAL: 0.61 10*3/MM3 (ref 0.7–3.1)
LYMPHOCYTES NFR BLD AUTO: 11.1 % (ref 19.6–45.3)
LYMPHOCYTES NFR BLD AUTO: 3.1 % (ref 19.6–45.3)
LYMPHOCYTES NFR BLD AUTO: 3.2 % (ref 19.6–45.3)
LYMPHOCYTES NFR BLD AUTO: 5.8 % (ref 19.6–45.3)
LYMPHOCYTES NFR BLD MANUAL: 10 % (ref 5–12)
Lab: ABNORMAL
M PNEUMO IGG SER IA-ACNC: NOT DETECTED
M PNEUMO IGG SER IA-ACNC: NOT DETECTED
MAGNESIUM SERPL-MCNC: 1.7 MG/DL (ref 1.6–2.4)
MAGNESIUM SERPL-MCNC: 2 MG/DL (ref 1.6–2.4)
MAGNESIUM SERPL-MCNC: 2.4 MG/DL (ref 1.6–2.4)
MCH RBC QN AUTO: 24.3 PG (ref 26.6–33)
MCH RBC QN AUTO: 24.5 PG (ref 26.6–33)
MCH RBC QN AUTO: 24.7 PG (ref 26.6–33)
MCH RBC QN AUTO: 24.9 PG (ref 26.6–33)
MCH RBC QN AUTO: 25 PG (ref 26.6–33)
MCH RBC QN AUTO: 25.1 PG (ref 26.6–33)
MCH RBC QN AUTO: 25.1 PG (ref 26.6–33)
MCH RBC QN AUTO: 25.9 PG (ref 26.6–33)
MCHC RBC AUTO-ENTMCNC: 29.9 G/DL (ref 31.5–35.7)
MCHC RBC AUTO-ENTMCNC: 30.5 G/DL (ref 31.5–35.7)
MCHC RBC AUTO-ENTMCNC: 31.5 G/DL (ref 31.5–35.7)
MCHC RBC AUTO-ENTMCNC: 31.5 G/DL (ref 31.5–35.7)
MCHC RBC AUTO-ENTMCNC: 31.9 G/DL (ref 31.5–35.7)
MCHC RBC AUTO-ENTMCNC: 32.2 G/DL (ref 31.5–35.7)
MCHC RBC AUTO-ENTMCNC: 32.4 G/DL (ref 31.5–35.7)
MCHC RBC AUTO-ENTMCNC: 32.4 G/DL (ref 31.5–35.7)
MCHC RBC AUTO-ENTMCNC: 32.8 G/DL (ref 31.5–35.7)
MCHC RBC AUTO-ENTMCNC: 33 G/DL (ref 31.5–35.7)
MCHC RBC AUTO-ENTMCNC: 33.1 G/DL (ref 31.5–35.7)
MCV RBC AUTO: 75.4 FL (ref 79–97)
MCV RBC AUTO: 75.6 FL (ref 79–97)
MCV RBC AUTO: 75.9 FL (ref 79–97)
MCV RBC AUTO: 76.1 FL (ref 79–97)
MCV RBC AUTO: 76.3 FL (ref 79–97)
MCV RBC AUTO: 77 FL (ref 79–97)
MCV RBC AUTO: 77.1 FL (ref 79–97)
MCV RBC AUTO: 78.4 FL (ref 79–97)
MCV RBC AUTO: 82.1 FL (ref 79–97)
MCV RBC AUTO: 82.3 FL (ref 79–97)
MCV RBC AUTO: 83.1 FL (ref 79–97)
METHGB BLD QL: 0.7 % (ref 0–1.5)
MODALITY: ABNORMAL
MONOCYTES # BLD AUTO: 0.29 10*3/MM3 (ref 0.1–0.9)
MONOCYTES # BLD AUTO: 0.48 10*3/MM3 (ref 0.1–0.9)
MONOCYTES # BLD AUTO: 0.5 10*3/MM3 (ref 0.1–0.9)
MONOCYTES # BLD AUTO: 0.55 10*3/MM3 (ref 0.1–0.9)
MONOCYTES # BLD: 0.87 10*3/MM3 (ref 0.1–0.9)
MONOCYTES NFR BLD AUTO: 5 % (ref 5–12)
MONOCYTES NFR BLD AUTO: 5.9 % (ref 5–12)
MONOCYTES NFR BLD AUTO: 7.3 % (ref 5–12)
MONOCYTES NFR BLD AUTO: 9.7 % (ref 5–12)
MRSA DNA SPEC QL NAA+PROBE: NEGATIVE
NEUTROPHILS # BLD AUTO: 7.25 10*3/MM3 (ref 1.7–7)
NEUTROPHILS NFR BLD AUTO: 2.3 10*3/MM3 (ref 1.7–7)
NEUTROPHILS NFR BLD AUTO: 6.69 10*3/MM3 (ref 1.7–7)
NEUTROPHILS NFR BLD AUTO: 7.08 10*3/MM3 (ref 1.7–7)
NEUTROPHILS NFR BLD AUTO: 77.3 % (ref 42.7–76)
NEUTROPHILS NFR BLD AUTO: 86.6 % (ref 42.7–76)
NEUTROPHILS NFR BLD AUTO: 88.7 % (ref 42.7–76)
NEUTROPHILS NFR BLD AUTO: 9.08 10*3/MM3 (ref 1.7–7)
NEUTROPHILS NFR BLD AUTO: 90.5 % (ref 42.7–76)
NEUTROPHILS NFR BLD MANUAL: 70 % (ref 42.7–76)
NEUTS BAND NFR BLD MANUAL: 13 % (ref 0–5)
NITRITE UR QL STRIP: NEGATIVE
NITRITE UR QL STRIP: NEGATIVE
NITRITE UR QL STRIP: POSITIVE
NOTE: ABNORMAL
NRBC BLD AUTO-RTO: 0 /100 WBC (ref 0–0.2)
NRBC BLD AUTO-RTO: 0.3 /100 WBC (ref 0–0.2)
NT-PROBNP SERPL-MCNC: 106.9 PG/ML (ref 0–1800)
NT-PROBNP SERPL-MCNC: 716 PG/ML (ref 0–1800)
OSMOLALITY SERPL: 270 MOSM/KG (ref 275–295)
OSMOLALITY SERPL: 282 MOSM/KG (ref 275–295)
OSMOLALITY UR: 360 MOSM/KG (ref 300–1100)
OSMOLALITY UR: 568 MOSM/KG (ref 300–1100)
OXYHGB MFR BLDV: 95.5 % (ref 94–99)
PATH INTERP BLD-IMP: NORMAL
PAW @ PEAK INSP FLOW SETTING VENT: 0 CMH2O
PCO2 BLDA: 45.4 MM HG (ref 35–45)
PCO2 TEMP ADJ BLD: 45.4 MM HG (ref 35–48)
PH BLDA: 7.45 PH UNITS (ref 7.35–7.45)
PH UR STRIP.AUTO: 6 [PH] (ref 5–8)
PH UR STRIP.AUTO: 7.5 [PH] (ref 5–8)
PH UR STRIP.AUTO: <=5 [PH] (ref 5–8)
PH, TEMP CORRECTED: 7.45 PH UNITS
PHOSPHATE SERPL-MCNC: 2.7 MG/DL (ref 2.5–4.5)
PHOSPHATE SERPL-MCNC: 3 MG/DL (ref 2.5–4.5)
PLAT MORPH BLD: NORMAL
PLATELET # BLD AUTO: 100 10*3/MM3 (ref 140–450)
PLATELET # BLD AUTO: 120 10*3/MM3 (ref 140–450)
PLATELET # BLD AUTO: 136 10*3/MM3 (ref 140–450)
PLATELET # BLD AUTO: 150 10*3/MM3 (ref 140–450)
PLATELET # BLD AUTO: 166 10*3/MM3 (ref 140–450)
PLATELET # BLD AUTO: 172 10*3/MM3 (ref 140–450)
PLATELET # BLD AUTO: 178 10*3/MM3 (ref 140–450)
PLATELET # BLD AUTO: 179 10*3/MM3 (ref 140–450)
PLATELET # BLD AUTO: 192 10*3/MM3 (ref 140–450)
PLATELET # BLD AUTO: 206 10*3/MM3 (ref 140–450)
PLATELET # BLD AUTO: 212 10*3/MM3 (ref 140–450)
PMV BLD AUTO: 10.6 FL (ref 6–12)
PMV BLD AUTO: 10.6 FL (ref 6–12)
PMV BLD AUTO: 10.8 FL (ref 6–12)
PMV BLD AUTO: 10.9 FL (ref 6–12)
PMV BLD AUTO: 11 FL (ref 6–12)
PMV BLD AUTO: 11.2 FL (ref 6–12)
PMV BLD AUTO: 11.2 FL (ref 6–12)
PMV BLD AUTO: 11.7 FL (ref 6–12)
PMV BLD AUTO: 11.9 FL (ref 6–12)
PMV BLD AUTO: 12.3 FL (ref 6–12)
PO2 BLDA: 81.9 MM HG (ref 83–108)
PO2 TEMP ADJ BLD: 81.9 MM HG (ref 83–108)
POC CREATININE URINE: ABNORMAL
POC MICROALBUMIN URINE: ABNORMAL
POTASSIUM SERPL-SCNC: 3.8 MMOL/L (ref 3.5–5.2)
POTASSIUM SERPL-SCNC: 3.9 MMOL/L (ref 3.5–5.2)
POTASSIUM SERPL-SCNC: 4 MMOL/L (ref 3.5–5.2)
POTASSIUM SERPL-SCNC: 4 MMOL/L (ref 3.5–5.2)
POTASSIUM SERPL-SCNC: 4.1 MMOL/L (ref 3.5–5.2)
POTASSIUM SERPL-SCNC: 4.2 MMOL/L (ref 3.5–5.2)
POTASSIUM SERPL-SCNC: 4.4 MMOL/L (ref 3.5–5.2)
POTASSIUM SERPL-SCNC: 4.5 MMOL/L (ref 3.5–5.2)
POTASSIUM SERPL-SCNC: 4.8 MMOL/L (ref 3.5–5.2)
POTASSIUM SERPL-SCNC: 4.8 MMOL/L (ref 3.5–5.2)
POTASSIUM SERPL-SCNC: 4.9 MMOL/L (ref 3.5–5.2)
PROCALCITONIN SERPL-MCNC: 0.53 NG/ML (ref 0–0.25)
PROT SERPL-MCNC: 5.9 G/DL (ref 6–8.5)
PROT SERPL-MCNC: 5.9 G/DL (ref 6–8.5)
PROT SERPL-MCNC: 6 G/DL (ref 6–8.5)
PROT SERPL-MCNC: 6 G/DL (ref 6–8.5)
PROT SERPL-MCNC: 6.4 G/DL (ref 6–8.5)
PROT UR QL STRIP: ABNORMAL
PROT UR QL STRIP: NEGATIVE
PROT UR QL STRIP: NEGATIVE
QT INTERVAL: 374 MS
QT INTERVAL: 386 MS
QT INTERVAL: 400 MS
QT INTERVAL: 480 MS
QT INTERVAL: 490 MS
QT INTERVAL: 490 MS
QTC INTERVAL: 450 MS
QTC INTERVAL: 455 MS
QTC INTERVAL: 464 MS
QTC INTERVAL: 464 MS
QTC INTERVAL: 468 MS
QTC INTERVAL: 469 MS
RBC # BLD AUTO: 3.25 10*6/MM3 (ref 4.14–5.8)
RBC # BLD AUTO: 3.36 10*6/MM3 (ref 4.14–5.8)
RBC # BLD AUTO: 3.4 10*6/MM3 (ref 4.14–5.8)
RBC # BLD AUTO: 3.53 10*6/MM3 (ref 4.14–5.8)
RBC # BLD AUTO: 3.55 10*6/MM3 (ref 4.14–5.8)
RBC # BLD AUTO: 3.61 10*6/MM3 (ref 4.14–5.8)
RBC # BLD AUTO: 3.72 10*6/MM3 (ref 4.14–5.8)
RBC # BLD AUTO: 3.87 10*6/MM3 (ref 4.14–5.8)
RBC # BLD AUTO: 3.9 10*6/MM3 (ref 4.14–5.8)
RBC # BLD AUTO: 3.99 10*6/MM3 (ref 4.14–5.8)
RBC # BLD AUTO: 4.02 10*6/MM3 (ref 4.14–5.8)
RBC # UR STRIP: ABNORMAL /HPF
RBC # UR STRIP: ABNORMAL /HPF
REF LAB TEST METHOD: ABNORMAL
REF LAB TEST METHOD: ABNORMAL
RENIN PLAS-CCNC: 2.07 NG/ML/HR (ref 0.17–5.38)
RHINOVIRUS RNA SPEC NAA+PROBE: NOT DETECTED
RHINOVIRUS RNA SPEC NAA+PROBE: NOT DETECTED
RPR SER QL: NORMAL
RSV RNA NPH QL NAA+NON-PROBE: NOT DETECTED
RSV RNA NPH QL NAA+NON-PROBE: NOT DETECTED
SARS-COV-2 RNA NPH QL NAA+NON-PROBE: NOT DETECTED
SARS-COV-2 RNA NPH QL NAA+NON-PROBE: NOT DETECTED
SARS-COV-2 RNA PNL SPEC NAA+PROBE: NOT DETECTED
SODIUM SERPL-SCNC: 126 MMOL/L (ref 136–145)
SODIUM SERPL-SCNC: 126 MMOL/L (ref 136–145)
SODIUM SERPL-SCNC: 128 MMOL/L (ref 136–145)
SODIUM SERPL-SCNC: 129 MMOL/L (ref 136–145)
SODIUM SERPL-SCNC: 131 MMOL/L (ref 136–145)
SODIUM SERPL-SCNC: 132 MMOL/L (ref 136–145)
SODIUM SERPL-SCNC: 133 MMOL/L (ref 136–145)
SODIUM SERPL-SCNC: 134 MMOL/L (ref 136–145)
SODIUM SERPL-SCNC: 136 MMOL/L (ref 136–145)
SODIUM SERPL-SCNC: 138 MMOL/L (ref 136–145)
SODIUM SERPL-SCNC: 139 MMOL/L (ref 136–145)
SODIUM SERPL-SCNC: 142 MMOL/L (ref 136–145)
SODIUM SERPL-SCNC: 143 MMOL/L (ref 136–145)
SODIUM SERPL-SCNC: 144 MMOL/L (ref 136–145)
SODIUM SERPL-SCNC: 145 MMOL/L (ref 136–145)
SODIUM SERPL-SCNC: 146 MMOL/L (ref 136–145)
SODIUM UR-SCNC: 71 MMOL/L
SODIUM UR-SCNC: 88 MMOL/L
SP GR UR STRIP: 1.01 (ref 1–1.03)
SP GR UR STRIP: 1.01 (ref 1–1.03)
SP GR UR STRIP: 1.02 (ref 1–1.03)
SQUAMOUS #/AREA URNS HPF: ABNORMAL /HPF
SQUAMOUS #/AREA URNS HPF: ABNORMAL /HPF
T4 FREE SERPL-MCNC: 0.92 NG/DL (ref 0.93–1.7)
T4 FREE SERPL-MCNC: 1.03 NG/DL (ref 0.93–1.7)
TIBC SERPL-MCNC: 197 MCG/DL (ref 298–536)
TIBC SERPL-MCNC: 413 MCG/DL (ref 298–536)
TOTAL RATE: 0 BREATHS/MINUTE
TRANSFERRIN SERPL-MCNC: 132 MG/DL (ref 200–360)
TRANSFERRIN SERPL-MCNC: 277 MG/DL (ref 200–360)
TROPONIN T SERPL-MCNC: 0.02 NG/ML (ref 0–0.03)
TROPONIN T SERPL-MCNC: 0.03 NG/ML (ref 0–0.03)
TROPONIN T SERPL-MCNC: 0.04 NG/ML (ref 0–0.03)
TROPONIN T SERPL-MCNC: <0.01 NG/ML (ref 0–0.03)
TSH SERPL DL<=0.005 MIU/L-ACNC: 9.08 UIU/ML (ref 0.27–4.2)
TSH SERPL DL<=0.05 MIU/L-ACNC: 7.96 UIU/ML (ref 0.27–4.2)
URATE SERPL-MCNC: 5.8 MG/DL (ref 3.4–7)
UROBILINOGEN UR QL STRIP: ABNORMAL
UROBILINOGEN UR QL STRIP: ABNORMAL
UROBILINOGEN UR QL STRIP: NORMAL
VARIANT LYMPHS NFR BLD MANUAL: 7 % (ref 19.6–45.3)
VIT B12 BLD-MCNC: 370 PG/ML (ref 211–946)
WBC # BLD AUTO: 4.75 10*3/MM3 (ref 3.4–10.8)
WBC # UR STRIP: ABNORMAL /HPF
WBC # UR STRIP: ABNORMAL /HPF
WBC MORPH BLD: NORMAL
WBC NRBC COR # BLD: 10.03 10*3/MM3 (ref 3.4–10.8)
WBC NRBC COR # BLD: 14.44 10*3/MM3 (ref 3.4–10.8)
WBC NRBC COR # BLD: 2.98 10*3/MM3 (ref 3.4–10.8)
WBC NRBC COR # BLD: 3.65 10*3/MM3 (ref 3.4–10.8)
WBC NRBC COR # BLD: 7.54 10*3/MM3 (ref 3.4–10.8)
WBC NRBC COR # BLD: 7.64 10*3/MM3 (ref 3.4–10.8)
WBC NRBC COR # BLD: 8.17 10*3/MM3 (ref 3.4–10.8)
WBC NRBC COR # BLD: 8.69 10*3/MM3 (ref 3.4–10.8)
WBC NRBC COR # BLD: 8.74 10*3/MM3 (ref 3.4–10.8)
WBC NRBC COR # BLD: 9.76 10*3/MM3 (ref 3.4–10.8)
WHOLE BLOOD HOLD COAG: NORMAL
WHOLE BLOOD HOLD COAG: NORMAL
WHOLE BLOOD HOLD SPECIMEN: NORMAL
WHOLE BLOOD HOLD SPECIMEN: NORMAL

## 2022-01-01 PROCEDURE — 25010000002 FUROSEMIDE PER 20 MG: Performed by: NURSE PRACTITIONER

## 2022-01-01 PROCEDURE — 94799 UNLISTED PULMONARY SVC/PX: CPT

## 2022-01-01 PROCEDURE — 80069 RENAL FUNCTION PANEL: CPT | Performed by: INTERNAL MEDICINE

## 2022-01-01 PROCEDURE — 84145 PROCALCITONIN (PCT): CPT | Performed by: STUDENT IN AN ORGANIZED HEALTH CARE EDUCATION/TRAINING PROGRAM

## 2022-01-01 PROCEDURE — 83540 ASSAY OF IRON: CPT | Performed by: INTERNAL MEDICINE

## 2022-01-01 PROCEDURE — 96361 HYDRATE IV INFUSION ADD-ON: CPT

## 2022-01-01 PROCEDURE — 83880 ASSAY OF NATRIURETIC PEPTIDE: CPT | Performed by: EMERGENCY MEDICINE

## 2022-01-01 PROCEDURE — 25010000002 KETOROLAC TROMETHAMINE PER 15 MG: Performed by: NURSE PRACTITIONER

## 2022-01-01 PROCEDURE — 92610 EVALUATE SWALLOWING FUNCTION: CPT

## 2022-01-01 PROCEDURE — 25010000002 HEPARIN (PORCINE) PER 1000 UNITS: Performed by: INTERNAL MEDICINE

## 2022-01-01 PROCEDURE — 83605 ASSAY OF LACTIC ACID: CPT | Performed by: EMERGENCY MEDICINE

## 2022-01-01 PROCEDURE — 25010000002 HALOPERIDOL LACTATE PER 5 MG: Performed by: NURSE PRACTITIONER

## 2022-01-01 PROCEDURE — 25010000002 HYDROCORTISONE SODIUM SUCCINATE 100 MG RECONSTITUTED SOLUTION: Performed by: INTERNAL MEDICINE

## 2022-01-01 PROCEDURE — 80048 BASIC METABOLIC PNL TOTAL CA: CPT

## 2022-01-01 PROCEDURE — 92526 ORAL FUNCTION THERAPY: CPT

## 2022-01-01 PROCEDURE — 87636 SARSCOV2 & INF A&B AMP PRB: CPT | Performed by: INTERNAL MEDICINE

## 2022-01-01 PROCEDURE — 99225 PR SBSQ OBSERVATION CARE/DAY 25 MINUTES: CPT | Performed by: STUDENT IN AN ORGANIZED HEALTH CARE EDUCATION/TRAINING PROGRAM

## 2022-01-01 PROCEDURE — 25010000002 MIDAZOLAM 50 MG/10ML SOLUTION: Performed by: NURSE PRACTITIONER

## 2022-01-01 PROCEDURE — 83930 ASSAY OF BLOOD OSMOLALITY: CPT | Performed by: NURSE PRACTITIONER

## 2022-01-01 PROCEDURE — 99233 SBSQ HOSP IP/OBS HIGH 50: CPT | Performed by: FAMILY MEDICINE

## 2022-01-01 PROCEDURE — 97110 THERAPEUTIC EXERCISES: CPT

## 2022-01-01 PROCEDURE — G0378 HOSPITAL OBSERVATION PER HR: HCPCS

## 2022-01-01 PROCEDURE — 83930 ASSAY OF BLOOD OSMOLALITY: CPT | Performed by: INTERNAL MEDICINE

## 2022-01-01 PROCEDURE — 84484 ASSAY OF TROPONIN QUANT: CPT | Performed by: PHYSICIAN ASSISTANT

## 2022-01-01 PROCEDURE — 25010000002 CEFEPIME PER 500 MG

## 2022-01-01 PROCEDURE — 94761 N-INVAS EAR/PLS OXIMETRY MLT: CPT

## 2022-01-01 PROCEDURE — 99285 EMERGENCY DEPT VISIT HI MDM: CPT

## 2022-01-01 PROCEDURE — 83605 ASSAY OF LACTIC ACID: CPT | Performed by: STUDENT IN AN ORGANIZED HEALTH CARE EDUCATION/TRAINING PROGRAM

## 2022-01-01 PROCEDURE — 85025 COMPLETE CBC W/AUTO DIFF WBC: CPT | Performed by: EMERGENCY MEDICINE

## 2022-01-01 PROCEDURE — 84439 ASSAY OF FREE THYROXINE: CPT | Performed by: INTERNAL MEDICINE

## 2022-01-01 PROCEDURE — 93005 ELECTROCARDIOGRAM TRACING: CPT | Performed by: STUDENT IN AN ORGANIZED HEALTH CARE EDUCATION/TRAINING PROGRAM

## 2022-01-01 PROCEDURE — 99214 OFFICE O/P EST MOD 30 MIN: CPT | Performed by: INTERNAL MEDICINE

## 2022-01-01 PROCEDURE — 97530 THERAPEUTIC ACTIVITIES: CPT

## 2022-01-01 PROCEDURE — 96376 TX/PRO/DX INJ SAME DRUG ADON: CPT

## 2022-01-01 PROCEDURE — 99232 SBSQ HOSP IP/OBS MODERATE 35: CPT | Performed by: PSYCHIATRY & NEUROLOGY

## 2022-01-01 PROCEDURE — 96372 THER/PROPH/DIAG INJ SC/IM: CPT

## 2022-01-01 PROCEDURE — 99232 SBSQ HOSP IP/OBS MODERATE 35: CPT | Performed by: INTERNAL MEDICINE

## 2022-01-01 PROCEDURE — 84550 ASSAY OF BLOOD/URIC ACID: CPT | Performed by: INTERNAL MEDICINE

## 2022-01-01 PROCEDURE — 82550 ASSAY OF CK (CPK): CPT | Performed by: STUDENT IN AN ORGANIZED HEALTH CARE EDUCATION/TRAINING PROGRAM

## 2022-01-01 PROCEDURE — 25010000002 MIDAZOLAM PER 1 MG: Performed by: NURSE PRACTITIONER

## 2022-01-01 PROCEDURE — 83735 ASSAY OF MAGNESIUM: CPT | Performed by: STUDENT IN AN ORGANIZED HEALTH CARE EDUCATION/TRAINING PROGRAM

## 2022-01-01 PROCEDURE — 25010000002 METHYLPREDNISOLONE PER 40 MG: Performed by: STUDENT IN AN ORGANIZED HEALTH CARE EDUCATION/TRAINING PROGRAM

## 2022-01-01 PROCEDURE — 82533 TOTAL CORTISOL: CPT | Performed by: INTERNAL MEDICINE

## 2022-01-01 PROCEDURE — 80048 BASIC METABOLIC PNL TOTAL CA: CPT | Performed by: NURSE PRACTITIONER

## 2022-01-01 PROCEDURE — 25010000002 HYDROMORPHONE PER 4 MG: Performed by: STUDENT IN AN ORGANIZED HEALTH CARE EDUCATION/TRAINING PROGRAM

## 2022-01-01 PROCEDURE — 25010000002 HYDROMORPHONE 1 MG/ML SOLUTION: Performed by: NURSE PRACTITIONER

## 2022-01-01 PROCEDURE — 97535 SELF CARE MNGMENT TRAINING: CPT

## 2022-01-01 PROCEDURE — 63710000001 PREDNISONE PER 5 MG: Performed by: INTERNAL MEDICINE

## 2022-01-01 PROCEDURE — 99223 1ST HOSP IP/OBS HIGH 75: CPT | Performed by: INTERNAL MEDICINE

## 2022-01-01 PROCEDURE — 84466 ASSAY OF TRANSFERRIN: CPT | Performed by: INTERNAL MEDICINE

## 2022-01-01 PROCEDURE — 93005 ELECTROCARDIOGRAM TRACING: CPT | Performed by: NURSE PRACTITIONER

## 2022-01-01 PROCEDURE — 84295 ASSAY OF SERUM SODIUM: CPT | Performed by: INTERNAL MEDICINE

## 2022-01-01 PROCEDURE — 25010000002 HEPARIN (PORCINE) PER 1000 UNITS: Performed by: PHYSICIAN ASSISTANT

## 2022-01-01 PROCEDURE — 80048 BASIC METABOLIC PNL TOTAL CA: CPT | Performed by: INTERNAL MEDICINE

## 2022-01-01 PROCEDURE — 84244 ASSAY OF RENIN: CPT | Performed by: INTERNAL MEDICINE

## 2022-01-01 PROCEDURE — 93010 ELECTROCARDIOGRAM REPORT: CPT | Performed by: INTERNAL MEDICINE

## 2022-01-01 PROCEDURE — 97166 OT EVAL MOD COMPLEX 45 MIN: CPT

## 2022-01-01 PROCEDURE — 85025 COMPLETE CBC W/AUTO DIFF WBC: CPT | Performed by: INTERNAL MEDICINE

## 2022-01-01 PROCEDURE — 99232 SBSQ HOSP IP/OBS MODERATE 35: CPT | Performed by: FAMILY MEDICINE

## 2022-01-01 PROCEDURE — 99217 PR OBSERVATION CARE DISCHARGE MANAGEMENT: CPT | Performed by: STUDENT IN AN ORGANIZED HEALTH CARE EDUCATION/TRAINING PROGRAM

## 2022-01-01 PROCEDURE — 83735 ASSAY OF MAGNESIUM: CPT | Performed by: INTERNAL MEDICINE

## 2022-01-01 PROCEDURE — 85007 BL SMEAR W/DIFF WBC COUNT: CPT | Performed by: STUDENT IN AN ORGANIZED HEALTH CARE EDUCATION/TRAINING PROGRAM

## 2022-01-01 PROCEDURE — 84300 ASSAY OF URINE SODIUM: CPT | Performed by: NURSE PRACTITIONER

## 2022-01-01 PROCEDURE — 83735 ASSAY OF MAGNESIUM: CPT | Performed by: EMERGENCY MEDICINE

## 2022-01-01 PROCEDURE — 87040 BLOOD CULTURE FOR BACTERIA: CPT | Performed by: STUDENT IN AN ORGANIZED HEALTH CARE EDUCATION/TRAINING PROGRAM

## 2022-01-01 PROCEDURE — 83935 ASSAY OF URINE OSMOLALITY: CPT | Performed by: NURSE PRACTITIONER

## 2022-01-01 PROCEDURE — 80053 COMPREHEN METABOLIC PANEL: CPT | Performed by: PHYSICIAN ASSISTANT

## 2022-01-01 PROCEDURE — 71045 X-RAY EXAM CHEST 1 VIEW: CPT

## 2022-01-01 PROCEDURE — 94640 AIRWAY INHALATION TREATMENT: CPT

## 2022-01-01 PROCEDURE — 25010000002 HEPARIN (PORCINE) PER 1000 UNITS: Performed by: NURSE PRACTITIONER

## 2022-01-01 PROCEDURE — 99225 PR SBSQ OBSERVATION CARE/DAY 25 MINUTES: CPT | Performed by: INTERNAL MEDICINE

## 2022-01-01 PROCEDURE — 99222 1ST HOSP IP/OBS MODERATE 55: CPT | Performed by: PSYCHIATRY & NEUROLOGY

## 2022-01-01 PROCEDURE — 94664 DEMO&/EVAL PT USE INHALER: CPT

## 2022-01-01 PROCEDURE — 96375 TX/PRO/DX INJ NEW DRUG ADDON: CPT

## 2022-01-01 PROCEDURE — 70450 CT HEAD/BRAIN W/O DYE: CPT

## 2022-01-01 PROCEDURE — 70551 MRI BRAIN STEM W/O DYE: CPT

## 2022-01-01 PROCEDURE — 25010000002 METHYLPREDNISOLONE PER 125 MG: Performed by: INTERNAL MEDICINE

## 2022-01-01 PROCEDURE — 25010000002 HYDROCORTISONE SODIUM SUCCINATE 100 MG RECONSTITUTED SOLUTION: Performed by: NURSE PRACTITIONER

## 2022-01-01 PROCEDURE — 99239 HOSP IP/OBS DSCHRG MGMT >30: CPT | Performed by: INTERNAL MEDICINE

## 2022-01-01 PROCEDURE — 25010000002 HYDROMORPHONE PER 4 MG: Performed by: NURSE PRACTITIONER

## 2022-01-01 PROCEDURE — 82044 UR ALBUMIN SEMIQUANTITATIVE: CPT | Performed by: INTERNAL MEDICINE

## 2022-01-01 PROCEDURE — 97116 GAIT TRAINING THERAPY: CPT

## 2022-01-01 PROCEDURE — 85027 COMPLETE CBC AUTOMATED: CPT | Performed by: INTERNAL MEDICINE

## 2022-01-01 PROCEDURE — 99238 HOSP IP/OBS DSCHRG MGMT 30/<: CPT | Performed by: INTERNAL MEDICINE

## 2022-01-01 PROCEDURE — 97162 PT EVAL MOD COMPLEX 30 MIN: CPT

## 2022-01-01 PROCEDURE — 84443 ASSAY THYROID STIM HORMONE: CPT | Performed by: EMERGENCY MEDICINE

## 2022-01-01 PROCEDURE — 36415 COLL VENOUS BLD VENIPUNCTURE: CPT

## 2022-01-01 PROCEDURE — 25010000002 HYDROMORPHONE HCL PF 500 MG/50ML SOLUTION: Performed by: NURSE PRACTITIONER

## 2022-01-01 PROCEDURE — 25010000002 MORPHINE PER 10 MG: Performed by: FAMILY MEDICINE

## 2022-01-01 PROCEDURE — 82805 BLOOD GASES W/O2 SATURATION: CPT

## 2022-01-01 PROCEDURE — 25010000002 LORAZEPAM PER 2 MG: Performed by: INTERNAL MEDICINE

## 2022-01-01 PROCEDURE — 0202U NFCT DS 22 TRGT SARS-COV-2: CPT | Performed by: EMERGENCY MEDICINE

## 2022-01-01 PROCEDURE — 81003 URINALYSIS AUTO W/O SCOPE: CPT | Performed by: EMERGENCY MEDICINE

## 2022-01-01 PROCEDURE — 83540 ASSAY OF IRON: CPT | Performed by: NURSE PRACTITIONER

## 2022-01-01 PROCEDURE — 87040 BLOOD CULTURE FOR BACTERIA: CPT | Performed by: EMERGENCY MEDICINE

## 2022-01-01 PROCEDURE — 93005 ELECTROCARDIOGRAM TRACING: CPT | Performed by: PHYSICIAN ASSISTANT

## 2022-01-01 PROCEDURE — 80053 COMPREHEN METABOLIC PANEL: CPT | Performed by: EMERGENCY MEDICINE

## 2022-01-01 PROCEDURE — 99220 PR INITIAL OBSERVATION CARE/DAY 70 MINUTES: CPT | Performed by: INTERNAL MEDICINE

## 2022-01-01 PROCEDURE — 25010000002 CEFTRIAXONE PER 250 MG: Performed by: INTERNAL MEDICINE

## 2022-01-01 PROCEDURE — 36600 WITHDRAWAL OF ARTERIAL BLOOD: CPT

## 2022-01-01 PROCEDURE — 82088 ASSAY OF ALDOSTERONE: CPT | Performed by: INTERNAL MEDICINE

## 2022-01-01 PROCEDURE — 85027 COMPLETE CBC AUTOMATED: CPT | Performed by: NURSE PRACTITIONER

## 2022-01-01 PROCEDURE — P9047 ALBUMIN (HUMAN), 25%, 50ML: HCPCS | Performed by: INTERNAL MEDICINE

## 2022-01-01 PROCEDURE — 96366 THER/PROPH/DIAG IV INF ADDON: CPT

## 2022-01-01 PROCEDURE — 81001 URINALYSIS AUTO W/SCOPE: CPT | Performed by: EMERGENCY MEDICINE

## 2022-01-01 PROCEDURE — 25010000002 CEFTRIAXONE PER 250 MG: Performed by: EMERGENCY MEDICINE

## 2022-01-01 PROCEDURE — 97162 PT EVAL MOD COMPLEX 30 MIN: CPT | Performed by: PHYSICAL THERAPIST

## 2022-01-01 PROCEDURE — 95819 EEG AWAKE AND ASLEEP: CPT

## 2022-01-01 PROCEDURE — 83935 ASSAY OF URINE OSMOLALITY: CPT | Performed by: INTERNAL MEDICINE

## 2022-01-01 PROCEDURE — 87449 NOS EACH ORGANISM AG IA: CPT | Performed by: PHYSICIAN ASSISTANT

## 2022-01-01 PROCEDURE — 99233 SBSQ HOSP IP/OBS HIGH 50: CPT | Performed by: INTERNAL MEDICINE

## 2022-01-01 PROCEDURE — 0202U NFCT DS 22 TRGT SARS-COV-2: CPT | Performed by: STUDENT IN AN ORGANIZED HEALTH CARE EDUCATION/TRAINING PROGRAM

## 2022-01-01 PROCEDURE — 85060 BLOOD SMEAR INTERPRETATION: CPT | Performed by: NURSE PRACTITIONER

## 2022-01-01 PROCEDURE — 25010000002 CEFEPIME PER 500 MG: Performed by: STUDENT IN AN ORGANIZED HEALTH CARE EDUCATION/TRAINING PROGRAM

## 2022-01-01 PROCEDURE — 96365 THER/PROPH/DIAG IV INF INIT: CPT

## 2022-01-01 PROCEDURE — 84300 ASSAY OF URINE SODIUM: CPT | Performed by: INTERNAL MEDICINE

## 2022-01-01 PROCEDURE — 83880 ASSAY OF NATRIURETIC PEPTIDE: CPT | Performed by: STUDENT IN AN ORGANIZED HEALTH CARE EDUCATION/TRAINING PROGRAM

## 2022-01-01 PROCEDURE — 82375 ASSAY CARBOXYHB QUANT: CPT

## 2022-01-01 PROCEDURE — 85027 COMPLETE CBC AUTOMATED: CPT | Performed by: PHYSICIAN ASSISTANT

## 2022-01-01 PROCEDURE — 84466 ASSAY OF TRANSFERRIN: CPT | Performed by: NURSE PRACTITIONER

## 2022-01-01 PROCEDURE — 83690 ASSAY OF LIPASE: CPT | Performed by: STUDENT IN AN ORGANIZED HEALTH CARE EDUCATION/TRAINING PROGRAM

## 2022-01-01 PROCEDURE — 84484 ASSAY OF TROPONIN QUANT: CPT | Performed by: EMERGENCY MEDICINE

## 2022-01-01 PROCEDURE — 87641 MR-STAPH DNA AMP PROBE: CPT | Performed by: PHYSICIAN ASSISTANT

## 2022-01-01 PROCEDURE — 86140 C-REACTIVE PROTEIN: CPT | Performed by: STUDENT IN AN ORGANIZED HEALTH CARE EDUCATION/TRAINING PROGRAM

## 2022-01-01 PROCEDURE — 85025 COMPLETE CBC W/AUTO DIFF WBC: CPT | Performed by: STUDENT IN AN ORGANIZED HEALTH CARE EDUCATION/TRAINING PROGRAM

## 2022-01-01 PROCEDURE — 84100 ASSAY OF PHOSPHORUS: CPT | Performed by: STUDENT IN AN ORGANIZED HEALTH CARE EDUCATION/TRAINING PROGRAM

## 2022-01-01 PROCEDURE — 0 MAGNESIUM SULFATE 4 GM/100ML SOLUTION: Performed by: NURSE PRACTITIONER

## 2022-01-01 PROCEDURE — 99231 SBSQ HOSP IP/OBS SF/LOW 25: CPT | Performed by: FAMILY MEDICINE

## 2022-01-01 PROCEDURE — 87086 URINE CULTURE/COLONY COUNT: CPT | Performed by: INTERNAL MEDICINE

## 2022-01-01 PROCEDURE — 87086 URINE CULTURE/COLONY COUNT: CPT | Performed by: EMERGENCY MEDICINE

## 2022-01-01 PROCEDURE — 82746 ASSAY OF FOLIC ACID SERUM: CPT | Performed by: PSYCHIATRY & NEUROLOGY

## 2022-01-01 PROCEDURE — P9612 CATHETERIZE FOR URINE SPEC: HCPCS

## 2022-01-01 PROCEDURE — 93005 ELECTROCARDIOGRAM TRACING: CPT | Performed by: EMERGENCY MEDICINE

## 2022-01-01 PROCEDURE — 86592 SYPHILIS TEST NON-TREP QUAL: CPT | Performed by: PSYCHIATRY & NEUROLOGY

## 2022-01-01 PROCEDURE — 63710000001 PREDNISONE PER 5 MG: Performed by: NURSE PRACTITIONER

## 2022-01-01 PROCEDURE — 25010000002 VANCOMYCIN: Performed by: STUDENT IN AN ORGANIZED HEALTH CARE EDUCATION/TRAINING PROGRAM

## 2022-01-01 PROCEDURE — 25010000002 ALBUMIN HUMAN 25% PER 50 ML: Performed by: INTERNAL MEDICINE

## 2022-01-01 PROCEDURE — 99215 OFFICE O/P EST HI 40 MIN: CPT | Performed by: INTERNAL MEDICINE

## 2022-01-01 PROCEDURE — 84484 ASSAY OF TROPONIN QUANT: CPT | Performed by: STUDENT IN AN ORGANIZED HEALTH CARE EDUCATION/TRAINING PROGRAM

## 2022-01-01 PROCEDURE — 82962 GLUCOSE BLOOD TEST: CPT

## 2022-01-01 PROCEDURE — 80053 COMPREHEN METABOLIC PANEL: CPT | Performed by: STUDENT IN AN ORGANIZED HEALTH CARE EDUCATION/TRAINING PROGRAM

## 2022-01-01 PROCEDURE — 82728 ASSAY OF FERRITIN: CPT | Performed by: NURSE PRACTITIONER

## 2022-01-01 PROCEDURE — 82140 ASSAY OF AMMONIA: CPT | Performed by: EMERGENCY MEDICINE

## 2022-01-01 PROCEDURE — 87186 SC STD MICRODIL/AGAR DIL: CPT | Performed by: EMERGENCY MEDICINE

## 2022-01-01 PROCEDURE — 84484 ASSAY OF TROPONIN QUANT: CPT | Performed by: INTERNAL MEDICINE

## 2022-01-01 PROCEDURE — 82607 VITAMIN B-12: CPT | Performed by: PSYCHIATRY & NEUROLOGY

## 2022-01-01 PROCEDURE — 99222 1ST HOSP IP/OBS MODERATE 55: CPT | Performed by: INTERNAL MEDICINE

## 2022-01-01 PROCEDURE — 83050 HGB METHEMOGLOBIN QUAN: CPT

## 2022-01-01 PROCEDURE — 99284 EMERGENCY DEPT VISIT MOD MDM: CPT

## 2022-01-01 PROCEDURE — 93005 ELECTROCARDIOGRAM TRACING: CPT | Performed by: INTERNAL MEDICINE

## 2022-01-01 PROCEDURE — 81001 URINALYSIS AUTO W/SCOPE: CPT | Performed by: INTERNAL MEDICINE

## 2022-01-01 PROCEDURE — 99232 SBSQ HOSP IP/OBS MODERATE 35: CPT | Performed by: NURSE PRACTITIONER

## 2022-01-01 RX ORDER — TAMSULOSIN HYDROCHLORIDE 0.4 MG/1
0.4 CAPSULE ORAL DAILY
Status: DISCONTINUED | OUTPATIENT
Start: 2022-01-01 | End: 2022-01-01 | Stop reason: SDUPTHER

## 2022-01-01 RX ORDER — LEVOTHYROXINE SODIUM 137 UG/1
137 TABLET ORAL
Status: DISCONTINUED | OUTPATIENT
Start: 2022-01-01 | End: 2022-01-01 | Stop reason: HOSPADM

## 2022-01-01 RX ORDER — ACETAMINOPHEN 160 MG/5ML
650 SOLUTION ORAL EVERY 4 HOURS PRN
Status: DISCONTINUED | OUTPATIENT
Start: 2022-01-01 | End: 2022-01-01 | Stop reason: HOSPADM

## 2022-01-01 RX ORDER — LORAZEPAM 2 MG/ML
0.25 INJECTION INTRAMUSCULAR EVERY 4 HOURS PRN
Status: DISCONTINUED | OUTPATIENT
Start: 2022-01-01 | End: 2022-01-01 | Stop reason: HOSPADM

## 2022-01-01 RX ORDER — HYDROCODONE BITARTRATE AND ACETAMINOPHEN 5; 325 MG/1; MG/1
1 TABLET ORAL EVERY 6 HOURS PRN
Status: DISCONTINUED | OUTPATIENT
Start: 2022-01-01 | End: 2022-01-01

## 2022-01-01 RX ORDER — BISACODYL 5 MG/1
10 TABLET, DELAYED RELEASE ORAL DAILY PRN
Status: DISCONTINUED | OUTPATIENT
Start: 2022-01-01 | End: 2022-01-01 | Stop reason: HOSPADM

## 2022-01-01 RX ORDER — POLYETHYLENE GLYCOL 3350 17 G/17G
17 POWDER, FOR SOLUTION ORAL DAILY PRN
Status: DISCONTINUED | OUTPATIENT
Start: 2022-01-01 | End: 2022-01-01 | Stop reason: HOSPADM

## 2022-01-01 RX ORDER — SODIUM CHLORIDE 0.9 % (FLUSH) 0.9 %
10 SYRINGE (ML) INJECTION AS NEEDED
Status: DISCONTINUED | OUTPATIENT
Start: 2022-01-01 | End: 2022-01-01 | Stop reason: HOSPADM

## 2022-01-01 RX ORDER — FUROSEMIDE 10 MG/ML
20 INJECTION INTRAMUSCULAR; INTRAVENOUS EVERY 6 HOURS PRN
Status: DISCONTINUED | OUTPATIENT
Start: 2022-01-01 | End: 2022-01-01 | Stop reason: HOSPADM

## 2022-01-01 RX ORDER — FUROSEMIDE 40 MG/1
40 TABLET ORAL DAILY
Status: DISCONTINUED | OUTPATIENT
Start: 2022-01-01 | End: 2022-01-01 | Stop reason: HOSPADM

## 2022-01-01 RX ORDER — FUROSEMIDE 10 MG/ML
20 INJECTION INTRAMUSCULAR; INTRAVENOUS EVERY 8 HOURS
Status: DISCONTINUED | OUTPATIENT
Start: 2022-01-01 | End: 2022-01-01

## 2022-01-01 RX ORDER — DOCUSATE SODIUM 100 MG/1
100 CAPSULE, LIQUID FILLED ORAL 2 TIMES DAILY
Status: DISCONTINUED | OUTPATIENT
Start: 2022-01-01 | End: 2022-01-01 | Stop reason: HOSPADM

## 2022-01-01 RX ORDER — GLYCOPYRROLATE 0.2 MG/ML
0.2 INJECTION INTRAMUSCULAR; INTRAVENOUS EVERY 6 HOURS PRN
Status: DISCONTINUED | OUTPATIENT
Start: 2022-01-01 | End: 2022-01-01 | Stop reason: HOSPADM

## 2022-01-01 RX ORDER — TOLVAPTAN 15 MG/1
7.5 TABLET ORAL ONCE
Status: COMPLETED | OUTPATIENT
Start: 2022-01-01 | End: 2022-01-01

## 2022-01-01 RX ORDER — BISACODYL 10 MG
10 SUPPOSITORY, RECTAL RECTAL DAILY PRN
Status: DISCONTINUED | OUTPATIENT
Start: 2022-01-01 | End: 2022-01-01 | Stop reason: HOSPADM

## 2022-01-01 RX ORDER — QUETIAPINE FUMARATE 25 MG/1
25 TABLET, FILM COATED ORAL NIGHTLY PRN
Status: DISCONTINUED | OUTPATIENT
Start: 2022-01-01 | End: 2022-01-01 | Stop reason: HOSPADM

## 2022-01-01 RX ORDER — LORAZEPAM 0.5 MG/1
0.5 TABLET ORAL EVERY 12 HOURS SCHEDULED
Qty: 12 TABLET | Refills: 0 | Status: SHIPPED | OUTPATIENT
Start: 2022-01-01 | End: 2022-01-01

## 2022-01-01 RX ORDER — IPRATROPIUM BROMIDE AND ALBUTEROL SULFATE 2.5; .5 MG/3ML; MG/3ML
3 SOLUTION RESPIRATORY (INHALATION) ONCE
Status: COMPLETED | OUTPATIENT
Start: 2022-01-01 | End: 2022-01-01

## 2022-01-01 RX ORDER — BISACODYL 5 MG/1
5 TABLET, DELAYED RELEASE ORAL DAILY PRN
Status: DISCONTINUED | OUTPATIENT
Start: 2022-01-01 | End: 2022-01-01 | Stop reason: HOSPADM

## 2022-01-01 RX ORDER — ALBUTEROL SULFATE 2.5 MG/3ML
2.5 SOLUTION RESPIRATORY (INHALATION) EVERY 4 HOURS PRN
Status: DISCONTINUED | OUTPATIENT
Start: 2022-01-01 | End: 2022-01-01 | Stop reason: HOSPADM

## 2022-01-01 RX ORDER — TRAMADOL HYDROCHLORIDE 50 MG/1
50 TABLET ORAL EVERY 6 HOURS PRN
Status: DISCONTINUED | OUTPATIENT
Start: 2022-01-01 | End: 2022-01-01 | Stop reason: HOSPADM

## 2022-01-01 RX ORDER — PREDNISONE 10 MG/1
10 TABLET ORAL 2 TIMES DAILY WITH MEALS
Status: COMPLETED | OUTPATIENT
Start: 2022-01-01 | End: 2022-01-01

## 2022-01-01 RX ORDER — PREDNISONE 1 MG/1
5 TABLET ORAL 2 TIMES DAILY WITH MEALS
Status: DISCONTINUED | OUTPATIENT
Start: 2022-01-01 | End: 2022-01-01 | Stop reason: HOSPADM

## 2022-01-01 RX ORDER — LORAZEPAM 0.5 MG/1
0.25 TABLET ORAL EVERY 12 HOURS SCHEDULED
Status: DISCONTINUED | OUTPATIENT
Start: 2022-01-01 | End: 2022-01-01

## 2022-01-01 RX ORDER — HYDRALAZINE HYDROCHLORIDE 50 MG/1
25 TABLET, FILM COATED ORAL EVERY 12 HOURS SCHEDULED
Status: DISCONTINUED | OUTPATIENT
Start: 2022-01-01 | End: 2022-01-01 | Stop reason: HOSPADM

## 2022-01-01 RX ORDER — ONDANSETRON 2 MG/ML
4 INJECTION INTRAMUSCULAR; INTRAVENOUS EVERY 6 HOURS PRN
Status: DISCONTINUED | OUTPATIENT
Start: 2022-01-01 | End: 2022-01-01 | Stop reason: HOSPADM

## 2022-01-01 RX ORDER — LORAZEPAM 2 MG/ML
1 INJECTION INTRAMUSCULAR ONCE
Status: COMPLETED | OUTPATIENT
Start: 2022-01-01 | End: 2022-01-01

## 2022-01-01 RX ORDER — VANCOMYCIN HYDROCHLORIDE 1 G/200ML
11.6 INJECTION, SOLUTION INTRAVENOUS
Status: DISCONTINUED | OUTPATIENT
Start: 2022-01-01 | End: 2022-01-01

## 2022-01-01 RX ORDER — HYDROMORPHONE HYDROCHLORIDE 1 MG/ML
0.25 INJECTION, SOLUTION INTRAMUSCULAR; INTRAVENOUS; SUBCUTANEOUS ONCE
Status: COMPLETED | OUTPATIENT
Start: 2022-01-01 | End: 2022-01-01

## 2022-01-01 RX ORDER — TAMSULOSIN HYDROCHLORIDE 0.4 MG/1
0.4 CAPSULE ORAL DAILY
Status: DISCONTINUED | OUTPATIENT
Start: 2022-01-01 | End: 2022-01-01 | Stop reason: HOSPADM

## 2022-01-01 RX ORDER — LEVOTHYROXINE SODIUM 137 UG/1
137 TABLET ORAL
Start: 2022-01-01

## 2022-01-01 RX ORDER — CEFUROXIME AXETIL 250 MG/1
500 TABLET ORAL EVERY 12 HOURS SCHEDULED
Status: DISCONTINUED | OUTPATIENT
Start: 2022-01-01 | End: 2022-01-01 | Stop reason: HOSPADM

## 2022-01-01 RX ORDER — POLYVINYL ALCOHOL 14 MG/ML
2 SOLUTION/ DROPS OPHTHALMIC 2 TIMES DAILY
Status: DISCONTINUED | OUTPATIENT
Start: 2022-01-01 | End: 2022-01-01

## 2022-01-01 RX ORDER — DEXTROSE MONOHYDRATE 50 MG/ML
50 INJECTION, SOLUTION INTRAVENOUS CONTINUOUS
Status: ACTIVE | OUTPATIENT
Start: 2022-01-01 | End: 2022-01-01

## 2022-01-01 RX ORDER — AMLODIPINE BESYLATE 2.5 MG/1
2.5 TABLET ORAL DAILY
Status: DISCONTINUED | OUTPATIENT
Start: 2022-01-01 | End: 2022-01-01

## 2022-01-01 RX ORDER — OXYCODONE HYDROCHLORIDE AND ACETAMINOPHEN 5; 325 MG/1; MG/1
1 TABLET ORAL EVERY 6 HOURS PRN
Qty: 12 TABLET | Refills: 0 | Status: SHIPPED | OUTPATIENT
Start: 2022-01-01 | End: 2022-01-01

## 2022-01-01 RX ORDER — LOPERAMIDE HYDROCHLORIDE 2 MG/1
2 CAPSULE ORAL 4 TIMES DAILY PRN
Status: DISCONTINUED | OUTPATIENT
Start: 2022-01-01 | End: 2022-01-01 | Stop reason: HOSPADM

## 2022-01-01 RX ORDER — TAMSULOSIN HYDROCHLORIDE 0.4 MG/1
0.4 CAPSULE ORAL NIGHTLY
Status: DISCONTINUED | OUTPATIENT
Start: 2022-01-01 | End: 2022-01-01 | Stop reason: HOSPADM

## 2022-01-01 RX ORDER — IPRATROPIUM BROMIDE AND ALBUTEROL SULFATE 2.5; .5 MG/3ML; MG/3ML
3 SOLUTION RESPIRATORY (INHALATION) EVERY 4 HOURS PRN
Status: DISCONTINUED | OUTPATIENT
Start: 2022-01-01 | End: 2022-01-01 | Stop reason: HOSPADM

## 2022-01-01 RX ORDER — BUDESONIDE AND FORMOTEROL FUMARATE DIHYDRATE 80; 4.5 UG/1; UG/1
2 AEROSOL RESPIRATORY (INHALATION)
Status: DISCONTINUED | OUTPATIENT
Start: 2022-01-01 | End: 2022-01-01 | Stop reason: HOSPADM

## 2022-01-01 RX ORDER — METHYLPREDNISOLONE SODIUM SUCCINATE 40 MG/ML
40 INJECTION, POWDER, LYOPHILIZED, FOR SOLUTION INTRAMUSCULAR; INTRAVENOUS DAILY
Status: DISCONTINUED | OUTPATIENT
Start: 2022-01-01 | End: 2022-01-01

## 2022-01-01 RX ORDER — CHOLECALCIFEROL (VITAMIN D3) 125 MCG
5 CAPSULE ORAL NIGHTLY PRN
Start: 2022-01-01

## 2022-01-01 RX ORDER — CHOLECALCIFEROL (VITAMIN D3) 125 MCG
5 CAPSULE ORAL NIGHTLY PRN
Status: DISCONTINUED | OUTPATIENT
Start: 2022-01-01 | End: 2022-01-01 | Stop reason: HOSPADM

## 2022-01-01 RX ORDER — ASPIRIN 81 MG/1
324 TABLET, CHEWABLE ORAL ONCE
Status: DISCONTINUED | OUTPATIENT
Start: 2022-01-01 | End: 2022-01-01 | Stop reason: HOSPADM

## 2022-01-01 RX ORDER — CHOLECALCIFEROL (VITAMIN D3) 125 MCG
5 CAPSULE ORAL NIGHTLY PRN
Status: DISCONTINUED | OUTPATIENT
Start: 2022-01-01 | End: 2022-01-01

## 2022-01-01 RX ORDER — L.ACID,PARA/B.BIFIDUM/S.THERM 8B CELL
1 CAPSULE ORAL DAILY
Status: DISCONTINUED | OUTPATIENT
Start: 2022-01-01 | End: 2022-01-01 | Stop reason: HOSPADM

## 2022-01-01 RX ORDER — OXYBUTYNIN CHLORIDE 10 MG/1
10 TABLET, EXTENDED RELEASE ORAL DAILY
Qty: 90 TABLET | Refills: 2 | Status: SHIPPED | OUTPATIENT
Start: 2022-01-01 | End: 2022-01-01 | Stop reason: HOSPADM

## 2022-01-01 RX ORDER — ACETAMINOPHEN 325 MG/1
650 TABLET ORAL EVERY 4 HOURS PRN
Status: DISCONTINUED | OUTPATIENT
Start: 2022-01-01 | End: 2022-01-01 | Stop reason: HOSPADM

## 2022-01-01 RX ORDER — QUETIAPINE FUMARATE 25 MG/1
25 TABLET, FILM COATED ORAL NIGHTLY PRN
Qty: 12 TABLET | Refills: 0 | Status: SHIPPED | OUTPATIENT
Start: 2022-01-01

## 2022-01-01 RX ORDER — PREDNISONE 10 MG/1
10 TABLET ORAL 2 TIMES DAILY WITH MEALS
Status: DISCONTINUED | OUTPATIENT
Start: 2022-01-01 | End: 2022-01-01 | Stop reason: HOSPADM

## 2022-01-01 RX ORDER — MIDAZOLAM HYDROCHLORIDE 1 MG/ML
1 INJECTION INTRAMUSCULAR; INTRAVENOUS EVERY 4 HOURS
Status: DISCONTINUED | OUTPATIENT
Start: 2022-01-01 | End: 2022-01-01

## 2022-01-01 RX ORDER — CITALOPRAM 20 MG/1
20 TABLET ORAL DAILY
Qty: 90 TABLET | Refills: 1 | Status: SHIPPED | OUTPATIENT
Start: 2022-01-01 | End: 2022-01-01 | Stop reason: HOSPADM

## 2022-01-01 RX ORDER — HEPARIN SODIUM 5000 [USP'U]/ML
5000 INJECTION, SOLUTION INTRAVENOUS; SUBCUTANEOUS EVERY 12 HOURS SCHEDULED
Status: DISCONTINUED | OUTPATIENT
Start: 2022-01-01 | End: 2022-01-01

## 2022-01-01 RX ORDER — FUROSEMIDE 10 MG/ML
20 INJECTION INTRAMUSCULAR; INTRAVENOUS EVERY 6 HOURS
Status: DISCONTINUED | OUTPATIENT
Start: 2022-01-01 | End: 2022-01-01 | Stop reason: HOSPADM

## 2022-01-01 RX ORDER — SODIUM CHLORIDE 0.9 % (FLUSH) 0.9 %
10 SYRINGE (ML) INJECTION EVERY 12 HOURS SCHEDULED
Status: DISCONTINUED | OUTPATIENT
Start: 2022-01-01 | End: 2022-01-01 | Stop reason: HOSPADM

## 2022-01-01 RX ORDER — HYDROCODONE BITARTRATE AND ACETAMINOPHEN 5; 325 MG/1; MG/1
1 TABLET ORAL EVERY 8 HOURS
Status: DISCONTINUED | OUTPATIENT
Start: 2022-01-01 | End: 2022-01-01

## 2022-01-01 RX ORDER — NALOXONE HCL 0.4 MG/ML
0.1 VIAL (ML) INJECTION
Status: DISCONTINUED | OUTPATIENT
Start: 2022-01-01 | End: 2022-01-01

## 2022-01-01 RX ORDER — CITALOPRAM 20 MG/1
20 TABLET ORAL DAILY
Status: DISCONTINUED | OUTPATIENT
Start: 2022-01-01 | End: 2022-01-01

## 2022-01-01 RX ORDER — MIDAZOLAM HYDROCHLORIDE 1 MG/ML
1 INJECTION INTRAMUSCULAR; INTRAVENOUS EVERY 6 HOURS
Status: DISCONTINUED | OUTPATIENT
Start: 2022-01-01 | End: 2022-01-01

## 2022-01-01 RX ORDER — MAGNESIUM SULFATE HEPTAHYDRATE 40 MG/ML
2 INJECTION, SOLUTION INTRAVENOUS AS NEEDED
Status: DISCONTINUED | OUTPATIENT
Start: 2022-01-01 | End: 2022-01-01 | Stop reason: HOSPADM

## 2022-01-01 RX ORDER — QUETIAPINE FUMARATE 25 MG/1
25 TABLET, FILM COATED ORAL ONCE
Status: COMPLETED | OUTPATIENT
Start: 2022-01-01 | End: 2022-01-01

## 2022-01-01 RX ORDER — AMOXICILLIN 250 MG
2 CAPSULE ORAL 2 TIMES DAILY
Status: DISCONTINUED | OUTPATIENT
Start: 2022-01-01 | End: 2022-01-01 | Stop reason: HOSPADM

## 2022-01-01 RX ORDER — AZELASTINE 1 MG/ML
2 SPRAY, METERED NASAL 2 TIMES DAILY
Qty: 30 ML | Refills: 4 | Status: SHIPPED | OUTPATIENT
Start: 2022-01-01

## 2022-01-01 RX ORDER — AZELASTINE 1 MG/ML
2 SPRAY, METERED NASAL 2 TIMES DAILY
Status: DISCONTINUED | OUTPATIENT
Start: 2022-01-01 | End: 2022-01-01 | Stop reason: HOSPADM

## 2022-01-01 RX ORDER — FUROSEMIDE 10 MG/ML
20 INJECTION INTRAMUSCULAR; INTRAVENOUS ONCE
Status: COMPLETED | OUTPATIENT
Start: 2022-01-01 | End: 2022-01-01

## 2022-01-01 RX ORDER — METRONIDAZOLE 500 MG/100ML
500 INJECTION, SOLUTION INTRAVENOUS ONCE
Status: COMPLETED | OUTPATIENT
Start: 2022-01-01 | End: 2022-01-01

## 2022-01-01 RX ORDER — BISACODYL 10 MG
10 SUPPOSITORY, RECTAL RECTAL NIGHTLY
Status: DISCONTINUED | OUTPATIENT
Start: 2022-01-01 | End: 2022-01-01

## 2022-01-01 RX ORDER — PREDNISONE 1 MG/1
2.5 TABLET ORAL 2 TIMES DAILY WITH MEALS
Status: DISCONTINUED | OUTPATIENT
Start: 2022-01-01 | End: 2022-01-01 | Stop reason: HOSPADM

## 2022-01-01 RX ORDER — SODIUM CHLORIDE 1000 MG
1 TABLET, SOLUBLE MISCELLANEOUS ONCE
Status: DISCONTINUED | OUTPATIENT
Start: 2022-01-01 | End: 2022-01-01

## 2022-01-01 RX ORDER — HALOPERIDOL 5 MG/ML
1 INJECTION INTRAMUSCULAR EVERY 4 HOURS PRN
Status: DISCONTINUED | OUTPATIENT
Start: 2022-01-01 | End: 2022-01-01

## 2022-01-01 RX ORDER — POLYVINYL ALCOHOL 14 MG/ML
2 SOLUTION/ DROPS OPHTHALMIC 2 TIMES DAILY
Status: DISCONTINUED | OUTPATIENT
Start: 2022-01-01 | End: 2022-01-01 | Stop reason: HOSPADM

## 2022-01-01 RX ORDER — HALOPERIDOL 5 MG/ML
1 INJECTION INTRAMUSCULAR 3 TIMES DAILY
Status: DISCONTINUED | OUTPATIENT
Start: 2022-01-01 | End: 2022-01-01

## 2022-01-01 RX ORDER — MIDAZOLAM HYDROCHLORIDE 1 MG/ML
1 INJECTION INTRAMUSCULAR; INTRAVENOUS EVERY 4 HOURS PRN
Status: DISCONTINUED | OUTPATIENT
Start: 2022-01-01 | End: 2022-01-01 | Stop reason: HOSPADM

## 2022-01-01 RX ORDER — LORAZEPAM 0.5 MG/1
0.5 TABLET ORAL EVERY 12 HOURS SCHEDULED
Status: DISCONTINUED | OUTPATIENT
Start: 2022-01-01 | End: 2022-01-01

## 2022-01-01 RX ORDER — SODIUM CHLORIDE 9 MG/ML
75 INJECTION, SOLUTION INTRAVENOUS CONTINUOUS
Status: DISCONTINUED | OUTPATIENT
Start: 2022-01-01 | End: 2022-01-01

## 2022-01-01 RX ORDER — DIPHENHYDRAMINE HYDROCHLORIDE 50 MG/ML
50 INJECTION INTRAMUSCULAR; INTRAVENOUS 3 TIMES DAILY
Status: DISCONTINUED | OUTPATIENT
Start: 2022-01-01 | End: 2022-01-01

## 2022-01-01 RX ORDER — LOPERAMIDE HYDROCHLORIDE 2 MG/1
2 CAPSULE ORAL 4 TIMES DAILY PRN
Start: 2022-01-01

## 2022-01-01 RX ORDER — SCOLOPAMINE TRANSDERMAL SYSTEM 1 MG/1
1 PATCH, EXTENDED RELEASE TRANSDERMAL
Status: DISCONTINUED | OUTPATIENT
Start: 2022-01-01 | End: 2022-01-01 | Stop reason: HOSPADM

## 2022-01-01 RX ORDER — PREDNISONE 1 MG/1
TABLET ORAL
Qty: 21 TABLET | Refills: 0
Start: 2022-01-01 | End: 2022-01-01

## 2022-01-01 RX ORDER — METHYLPREDNISOLONE SODIUM SUCCINATE 125 MG/2ML
125 INJECTION, POWDER, LYOPHILIZED, FOR SOLUTION INTRAMUSCULAR; INTRAVENOUS ONCE
Status: COMPLETED | OUTPATIENT
Start: 2022-01-01 | End: 2022-01-01

## 2022-01-01 RX ORDER — KETOROLAC TROMETHAMINE 15 MG/ML
15 INJECTION, SOLUTION INTRAMUSCULAR; INTRAVENOUS EVERY 6 HOURS PRN
Status: DISPENSED | OUTPATIENT
Start: 2022-01-01 | End: 2022-01-01

## 2022-01-01 RX ORDER — LORAZEPAM 0.5 MG/1
0.25 TABLET ORAL EVERY 12 HOURS SCHEDULED
Qty: 3 TABLET | Refills: 0 | Status: SHIPPED | OUTPATIENT
Start: 2022-01-01 | End: 2022-01-01 | Stop reason: HOSPADM

## 2022-01-01 RX ORDER — HYDRALAZINE HYDROCHLORIDE 25 MG/1
25 TABLET, FILM COATED ORAL EVERY 12 HOURS SCHEDULED
Qty: 60 TABLET | Refills: 0 | Status: SHIPPED | OUTPATIENT
Start: 2022-01-01

## 2022-01-01 RX ORDER — OXYBUTYNIN CHLORIDE 5 MG/1
10 TABLET, EXTENDED RELEASE ORAL DAILY
Status: DISCONTINUED | OUTPATIENT
Start: 2022-01-01 | End: 2022-01-01 | Stop reason: HOSPADM

## 2022-01-01 RX ORDER — LORAZEPAM 0.5 MG/1
0.5 TABLET ORAL EVERY 12 HOURS SCHEDULED
Status: DISCONTINUED | OUTPATIENT
Start: 2022-01-01 | End: 2022-01-01 | Stop reason: HOSPADM

## 2022-01-01 RX ORDER — IPRATROPIUM BROMIDE AND ALBUTEROL SULFATE 2.5; .5 MG/3ML; MG/3ML
3 SOLUTION RESPIRATORY (INHALATION)
Status: DISCONTINUED | OUTPATIENT
Start: 2022-01-01 | End: 2022-01-01 | Stop reason: HOSPADM

## 2022-01-01 RX ORDER — LORAZEPAM 2 MG/ML
0.5 INJECTION INTRAMUSCULAR EVERY 4 HOURS PRN
Status: DISCONTINUED | OUTPATIENT
Start: 2022-01-01 | End: 2022-01-01

## 2022-01-01 RX ORDER — DIPHENHYDRAMINE HYDROCHLORIDE AND LIDOCAINE HYDROCHLORIDE AND ALUMINUM HYDROXIDE AND MAGNESIUM HYDRO
5 KIT EVERY 4 HOURS
Status: DISCONTINUED | OUTPATIENT
Start: 2022-01-01 | End: 2022-01-01

## 2022-01-01 RX ORDER — OXYCODONE HYDROCHLORIDE AND ACETAMINOPHEN 5; 325 MG/1; MG/1
1 TABLET ORAL EVERY 6 HOURS PRN
Status: DISCONTINUED | OUTPATIENT
Start: 2022-01-01 | End: 2022-01-01 | Stop reason: HOSPADM

## 2022-01-01 RX ORDER — ACETAMINOPHEN 650 MG/1
650 SUPPOSITORY RECTAL EVERY 4 HOURS PRN
Status: DISCONTINUED | OUTPATIENT
Start: 2022-01-01 | End: 2022-01-01 | Stop reason: HOSPADM

## 2022-01-01 RX ORDER — ALBUMIN (HUMAN) 12.5 G/50ML
12.5 SOLUTION INTRAVENOUS ONCE
Status: COMPLETED | OUTPATIENT
Start: 2022-01-01 | End: 2022-01-01

## 2022-01-01 RX ORDER — NALOXONE HCL 0.4 MG/ML
0.1 VIAL (ML) INJECTION
Status: DISCONTINUED | OUTPATIENT
Start: 2022-01-01 | End: 2022-01-01 | Stop reason: HOSPADM

## 2022-01-01 RX ORDER — LORAZEPAM 1 MG/1
TABLET ORAL
Qty: 90 TABLET | Refills: 2 | Status: SHIPPED | OUTPATIENT
Start: 2022-01-01 | End: 2022-01-01 | Stop reason: HOSPADM

## 2022-01-01 RX ORDER — HALOPERIDOL 5 MG/ML
0.5 INJECTION INTRAMUSCULAR ONCE
Status: COMPLETED | OUTPATIENT
Start: 2022-01-01 | End: 2022-01-01

## 2022-01-01 RX ORDER — HALOPERIDOL 5 MG/ML
5 INJECTION INTRAMUSCULAR EVERY 4 HOURS PRN
Status: DISCONTINUED | OUTPATIENT
Start: 2022-01-01 | End: 2022-01-01 | Stop reason: HOSPADM

## 2022-01-01 RX ORDER — HYDROMORPHONE HYDROCHLORIDE 1 MG/ML
0.5 INJECTION, SOLUTION INTRAMUSCULAR; INTRAVENOUS; SUBCUTANEOUS
Status: DISCONTINUED | OUTPATIENT
Start: 2022-01-01 | End: 2022-01-01

## 2022-01-01 RX ORDER — LEVOTHYROXINE SODIUM 0.15 MG/1
150 TABLET ORAL
Status: DISCONTINUED | OUTPATIENT
Start: 2022-01-01 | End: 2022-01-01

## 2022-01-01 RX ORDER — NICOTINE POLACRILEX 4 MG
15 LOZENGE BUCCAL
Status: DISCONTINUED | OUTPATIENT
Start: 2022-01-01 | End: 2022-01-01 | Stop reason: HOSPADM

## 2022-01-01 RX ORDER — QUETIAPINE FUMARATE 25 MG/1
25 TABLET, FILM COATED ORAL DAILY PRN
Status: DISCONTINUED | OUTPATIENT
Start: 2022-01-01 | End: 2022-01-01 | Stop reason: HOSPADM

## 2022-01-01 RX ORDER — HEPARIN SODIUM 5000 [USP'U]/ML
5000 INJECTION, SOLUTION INTRAVENOUS; SUBCUTANEOUS EVERY 8 HOURS SCHEDULED
Status: DISCONTINUED | OUTPATIENT
Start: 2022-01-01 | End: 2022-01-01 | Stop reason: HOSPADM

## 2022-01-01 RX ORDER — PREDNISONE 10 MG/1
10 TABLET ORAL EVERY 12 HOURS SCHEDULED
Status: DISCONTINUED | OUTPATIENT
Start: 2022-01-01 | End: 2022-01-01

## 2022-01-01 RX ORDER — FERROUS SULFATE 325(65) MG
325 TABLET ORAL
Status: DISCONTINUED | OUTPATIENT
Start: 2022-01-01 | End: 2022-01-01 | Stop reason: HOSPADM

## 2022-01-01 RX ORDER — HYDROMORPHONE HYDROCHLORIDE 1 MG/ML
0.5 INJECTION, SOLUTION INTRAMUSCULAR; INTRAVENOUS; SUBCUTANEOUS EVERY 6 HOURS
Status: DISCONTINUED | OUTPATIENT
Start: 2022-01-01 | End: 2022-01-01

## 2022-01-01 RX ORDER — HYDROMORPHONE HYDROCHLORIDE 1 MG/ML
0.5 INJECTION, SOLUTION INTRAMUSCULAR; INTRAVENOUS; SUBCUTANEOUS EVERY 4 HOURS
Status: DISCONTINUED | OUTPATIENT
Start: 2022-01-01 | End: 2022-01-01

## 2022-01-01 RX ORDER — BISACODYL 10 MG
10 SUPPOSITORY, RECTAL RECTAL DAILY
Status: DISCONTINUED | OUTPATIENT
Start: 2022-01-01 | End: 2022-01-01

## 2022-01-01 RX ORDER — HEPARIN SODIUM 5000 [USP'U]/ML
5000 INJECTION, SOLUTION INTRAVENOUS; SUBCUTANEOUS EVERY 12 HOURS SCHEDULED
Status: DISCONTINUED | OUTPATIENT
Start: 2022-01-01 | End: 2022-01-01 | Stop reason: HOSPADM

## 2022-01-01 RX ORDER — LORAZEPAM 0.5 MG/1
0.25 TABLET ORAL EVERY 12 HOURS SCHEDULED
Status: DISCONTINUED | OUTPATIENT
Start: 2022-01-01 | End: 2022-01-01 | Stop reason: HOSPADM

## 2022-01-01 RX ORDER — MORPHINE SULFATE 20 MG/ML
5 SOLUTION ORAL
Status: DISCONTINUED | OUTPATIENT
Start: 2022-01-01 | End: 2022-01-01

## 2022-01-01 RX ORDER — METHYLPREDNISOLONE SODIUM SUCCINATE 125 MG/2ML
60 INJECTION, POWDER, LYOPHILIZED, FOR SOLUTION INTRAMUSCULAR; INTRAVENOUS EVERY 8 HOURS
Status: DISCONTINUED | OUTPATIENT
Start: 2022-01-01 | End: 2022-01-01

## 2022-01-01 RX ORDER — HYDROMORPHONE HYDROCHLORIDE 1 MG/ML
0.25 INJECTION, SOLUTION INTRAMUSCULAR; INTRAVENOUS; SUBCUTANEOUS EVERY 6 HOURS
Status: DISCONTINUED | OUTPATIENT
Start: 2022-01-01 | End: 2022-01-01

## 2022-01-01 RX ORDER — MAGNESIUM SULFATE HEPTAHYDRATE 40 MG/ML
4 INJECTION, SOLUTION INTRAVENOUS AS NEEDED
Status: DISCONTINUED | OUTPATIENT
Start: 2022-01-01 | End: 2022-01-01 | Stop reason: HOSPADM

## 2022-01-01 RX ORDER — CEFUROXIME AXETIL 500 MG/1
500 TABLET ORAL EVERY 12 HOURS SCHEDULED
Qty: 10 TABLET | Refills: 0 | Status: SHIPPED | OUTPATIENT
Start: 2022-01-01 | End: 2022-01-01

## 2022-01-01 RX ORDER — FUROSEMIDE 40 MG/1
40 TABLET ORAL DAILY
Start: 2022-01-01 | End: 2022-01-01 | Stop reason: HOSPADM

## 2022-01-01 RX ORDER — DEXTROSE MONOHYDRATE 25 G/50ML
25 INJECTION, SOLUTION INTRAVENOUS
Status: DISCONTINUED | OUTPATIENT
Start: 2022-01-01 | End: 2022-01-01 | Stop reason: HOSPADM

## 2022-01-01 RX ORDER — CHOLECALCIFEROL (VITAMIN D3) 125 MCG
5 CAPSULE ORAL NIGHTLY
Status: DISCONTINUED | OUTPATIENT
Start: 2022-01-01 | End: 2022-01-01 | Stop reason: HOSPADM

## 2022-01-01 RX ORDER — KETOROLAC TROMETHAMINE 15 MG/ML
15 INJECTION, SOLUTION INTRAMUSCULAR; INTRAVENOUS EVERY 6 HOURS PRN
Status: DISCONTINUED | OUTPATIENT
Start: 2022-01-01 | End: 2022-01-01 | Stop reason: HOSPADM

## 2022-01-01 RX ORDER — IPRATROPIUM BROMIDE AND ALBUTEROL SULFATE 2.5; .5 MG/3ML; MG/3ML
3 SOLUTION RESPIRATORY (INHALATION) EVERY 4 HOURS PRN
Qty: 360 ML | Refills: 4 | Status: SHIPPED | OUTPATIENT
Start: 2022-01-01

## 2022-01-01 RX ORDER — PHENAZOPYRIDINE HYDROCHLORIDE 100 MG/1
200 TABLET, FILM COATED ORAL
Status: DISCONTINUED | OUTPATIENT
Start: 2022-01-01 | End: 2022-01-01 | Stop reason: HOSPADM

## 2022-01-01 RX ORDER — HYDRALAZINE HYDROCHLORIDE 25 MG/1
25 TABLET, FILM COATED ORAL EVERY 12 HOURS SCHEDULED
Status: DISCONTINUED | OUTPATIENT
Start: 2022-01-01 | End: 2022-01-01 | Stop reason: HOSPADM

## 2022-01-01 RX ORDER — SODIUM CHLORIDE 9 MG/ML
75 INJECTION, SOLUTION INTRAVENOUS CONTINUOUS
Status: ACTIVE | OUTPATIENT
Start: 2022-01-01 | End: 2022-01-01

## 2022-01-01 RX ORDER — POLYVINYL ALCOHOL 14 MG/ML
2 SOLUTION/ DROPS OPHTHALMIC
Status: DISCONTINUED | OUTPATIENT
Start: 2022-01-01 | End: 2022-01-01 | Stop reason: HOSPADM

## 2022-01-01 RX ORDER — MORPHINE SULFATE 2 MG/ML
1 INJECTION, SOLUTION INTRAMUSCULAR; INTRAVENOUS
Status: DISCONTINUED | OUTPATIENT
Start: 2022-01-01 | End: 2022-01-01

## 2022-01-01 RX ORDER — BUDESONIDE AND FORMOTEROL FUMARATE DIHYDRATE 80; 4.5 UG/1; UG/1
2 AEROSOL RESPIRATORY (INHALATION)
Refills: 5 | Status: CANCELLED | OUTPATIENT
Start: 2022-01-01

## 2022-01-01 RX ORDER — SODIUM CHLORIDE, SODIUM LACTATE, POTASSIUM CHLORIDE, CALCIUM CHLORIDE 600; 310; 30; 20 MG/100ML; MG/100ML; MG/100ML; MG/100ML
50 INJECTION, SOLUTION INTRAVENOUS CONTINUOUS
Status: ACTIVE | OUTPATIENT
Start: 2022-01-01 | End: 2022-01-01

## 2022-01-01 RX ORDER — LORAZEPAM 0.5 MG/1
TABLET ORAL
Qty: 90 TABLET | Refills: 2 | Status: SHIPPED | OUTPATIENT
Start: 2022-01-01 | End: 2022-01-01 | Stop reason: HOSPADM

## 2022-01-01 RX ORDER — AMMONIUM LACTATE 12 G/100G
LOTION TOPICAL
Qty: 500 G | Refills: 3 | Status: SHIPPED | OUTPATIENT
Start: 2022-01-01

## 2022-01-01 RX ORDER — TAMSULOSIN HYDROCHLORIDE 0.4 MG/1
1 CAPSULE ORAL DAILY
Qty: 90 CAPSULE | Refills: 2 | Status: SHIPPED | OUTPATIENT
Start: 2022-01-01

## 2022-01-01 RX ORDER — HALOPERIDOL 5 MG/ML
2 INJECTION INTRAMUSCULAR EVERY 6 HOURS
Status: DISCONTINUED | OUTPATIENT
Start: 2022-01-01 | End: 2022-01-01 | Stop reason: HOSPADM

## 2022-01-01 RX ORDER — ACETAMINOPHEN 325 MG/1
650 TABLET ORAL EVERY 6 HOURS PRN
Start: 2022-01-01

## 2022-01-01 RX ORDER — DIPHENHYDRAMINE HYDROCHLORIDE 50 MG/ML
50 INJECTION INTRAMUSCULAR; INTRAVENOUS EVERY 6 HOURS PRN
Status: DISCONTINUED | OUTPATIENT
Start: 2022-01-01 | End: 2022-01-01 | Stop reason: HOSPADM

## 2022-01-01 RX ADMIN — HEPARIN SODIUM 5000 UNITS: 5000 INJECTION INTRAVENOUS; SUBCUTANEOUS at 20:51

## 2022-01-01 RX ADMIN — HYDROCORTISONE SODIUM SUCCINATE 50 MG: 100 INJECTION, POWDER, FOR SOLUTION INTRAMUSCULAR; INTRAVENOUS at 20:58

## 2022-01-01 RX ADMIN — OXYBUTYNIN CHLORIDE 10 MG: 5 TABLET, EXTENDED RELEASE ORAL at 23:19

## 2022-01-01 RX ADMIN — HYDROMORPHONE HYDROCHLORIDE: 10 INJECTION INTRAMUSCULAR; INTRAVENOUS; SUBCUTANEOUS at 03:40

## 2022-01-01 RX ADMIN — Medication 10 ML: at 19:46

## 2022-01-01 RX ADMIN — Medication 10 ML: at 22:00

## 2022-01-01 RX ADMIN — HYDROMORPHONE HYDROCHLORIDE 0.25 MG: 1 INJECTION, SOLUTION INTRAMUSCULAR; INTRAVENOUS; SUBCUTANEOUS at 02:56

## 2022-01-01 RX ADMIN — AZELASTINE HYDROCHLORIDE 2 SPRAY: 137 SPRAY, METERED NASAL at 22:00

## 2022-01-01 RX ADMIN — FUROSEMIDE 20 MG: 10 INJECTION INTRAMUSCULAR; INTRAVENOUS at 11:36

## 2022-01-01 RX ADMIN — TAMSULOSIN HYDROCHLORIDE 0.4 MG: 0.4 CAPSULE ORAL at 20:26

## 2022-01-01 RX ADMIN — TAMSULOSIN HYDROCHLORIDE 0.4 MG: 0.4 CAPSULE ORAL at 19:45

## 2022-01-01 RX ADMIN — HYDROCORTISONE SODIUM SUCCINATE 50 MG: 100 INJECTION, POWDER, FOR SOLUTION INTRAMUSCULAR; INTRAVENOUS at 10:25

## 2022-01-01 RX ADMIN — HYDROMORPHONE HYDROCHLORIDE: 10 INJECTION INTRAMUSCULAR; INTRAVENOUS; SUBCUTANEOUS at 17:16

## 2022-01-01 RX ADMIN — HEPARIN SODIUM 5000 UNITS: 5000 INJECTION INTRAVENOUS; SUBCUTANEOUS at 21:40

## 2022-01-01 RX ADMIN — POLYVINYL ALCOHOL 2 DROP: 14 SOLUTION/ DROPS OPHTHALMIC at 07:49

## 2022-01-01 RX ADMIN — LEVOTHYROXINE SODIUM 137 MCG: 25 TABLET ORAL at 06:24

## 2022-01-01 RX ADMIN — SCOPALAMINE 1 PATCH: 1 PATCH, EXTENDED RELEASE TRANSDERMAL at 13:52

## 2022-01-01 RX ADMIN — HYDRALAZINE HYDROCHLORIDE 25 MG: 25 TABLET, FILM COATED ORAL at 08:46

## 2022-01-01 RX ADMIN — QUETIAPINE FUMARATE 25 MG: 25 TABLET ORAL at 19:50

## 2022-01-01 RX ADMIN — OXYBUTYNIN CHLORIDE 10 MG: 5 TABLET, EXTENDED RELEASE ORAL at 08:02

## 2022-01-01 RX ADMIN — PHENAZOPYRIDINE HYDROCHLORIDE 200 MG: 100 TABLET ORAL at 17:26

## 2022-01-01 RX ADMIN — DOCUSATE SODIUM 100 MG: 100 CAPSULE, LIQUID FILLED ORAL at 08:54

## 2022-01-01 RX ADMIN — LORAZEPAM 0.5 MG: 0.5 TABLET ORAL at 08:54

## 2022-01-01 RX ADMIN — SODIUM CHLORIDE 75 ML/HR: 9 INJECTION, SOLUTION INTRAVENOUS at 23:24

## 2022-01-01 RX ADMIN — PREDNISONE 5 MG: 5 TABLET ORAL at 08:16

## 2022-01-01 RX ADMIN — NYSTATIN 500000 UNITS: 100000 SUSPENSION ORAL at 08:40

## 2022-01-01 RX ADMIN — QUETIAPINE FUMARATE 25 MG: 25 TABLET ORAL at 20:51

## 2022-01-01 RX ADMIN — Medication 10 ML: at 20:16

## 2022-01-01 RX ADMIN — FUROSEMIDE 20 MG: 10 INJECTION INTRAMUSCULAR; INTRAVENOUS at 13:53

## 2022-01-01 RX ADMIN — PHENAZOPYRIDINE HYDROCHLORIDE 200 MG: 100 TABLET ORAL at 08:05

## 2022-01-01 RX ADMIN — Medication 10 ML: at 23:19

## 2022-01-01 RX ADMIN — MINERAL OIL: 1000 LIQUID ORAL at 11:54

## 2022-01-01 RX ADMIN — MINERAL OIL: 1000 LIQUID ORAL at 23:08

## 2022-01-01 RX ADMIN — AMLODIPINE BESYLATE 2.5 MG: 2.5 TABLET ORAL at 08:06

## 2022-01-01 RX ADMIN — ACETAMINOPHEN 650 MG: 325 TABLET ORAL at 17:26

## 2022-01-01 RX ADMIN — PREDNISONE 5 MG: 5 TABLET ORAL at 17:01

## 2022-01-01 RX ADMIN — MIDAZOLAM 1 MG: 1 INJECTION, SOLUTION INTRAMUSCULAR; INTRAVENOUS at 15:48

## 2022-01-01 RX ADMIN — LORAZEPAM 0.5 MG: 0.5 TABLET ORAL at 20:58

## 2022-01-01 RX ADMIN — LORAZEPAM 0.25 MG: 0.5 TABLET ORAL at 09:31

## 2022-01-01 RX ADMIN — Medication 5 MG: at 19:51

## 2022-01-01 RX ADMIN — LORAZEPAM 0.5 MG: 0.5 TABLET ORAL at 21:59

## 2022-01-01 RX ADMIN — IPRATROPIUM BROMIDE AND ALBUTEROL SULFATE 3 ML: .5; 3 SOLUTION RESPIRATORY (INHALATION) at 19:16

## 2022-01-01 RX ADMIN — SENNOSIDES AND DOCUSATE SODIUM 2 TABLET: 50; 8.6 TABLET ORAL at 08:41

## 2022-01-01 RX ADMIN — IPRATROPIUM BROMIDE AND ALBUTEROL SULFATE 3 ML: .5; 3 SOLUTION RESPIRATORY (INHALATION) at 15:18

## 2022-01-01 RX ADMIN — HEPARIN SODIUM 5000 UNITS: 5000 INJECTION INTRAVENOUS; SUBCUTANEOUS at 13:46

## 2022-01-01 RX ADMIN — MORPHINE SULFATE 1 MG: 2 INJECTION, SOLUTION INTRAMUSCULAR; INTRAVENOUS at 20:05

## 2022-01-01 RX ADMIN — MINERAL OIL: 1000 LIQUID ORAL at 23:17

## 2022-01-01 RX ADMIN — DOXYCYCLINE 100 MG: 100 INJECTION, POWDER, LYOPHILIZED, FOR SOLUTION INTRAVENOUS at 17:46

## 2022-01-01 RX ADMIN — OXYBUTYNIN CHLORIDE 10 MG: 5 TABLET, EXTENDED RELEASE ORAL at 08:06

## 2022-01-01 RX ADMIN — CEFUROXIME AXETIL 500 MG: 250 TABLET, FILM COATED ORAL at 08:46

## 2022-01-01 RX ADMIN — MINERAL OIL: 1000 LIQUID ORAL at 02:35

## 2022-01-01 RX ADMIN — HEPARIN SODIUM 5000 UNITS: 5000 INJECTION INTRAVENOUS; SUBCUTANEOUS at 20:58

## 2022-01-01 RX ADMIN — HYDROMORPHONE HYDROCHLORIDE 1 MG: 1 INJECTION, SOLUTION INTRAMUSCULAR; INTRAVENOUS; SUBCUTANEOUS at 03:21

## 2022-01-01 RX ADMIN — TAMSULOSIN HYDROCHLORIDE 0.4 MG: 0.4 CAPSULE ORAL at 09:26

## 2022-01-01 RX ADMIN — DOCUSATE SODIUM 100 MG: 100 CAPSULE, LIQUID FILLED ORAL at 08:26

## 2022-01-01 RX ADMIN — DOCUSATE SODIUM 100 MG: 100 CAPSULE, LIQUID FILLED ORAL at 22:31

## 2022-01-01 RX ADMIN — HYDROCORTISONE SODIUM SUCCINATE 50 MG: 100 INJECTION, POWDER, FOR SOLUTION INTRAMUSCULAR; INTRAVENOUS at 05:29

## 2022-01-01 RX ADMIN — FUROSEMIDE 20 MG: 10 INJECTION INTRAMUSCULAR; INTRAVENOUS at 00:19

## 2022-01-01 RX ADMIN — Medication 5 MG: at 20:05

## 2022-01-01 RX ADMIN — IPRATROPIUM BROMIDE AND ALBUTEROL SULFATE 3 ML: .5; 3 SOLUTION RESPIRATORY (INHALATION) at 02:30

## 2022-01-01 RX ADMIN — LORAZEPAM 0.5 MG: 0.5 TABLET ORAL at 21:25

## 2022-01-01 RX ADMIN — IPRATROPIUM BROMIDE AND ALBUTEROL SULFATE 3 ML: .5; 3 SOLUTION RESPIRATORY (INHALATION) at 13:30

## 2022-01-01 RX ADMIN — PHENAZOPYRIDINE HYDROCHLORIDE 200 MG: 100 TABLET ORAL at 18:25

## 2022-01-01 RX ADMIN — DOCUSATE SODIUM 100 MG: 100 CAPSULE, LIQUID FILLED ORAL at 21:48

## 2022-01-01 RX ADMIN — HYDROMORPHONE HYDROCHLORIDE 1 MG: 1 INJECTION, SOLUTION INTRAMUSCULAR; INTRAVENOUS; SUBCUTANEOUS at 08:35

## 2022-01-01 RX ADMIN — LORAZEPAM 0.5 MG: 0.5 TABLET ORAL at 08:39

## 2022-01-01 RX ADMIN — MIDAZOLAM 1 MG: 1 INJECTION, SOLUTION INTRAMUSCULAR; INTRAVENOUS at 23:08

## 2022-01-01 RX ADMIN — PHENAZOPYRIDINE HYDROCHLORIDE 200 MG: 100 TABLET ORAL at 13:07

## 2022-01-01 RX ADMIN — DOXYCYCLINE 100 MG: 100 INJECTION, POWDER, LYOPHILIZED, FOR SOLUTION INTRAVENOUS at 05:59

## 2022-01-01 RX ADMIN — MINERAL OIL: 1000 LIQUID ORAL at 20:11

## 2022-01-01 RX ADMIN — SODIUM CHLORIDE 1000 ML: 9 INJECTION, SOLUTION INTRAVENOUS at 00:36

## 2022-01-01 RX ADMIN — FUROSEMIDE 20 MG: 10 INJECTION INTRAMUSCULAR; INTRAVENOUS at 05:21

## 2022-01-01 RX ADMIN — LORAZEPAM 0.5 MG: 0.5 TABLET ORAL at 08:26

## 2022-01-01 RX ADMIN — SCOPALAMINE 1 PATCH: 1 PATCH, EXTENDED RELEASE TRANSDERMAL at 12:51

## 2022-01-01 RX ADMIN — IPRATROPIUM BROMIDE AND ALBUTEROL SULFATE 3 ML: .5; 3 SOLUTION RESPIRATORY (INHALATION) at 10:17

## 2022-01-01 RX ADMIN — KETOROLAC TROMETHAMINE 15 MG: 15 INJECTION, SOLUTION INTRAMUSCULAR; INTRAVENOUS at 18:15

## 2022-01-01 RX ADMIN — POLYVINYL ALCOHOL 2 DROP: 14 SOLUTION/ DROPS OPHTHALMIC at 10:37

## 2022-01-01 RX ADMIN — MINERAL OIL: 1000 LIQUID ORAL at 17:12

## 2022-01-01 RX ADMIN — POLYVINYL ALCOHOL 2 DROP: 14 SOLUTION/ DROPS OPHTHALMIC at 11:46

## 2022-01-01 RX ADMIN — HEPARIN SODIUM 5000 UNITS: 5000 INJECTION INTRAVENOUS; SUBCUTANEOUS at 21:48

## 2022-01-01 RX ADMIN — CEFEPIME 2 G: 2 INJECTION, POWDER, FOR SOLUTION INTRAVENOUS at 15:35

## 2022-01-01 RX ADMIN — Medication 10 ML: at 21:25

## 2022-01-01 RX ADMIN — Medication 10 ML: at 09:13

## 2022-01-01 RX ADMIN — AZELASTINE HYDROCHLORIDE 2 SPRAY: 137 SPRAY, METERED NASAL at 20:26

## 2022-01-01 RX ADMIN — LORAZEPAM 1 MG: 2 INJECTION INTRAMUSCULAR; INTRAVENOUS at 16:47

## 2022-01-01 RX ADMIN — DOXYCYCLINE 100 MG: 100 INJECTION, POWDER, LYOPHILIZED, FOR SOLUTION INTRAVENOUS at 17:05

## 2022-01-01 RX ADMIN — LORAZEPAM 0.5 MG: 0.5 TABLET ORAL at 09:24

## 2022-01-01 RX ADMIN — DIPHENHYDRAMINE HYDROCHLORIDE AND LIDOCAINE HYDROCHLORIDE AND ALUMINUM HYDROXIDE AND MAGNESIUM HYDRO 5 ML: KIT at 13:16

## 2022-01-01 RX ADMIN — PHENAZOPYRIDINE HYDROCHLORIDE 200 MG: 100 TABLET ORAL at 08:39

## 2022-01-01 RX ADMIN — MINERAL OIL: 1000 LIQUID ORAL at 05:21

## 2022-01-01 RX ADMIN — HEPARIN SODIUM 5000 UNITS: 5000 INJECTION INTRAVENOUS; SUBCUTANEOUS at 06:06

## 2022-01-01 RX ADMIN — SODIUM CHLORIDE 75 ML/HR: 9 INJECTION, SOLUTION INTRAVENOUS at 23:59

## 2022-01-01 RX ADMIN — BISACODYL 10 MG: 5 TABLET, COATED ORAL at 13:07

## 2022-01-01 RX ADMIN — LEVOTHYROXINE SODIUM 137 MCG: 137 TABLET ORAL at 05:28

## 2022-01-01 RX ADMIN — FUROSEMIDE 20 MG: 10 INJECTION INTRAMUSCULAR; INTRAVENOUS at 23:17

## 2022-01-01 RX ADMIN — IPRATROPIUM BROMIDE AND ALBUTEROL SULFATE 3 ML: .5; 3 SOLUTION RESPIRATORY (INHALATION) at 15:37

## 2022-01-01 RX ADMIN — HALOPERIDOL LACTATE 1 MG: 5 INJECTION, SOLUTION INTRAMUSCULAR at 18:17

## 2022-01-01 RX ADMIN — AMLODIPINE BESYLATE 2.5 MG: 2.5 TABLET ORAL at 09:24

## 2022-01-01 RX ADMIN — HYDROCORTISONE SODIUM SUCCINATE 50 MG: 100 INJECTION, POWDER, FOR SOLUTION INTRAMUSCULAR; INTRAVENOUS at 08:26

## 2022-01-01 RX ADMIN — HYDRALAZINE HYDROCHLORIDE 25 MG: 25 TABLET, FILM COATED ORAL at 08:39

## 2022-01-01 RX ADMIN — Medication 10 ML: at 20:07

## 2022-01-01 RX ADMIN — FUROSEMIDE 20 MG: 10 INJECTION INTRAMUSCULAR; INTRAVENOUS at 17:21

## 2022-01-01 RX ADMIN — MINERAL OIL: 1000 LIQUID ORAL at 06:35

## 2022-01-01 RX ADMIN — HEPARIN SODIUM 5000 UNITS: 5000 INJECTION INTRAVENOUS; SUBCUTANEOUS at 09:26

## 2022-01-01 RX ADMIN — DOXYCYCLINE 100 MG: 100 INJECTION, POWDER, LYOPHILIZED, FOR SOLUTION INTRAVENOUS at 06:40

## 2022-01-01 RX ADMIN — METHYLPREDNISOLONE SODIUM SUCCINATE 60 MG: 125 INJECTION, POWDER, FOR SOLUTION INTRAMUSCULAR; INTRAVENOUS at 03:10

## 2022-01-01 RX ADMIN — HYDROMORPHONE HYDROCHLORIDE 0.5 MG: 1 INJECTION, SOLUTION INTRAMUSCULAR; INTRAVENOUS; SUBCUTANEOUS at 11:53

## 2022-01-01 RX ADMIN — HYDROCORTISONE SODIUM SUCCINATE 50 MG: 100 INJECTION, POWDER, FOR SOLUTION INTRAMUSCULAR; INTRAVENOUS at 16:18

## 2022-01-01 RX ADMIN — PHENAZOPYRIDINE HYDROCHLORIDE 200 MG: 100 TABLET ORAL at 09:17

## 2022-01-01 RX ADMIN — IPRATROPIUM BROMIDE AND ALBUTEROL SULFATE 3 ML: .5; 3 SOLUTION RESPIRATORY (INHALATION) at 18:41

## 2022-01-01 RX ADMIN — Medication 5 MG: at 18:49

## 2022-01-01 RX ADMIN — BUDESONIDE AND FORMOTEROL FUMARATE DIHYDRATE 2 PUFF: 80; 4.5 AEROSOL RESPIRATORY (INHALATION) at 20:24

## 2022-01-01 RX ADMIN — PHENAZOPYRIDINE HYDROCHLORIDE 200 MG: 100 TABLET ORAL at 01:10

## 2022-01-01 RX ADMIN — HALOPERIDOL LACTATE 2 MG: 5 INJECTION, SOLUTION INTRAMUSCULAR at 17:12

## 2022-01-01 RX ADMIN — TAMSULOSIN HYDROCHLORIDE 0.4 MG: 0.4 CAPSULE ORAL at 20:06

## 2022-01-01 RX ADMIN — HYDROMORPHONE HYDROCHLORIDE 0.5 MG: 1 INJECTION, SOLUTION INTRAMUSCULAR; INTRAVENOUS; SUBCUTANEOUS at 18:12

## 2022-01-01 RX ADMIN — PREDNISONE 10 MG: 10 TABLET ORAL at 21:45

## 2022-01-01 RX ADMIN — LORAZEPAM 0.25 MG: 0.5 TABLET ORAL at 19:45

## 2022-01-01 RX ADMIN — HYDROMORPHONE HYDROCHLORIDE 1 MG: 1 INJECTION, SOLUTION INTRAMUSCULAR; INTRAVENOUS; SUBCUTANEOUS at 21:20

## 2022-01-01 RX ADMIN — Medication 10 ML: at 08:50

## 2022-01-01 RX ADMIN — HYDROCODONE BITARTRATE AND ACETAMINOPHEN 1 TABLET: 5; 325 TABLET ORAL at 05:11

## 2022-01-01 RX ADMIN — BUDESONIDE AND FORMOTEROL FUMARATE DIHYDRATE 2 PUFF: 80; 4.5 AEROSOL RESPIRATORY (INHALATION) at 09:19

## 2022-01-01 RX ADMIN — POLYVINYL ALCOHOL 2 DROP: 14 SOLUTION/ DROPS OPHTHALMIC at 23:08

## 2022-01-01 RX ADMIN — HYDRALAZINE HYDROCHLORIDE 25 MG: 25 TABLET, FILM COATED ORAL at 17:26

## 2022-01-01 RX ADMIN — DOCUSATE SODIUM 100 MG: 100 CAPSULE, LIQUID FILLED ORAL at 08:02

## 2022-01-01 RX ADMIN — HEPARIN SODIUM 5000 UNITS: 5000 INJECTION INTRAVENOUS; SUBCUTANEOUS at 09:48

## 2022-01-01 RX ADMIN — MINERAL OIL: 1000 LIQUID ORAL at 12:06

## 2022-01-01 RX ADMIN — HYDROMORPHONE HYDROCHLORIDE 0.25 MG: 1 INJECTION, SOLUTION INTRAMUSCULAR; INTRAVENOUS; SUBCUTANEOUS at 08:45

## 2022-01-01 RX ADMIN — HALOPERIDOL LACTATE 2 MG: 5 INJECTION, SOLUTION INTRAMUSCULAR at 05:19

## 2022-01-01 RX ADMIN — BUDESONIDE AND FORMOTEROL FUMARATE DIHYDRATE 2 PUFF: 80; 4.5 AEROSOL RESPIRATORY (INHALATION) at 09:32

## 2022-01-01 RX ADMIN — HYDROMORPHONE HYDROCHLORIDE 0.25 MG: 1 INJECTION, SOLUTION INTRAMUSCULAR; INTRAVENOUS; SUBCUTANEOUS at 23:44

## 2022-01-01 RX ADMIN — IPRATROPIUM BROMIDE AND ALBUTEROL SULFATE 3 ML: .5; 3 SOLUTION RESPIRATORY (INHALATION) at 08:25

## 2022-01-01 RX ADMIN — OXYBUTYNIN CHLORIDE 10 MG: 5 TABLET, EXTENDED RELEASE ORAL at 10:01

## 2022-01-01 RX ADMIN — Medication 10 ML: at 08:42

## 2022-01-01 RX ADMIN — LORAZEPAM 0.25 MG: 2 INJECTION INTRAMUSCULAR; INTRAVENOUS at 16:07

## 2022-01-01 RX ADMIN — FUROSEMIDE 20 MG: 10 INJECTION INTRAMUSCULAR; INTRAVENOUS at 23:40

## 2022-01-01 RX ADMIN — LORAZEPAM 0.5 MG: 0.5 TABLET ORAL at 09:37

## 2022-01-01 RX ADMIN — POLYVINYL ALCOHOL 2 DROP: 14 SOLUTION/ DROPS OPHTHALMIC at 23:31

## 2022-01-01 RX ADMIN — MORPHINE SULFATE 5 MG: 20 SOLUTION ORAL at 23:40

## 2022-01-01 RX ADMIN — DOCUSATE SODIUM 100 MG: 100 CAPSULE, LIQUID FILLED ORAL at 08:06

## 2022-01-01 RX ADMIN — SODIUM CHLORIDE 1 G: 900 INJECTION INTRAVENOUS at 21:45

## 2022-01-01 RX ADMIN — HALOPERIDOL LACTATE 1 MG: 5 INJECTION, SOLUTION INTRAMUSCULAR at 18:21

## 2022-01-01 RX ADMIN — CEFUROXIME AXETIL 500 MG: 250 TABLET, FILM COATED ORAL at 08:39

## 2022-01-01 RX ADMIN — NYSTATIN 500000 UNITS: 100000 SUSPENSION ORAL at 08:47

## 2022-01-01 RX ADMIN — CEFUROXIME AXETIL 500 MG: 250 TABLET, FILM COATED ORAL at 20:25

## 2022-01-01 RX ADMIN — NYSTATIN 500000 UNITS: 100000 SUSPENSION ORAL at 19:24

## 2022-01-01 RX ADMIN — MAGNESIUM SULFATE HEPTAHYDRATE 4 G: 40 INJECTION, SOLUTION INTRAVENOUS at 23:19

## 2022-01-01 RX ADMIN — OXYCODONE HYDROCHLORIDE AND ACETAMINOPHEN 1 TABLET: 5; 325 TABLET ORAL at 16:57

## 2022-01-01 RX ADMIN — PREDNISONE 10 MG: 10 TABLET ORAL at 17:25

## 2022-01-01 RX ADMIN — HYDROCORTISONE SODIUM SUCCINATE 50 MG: 100 INJECTION, POWDER, FOR SOLUTION INTRAMUSCULAR; INTRAVENOUS at 15:27

## 2022-01-01 RX ADMIN — HALOPERIDOL LACTATE 0.5 MG: 5 INJECTION, SOLUTION INTRAMUSCULAR at 23:30

## 2022-01-01 RX ADMIN — HALOPERIDOL LACTATE 2 MG: 5 INJECTION, SOLUTION INTRAMUSCULAR at 23:05

## 2022-01-01 RX ADMIN — TOLVAPTAN 7.5 MG: 15 TABLET ORAL at 13:00

## 2022-01-01 RX ADMIN — FUROSEMIDE 20 MG: 10 INJECTION INTRAMUSCULAR; INTRAVENOUS at 05:23

## 2022-01-01 RX ADMIN — MIDAZOLAM 1 MG/HR: 5 INJECTION, SOLUTION INTRAMUSCULAR; INTRAVENOUS at 13:04

## 2022-01-01 RX ADMIN — HYDRALAZINE HYDROCHLORIDE 25 MG: 25 TABLET, FILM COATED ORAL at 08:05

## 2022-01-01 RX ADMIN — HALOPERIDOL LACTATE 2 MG: 5 INJECTION, SOLUTION INTRAMUSCULAR at 23:27

## 2022-01-01 RX ADMIN — MINERAL OIL: 1000 LIQUID ORAL at 23:28

## 2022-01-01 RX ADMIN — HYDROCORTISONE SODIUM SUCCINATE 50 MG: 100 INJECTION, POWDER, FOR SOLUTION INTRAMUSCULAR; INTRAVENOUS at 21:25

## 2022-01-01 RX ADMIN — LEVOTHYROXINE SODIUM 137 MCG: 137 TABLET ORAL at 05:33

## 2022-01-01 RX ADMIN — OXYBUTYNIN CHLORIDE 10 MG: 5 TABLET, EXTENDED RELEASE ORAL at 08:54

## 2022-01-01 RX ADMIN — HYDROCORTISONE SODIUM SUCCINATE 50 MG: 100 INJECTION, POWDER, FOR SOLUTION INTRAMUSCULAR; INTRAVENOUS at 09:37

## 2022-01-01 RX ADMIN — SCOPALAMINE 1 PATCH: 1 PATCH, EXTENDED RELEASE TRANSDERMAL at 11:38

## 2022-01-01 RX ADMIN — HALOPERIDOL LACTATE 2 MG: 5 INJECTION, SOLUTION INTRAMUSCULAR at 23:40

## 2022-01-01 RX ADMIN — MINERAL OIL: 1000 LIQUID ORAL at 17:53

## 2022-01-01 RX ADMIN — IPRATROPIUM BROMIDE AND ALBUTEROL SULFATE 3 ML: .5; 3 SOLUTION RESPIRATORY (INHALATION) at 23:08

## 2022-01-01 RX ADMIN — NYSTATIN 500000 UNITS: 100000 SUSPENSION ORAL at 19:26

## 2022-01-01 RX ADMIN — Medication 10 ML: at 08:01

## 2022-01-01 RX ADMIN — Medication 10 ML: at 08:16

## 2022-01-01 RX ADMIN — HYDRALAZINE HYDROCHLORIDE 25 MG: 50 TABLET, FILM COATED ORAL at 09:26

## 2022-01-01 RX ADMIN — MIDAZOLAM 1 MG: 1 INJECTION, SOLUTION INTRAMUSCULAR; INTRAVENOUS at 03:05

## 2022-01-01 RX ADMIN — HYDROCODONE BITARTRATE AND ACETAMINOPHEN 1 TABLET: 5; 325 TABLET ORAL at 11:44

## 2022-01-01 RX ADMIN — NYSTATIN 500000 UNITS: 100000 SUSPENSION ORAL at 13:07

## 2022-01-01 RX ADMIN — POLYVINYL ALCOHOL 2 DROP: 14 SOLUTION/ DROPS OPHTHALMIC at 11:36

## 2022-01-01 RX ADMIN — HYDROMORPHONE HYDROCHLORIDE 1 MG: 1 INJECTION, SOLUTION INTRAMUSCULAR; INTRAVENOUS; SUBCUTANEOUS at 18:56

## 2022-01-01 RX ADMIN — TAMSULOSIN HYDROCHLORIDE 0.4 MG: 0.4 CAPSULE ORAL at 20:58

## 2022-01-01 RX ADMIN — FUROSEMIDE 20 MG: 10 INJECTION INTRAMUSCULAR; INTRAVENOUS at 05:32

## 2022-01-01 RX ADMIN — MINERAL OIL: 1000 LIQUID ORAL at 05:12

## 2022-01-01 RX ADMIN — MIDAZOLAM 1 MG: 1 INJECTION, SOLUTION INTRAMUSCULAR; INTRAVENOUS at 12:01

## 2022-01-01 RX ADMIN — HALOPERIDOL LACTATE 2 MG: 5 INJECTION, SOLUTION INTRAMUSCULAR at 00:48

## 2022-01-01 RX ADMIN — HALOPERIDOL LACTATE 2 MG: 5 INJECTION, SOLUTION INTRAMUSCULAR at 05:22

## 2022-01-01 RX ADMIN — METRONIDAZOLE 500 MG: 500 INJECTION, SOLUTION INTRAVENOUS at 01:23

## 2022-01-01 RX ADMIN — HYDROCORTISONE SODIUM SUCCINATE 50 MG: 100 INJECTION, POWDER, FOR SOLUTION INTRAMUSCULAR; INTRAVENOUS at 16:30

## 2022-01-01 RX ADMIN — MINERAL OIL: 1000 LIQUID ORAL at 23:31

## 2022-01-01 RX ADMIN — MIDAZOLAM 1 MG: 1 INJECTION, SOLUTION INTRAMUSCULAR; INTRAVENOUS at 07:48

## 2022-01-01 RX ADMIN — HEPARIN SODIUM 5000 UNITS: 5000 INJECTION INTRAVENOUS; SUBCUTANEOUS at 05:45

## 2022-01-01 RX ADMIN — Medication 10 ML: at 21:37

## 2022-01-01 RX ADMIN — TAMSULOSIN HYDROCHLORIDE 0.4 MG: 0.4 CAPSULE ORAL at 23:12

## 2022-01-01 RX ADMIN — LORAZEPAM 0.5 MG: 0.5 TABLET ORAL at 08:46

## 2022-01-01 RX ADMIN — PREDNISONE 10 MG: 10 TABLET ORAL at 08:02

## 2022-01-01 RX ADMIN — METHYLPREDNISOLONE SODIUM SUCCINATE 40 MG: 40 INJECTION, POWDER, FOR SOLUTION INTRAMUSCULAR; INTRAVENOUS at 08:41

## 2022-01-01 RX ADMIN — HYDROCORTISONE SODIUM SUCCINATE 50 MG: 100 INJECTION, POWDER, FOR SOLUTION INTRAMUSCULAR; INTRAVENOUS at 15:52

## 2022-01-01 RX ADMIN — PHENAZOPYRIDINE HYDROCHLORIDE 200 MG: 100 TABLET ORAL at 17:01

## 2022-01-01 RX ADMIN — MINERAL OIL: 1000 LIQUID ORAL at 15:04

## 2022-01-01 RX ADMIN — Medication 10 ML: at 08:28

## 2022-01-01 RX ADMIN — LEVOTHYROXINE SODIUM 137 MCG: 25 TABLET ORAL at 08:04

## 2022-01-01 RX ADMIN — HYDROCORTISONE SODIUM SUCCINATE 50 MG: 100 INJECTION, POWDER, FOR SOLUTION INTRAMUSCULAR; INTRAVENOUS at 09:00

## 2022-01-01 RX ADMIN — HALOPERIDOL LACTATE 2 MG: 5 INJECTION, SOLUTION INTRAMUSCULAR at 18:55

## 2022-01-01 RX ADMIN — MINERAL OIL: 1000 LIQUID ORAL at 17:22

## 2022-01-01 RX ADMIN — SODIUM CHLORIDE 1 G: 900 INJECTION INTRAVENOUS at 19:55

## 2022-01-01 RX ADMIN — HEPARIN SODIUM 5000 UNITS: 5000 INJECTION INTRAVENOUS; SUBCUTANEOUS at 19:50

## 2022-01-01 RX ADMIN — POLYVINYL ALCOHOL 2 DROP: 14 SOLUTION/ DROPS OPHTHALMIC at 23:05

## 2022-01-01 RX ADMIN — METHYLPREDNISOLONE SODIUM SUCCINATE 60 MG: 125 INJECTION, POWDER, FOR SOLUTION INTRAMUSCULAR; INTRAVENOUS at 02:11

## 2022-01-01 RX ADMIN — LEVOTHYROXINE SODIUM 137 MCG: 137 TABLET ORAL at 05:59

## 2022-01-01 RX ADMIN — MIDAZOLAM 1 MG: 1 INJECTION, SOLUTION INTRAMUSCULAR; INTRAVENOUS at 10:37

## 2022-01-01 RX ADMIN — HALOPERIDOL LACTATE 1 MG: 5 INJECTION, SOLUTION INTRAMUSCULAR at 13:10

## 2022-01-01 RX ADMIN — QUETIAPINE FUMARATE 25 MG: 25 TABLET ORAL at 21:50

## 2022-01-01 RX ADMIN — HALOPERIDOL LACTATE 2 MG: 5 INJECTION, SOLUTION INTRAMUSCULAR at 05:32

## 2022-01-01 RX ADMIN — LEVOTHYROXINE SODIUM 137 MCG: 137 TABLET ORAL at 05:45

## 2022-01-01 RX ADMIN — TAMSULOSIN HYDROCHLORIDE 0.4 MG: 0.4 CAPSULE ORAL at 21:25

## 2022-01-01 RX ADMIN — MINERAL OIL: 1000 LIQUID ORAL at 05:19

## 2022-01-01 RX ADMIN — MINERAL OIL: 1000 LIQUID ORAL at 20:42

## 2022-01-01 RX ADMIN — HALOPERIDOL LACTATE 1 MG: 5 INJECTION, SOLUTION INTRAMUSCULAR at 13:53

## 2022-01-01 RX ADMIN — FUROSEMIDE 20 MG: 10 INJECTION INTRAMUSCULAR; INTRAVENOUS at 17:12

## 2022-01-01 RX ADMIN — DIPHENHYDRAMINE HYDROCHLORIDE AND LIDOCAINE HYDROCHLORIDE AND ALUMINUM HYDROXIDE AND MAGNESIUM HYDRO 5 ML: KIT at 01:09

## 2022-01-01 RX ADMIN — MINERAL OIL: 1000 LIQUID ORAL at 11:36

## 2022-01-01 RX ADMIN — Medication 10 ML: at 09:49

## 2022-01-01 RX ADMIN — LEVOTHYROXINE SODIUM 137 MCG: 137 TABLET ORAL at 08:06

## 2022-01-01 RX ADMIN — CEFEPIME 2 G: 2 INJECTION, POWDER, FOR SOLUTION INTRAVENOUS at 16:14

## 2022-01-01 RX ADMIN — HYDROMORPHONE HYDROCHLORIDE 0.5 MG: 1 INJECTION, SOLUTION INTRAMUSCULAR; INTRAVENOUS; SUBCUTANEOUS at 13:11

## 2022-01-01 RX ADMIN — BUDESONIDE AND FORMOTEROL FUMARATE DIHYDRATE 2 PUFF: 80; 4.5 AEROSOL RESPIRATORY (INHALATION) at 12:47

## 2022-01-01 RX ADMIN — OXYBUTYNIN CHLORIDE 10 MG: 5 TABLET, EXTENDED RELEASE ORAL at 09:25

## 2022-01-01 RX ADMIN — FUROSEMIDE 20 MG: 10 INJECTION INTRAMUSCULAR; INTRAVENOUS at 17:48

## 2022-01-01 RX ADMIN — HYDROMORPHONE HYDROCHLORIDE 1 MG: 1 INJECTION, SOLUTION INTRAMUSCULAR; INTRAVENOUS; SUBCUTANEOUS at 04:49

## 2022-01-01 RX ADMIN — LORAZEPAM 0.25 MG: 0.5 TABLET ORAL at 08:02

## 2022-01-01 RX ADMIN — HEPARIN SODIUM 5000 UNITS: 5000 INJECTION INTRAVENOUS; SUBCUTANEOUS at 08:41

## 2022-01-01 RX ADMIN — HYDROMORPHONE HYDROCHLORIDE 0.5 MG: 1 INJECTION, SOLUTION INTRAMUSCULAR; INTRAVENOUS; SUBCUTANEOUS at 18:21

## 2022-01-01 RX ADMIN — AZELASTINE HYDROCHLORIDE 2 SPRAY: 137 SPRAY, METERED NASAL at 09:31

## 2022-01-01 RX ADMIN — METHYLPREDNISOLONE SODIUM SUCCINATE 60 MG: 125 INJECTION, POWDER, FOR SOLUTION INTRAMUSCULAR; INTRAVENOUS at 09:48

## 2022-01-01 RX ADMIN — PREDNISONE 10 MG: 10 TABLET ORAL at 09:17

## 2022-01-01 RX ADMIN — PHENAZOPYRIDINE HYDROCHLORIDE 200 MG: 100 TABLET ORAL at 19:23

## 2022-01-01 RX ADMIN — DOCUSATE SODIUM 100 MG: 100 CAPSULE, LIQUID FILLED ORAL at 20:08

## 2022-01-01 RX ADMIN — POLYVINYL ALCOHOL 2 DROP: 14 SOLUTION/ DROPS OPHTHALMIC at 23:40

## 2022-01-01 RX ADMIN — HYDRALAZINE HYDROCHLORIDE 25 MG: 50 TABLET, FILM COATED ORAL at 19:50

## 2022-01-01 RX ADMIN — DOCUSATE SODIUM 100 MG: 100 CAPSULE, LIQUID FILLED ORAL at 09:37

## 2022-01-01 RX ADMIN — HYDROCORTISONE SODIUM SUCCINATE 50 MG: 100 INJECTION, POWDER, FOR SOLUTION INTRAMUSCULAR; INTRAVENOUS at 21:40

## 2022-01-01 RX ADMIN — Medication 10 ML: at 09:18

## 2022-01-01 RX ADMIN — PHENAZOPYRIDINE HYDROCHLORIDE 200 MG: 100 TABLET ORAL at 11:44

## 2022-01-01 RX ADMIN — Medication 1 CAPSULE: at 08:39

## 2022-01-01 RX ADMIN — HYDROCORTISONE SODIUM SUCCINATE 50 MG: 100 INJECTION, POWDER, FOR SOLUTION INTRAMUSCULAR; INTRAVENOUS at 10:00

## 2022-01-01 RX ADMIN — HYDROCORTISONE SODIUM SUCCINATE 50 MG: 100 INJECTION, POWDER, FOR SOLUTION INTRAMUSCULAR; INTRAVENOUS at 09:25

## 2022-01-01 RX ADMIN — HEPARIN SODIUM 5000 UNITS: 5000 INJECTION INTRAVENOUS; SUBCUTANEOUS at 20:09

## 2022-01-01 RX ADMIN — HEPARIN SODIUM 5000 UNITS: 5000 INJECTION INTRAVENOUS; SUBCUTANEOUS at 22:31

## 2022-01-01 RX ADMIN — HALOPERIDOL LACTATE 2 MG: 5 INJECTION, SOLUTION INTRAMUSCULAR at 23:30

## 2022-01-01 RX ADMIN — HYDRALAZINE HYDROCHLORIDE 25 MG: 25 TABLET, FILM COATED ORAL at 20:26

## 2022-01-01 RX ADMIN — HALOPERIDOL LACTATE 1 MG: 5 INJECTION, SOLUTION INTRAMUSCULAR at 18:12

## 2022-01-01 RX ADMIN — HYDROCORTISONE SODIUM SUCCINATE 50 MG: 100 INJECTION, POWDER, FOR SOLUTION INTRAMUSCULAR; INTRAVENOUS at 15:25

## 2022-01-01 RX ADMIN — LORAZEPAM 0.5 MG: 0.5 TABLET ORAL at 23:21

## 2022-01-01 RX ADMIN — BISACODYL 10 MG: 10 SUPPOSITORY RECTAL at 20:17

## 2022-01-01 RX ADMIN — HYDROMORPHONE HYDROCHLORIDE 0.5 MG: 1 INJECTION, SOLUTION INTRAMUSCULAR; INTRAVENOUS; SUBCUTANEOUS at 15:04

## 2022-01-01 RX ADMIN — PHENAZOPYRIDINE HYDROCHLORIDE 200 MG: 100 TABLET ORAL at 11:35

## 2022-01-01 RX ADMIN — CEFEPIME 2 G: 2 INJECTION, POWDER, FOR SOLUTION INTRAVENOUS at 03:10

## 2022-01-01 RX ADMIN — PREDNISONE 5 MG: 5 TABLET ORAL at 08:46

## 2022-01-01 RX ADMIN — HYDRALAZINE HYDROCHLORIDE 25 MG: 50 TABLET, FILM COATED ORAL at 20:11

## 2022-01-01 RX ADMIN — HYDROCORTISONE SODIUM SUCCINATE 50 MG: 100 INJECTION, POWDER, FOR SOLUTION INTRAMUSCULAR; INTRAVENOUS at 20:08

## 2022-01-01 RX ADMIN — DOXYCYCLINE 100 MG: 100 INJECTION, POWDER, LYOPHILIZED, FOR SOLUTION INTRAVENOUS at 05:31

## 2022-01-01 RX ADMIN — MIDAZOLAM 1 MG: 1 INJECTION, SOLUTION INTRAMUSCULAR; INTRAVENOUS at 06:35

## 2022-01-01 RX ADMIN — HYDROMORPHONE HYDROCHLORIDE 0.5 MG: 1 INJECTION, SOLUTION INTRAMUSCULAR; INTRAVENOUS; SUBCUTANEOUS at 02:34

## 2022-01-01 RX ADMIN — SODIUM CHLORIDE, POTASSIUM CHLORIDE, SODIUM LACTATE AND CALCIUM CHLORIDE 50 ML/HR: 600; 310; 30; 20 INJECTION, SOLUTION INTRAVENOUS at 03:52

## 2022-01-01 RX ADMIN — MINERAL OIL: 1000 LIQUID ORAL at 21:26

## 2022-01-01 RX ADMIN — POLYVINYL ALCOHOL 2 DROP: 14 SOLUTION/ DROPS OPHTHALMIC at 20:58

## 2022-01-01 RX ADMIN — DIPHENHYDRAMINE HYDROCHLORIDE AND LIDOCAINE HYDROCHLORIDE AND ALUMINUM HYDROXIDE AND MAGNESIUM HYDRO 5 ML: KIT at 17:27

## 2022-01-01 RX ADMIN — DOXYCYCLINE 100 MG: 100 INJECTION, POWDER, LYOPHILIZED, FOR SOLUTION INTRAVENOUS at 18:06

## 2022-01-01 RX ADMIN — MIDAZOLAM 1 MG/HR: 5 INJECTION, SOLUTION INTRAMUSCULAR; INTRAVENOUS at 13:39

## 2022-01-01 RX ADMIN — BUDESONIDE AND FORMOTEROL FUMARATE DIHYDRATE 2 PUFF: 80; 4.5 AEROSOL RESPIRATORY (INHALATION) at 08:05

## 2022-01-01 RX ADMIN — Medication 10 ML: at 08:55

## 2022-01-01 RX ADMIN — MINERAL OIL: 1000 LIQUID ORAL at 12:31

## 2022-01-01 RX ADMIN — DIPHENHYDRAMINE HYDROCHLORIDE AND LIDOCAINE HYDROCHLORIDE AND ALUMINUM HYDROXIDE AND MAGNESIUM HYDRO 5 ML: KIT at 22:55

## 2022-01-01 RX ADMIN — BUDESONIDE AND FORMOTEROL FUMARATE DIHYDRATE 2 PUFF: 80; 4.5 AEROSOL RESPIRATORY (INHALATION) at 20:26

## 2022-01-01 RX ADMIN — Medication 10 ML: at 22:32

## 2022-01-01 RX ADMIN — HALOPERIDOL LACTATE 2 MG: 5 INJECTION, SOLUTION INTRAMUSCULAR at 17:53

## 2022-01-01 RX ADMIN — HYDROMORPHONE HYDROCHLORIDE 1 MG: 1 INJECTION, SOLUTION INTRAMUSCULAR; INTRAVENOUS; SUBCUTANEOUS at 23:08

## 2022-01-01 RX ADMIN — MINERAL OIL: 1000 LIQUID ORAL at 00:19

## 2022-01-01 RX ADMIN — FUROSEMIDE 20 MG: 10 INJECTION INTRAMUSCULAR; INTRAVENOUS at 05:09

## 2022-01-01 RX ADMIN — MIDAZOLAM 1 MG: 1 INJECTION, SOLUTION INTRAMUSCULAR; INTRAVENOUS at 20:42

## 2022-01-01 RX ADMIN — IPRATROPIUM BROMIDE AND ALBUTEROL SULFATE 3 ML: .5; 3 SOLUTION RESPIRATORY (INHALATION) at 07:48

## 2022-01-01 RX ADMIN — FUROSEMIDE 20 MG: 10 INJECTION INTRAMUSCULAR; INTRAVENOUS at 17:16

## 2022-01-01 RX ADMIN — HALOPERIDOL LACTATE 5 MG: 5 INJECTION, SOLUTION INTRAMUSCULAR at 19:56

## 2022-01-01 RX ADMIN — LORAZEPAM 0.25 MG: 0.5 TABLET ORAL at 09:17

## 2022-01-01 RX ADMIN — HEPARIN SODIUM 5000 UNITS: 5000 INJECTION INTRAVENOUS; SUBCUTANEOUS at 12:59

## 2022-01-01 RX ADMIN — FUROSEMIDE 20 MG: 10 INJECTION INTRAMUSCULAR; INTRAVENOUS at 12:30

## 2022-01-01 RX ADMIN — MINERAL OIL: 1000 LIQUID ORAL at 23:40

## 2022-01-01 RX ADMIN — POLYVINYL ALCOHOL 2 DROP: 14 SOLUTION/ DROPS OPHTHALMIC at 00:20

## 2022-01-01 RX ADMIN — MINERAL OIL: 1000 LIQUID ORAL at 16:38

## 2022-01-01 RX ADMIN — HEPARIN SODIUM 5000 UNITS: 5000 INJECTION INTRAVENOUS; SUBCUTANEOUS at 05:29

## 2022-01-01 RX ADMIN — PHENAZOPYRIDINE HYDROCHLORIDE 200 MG: 100 TABLET ORAL at 13:16

## 2022-01-01 RX ADMIN — HEPARIN SODIUM 5000 UNITS: 5000 INJECTION INTRAVENOUS; SUBCUTANEOUS at 21:25

## 2022-01-01 RX ADMIN — Medication 10 ML: at 09:26

## 2022-01-01 RX ADMIN — FERROUS SULFATE TAB 325 MG (65 MG ELEMENTAL FE) 325 MG: 325 (65 FE) TAB at 09:45

## 2022-01-01 RX ADMIN — SODIUM CHLORIDE 1000 ML: 9 INJECTION, SOLUTION INTRAVENOUS at 15:45

## 2022-01-01 RX ADMIN — MINERAL OIL: 1000 LIQUID ORAL at 12:02

## 2022-01-01 RX ADMIN — MINERAL OIL: 1000 LIQUID ORAL at 05:09

## 2022-01-01 RX ADMIN — FUROSEMIDE 40 MG: 40 TABLET ORAL at 13:46

## 2022-01-01 RX ADMIN — FUROSEMIDE 20 MG: 10 INJECTION INTRAMUSCULAR; INTRAVENOUS at 00:48

## 2022-01-01 RX ADMIN — DIPHENHYDRAMINE HYDROCHLORIDE AND LIDOCAINE HYDROCHLORIDE AND ALUMINUM HYDROXIDE AND MAGNESIUM HYDRO 5 ML: KIT at 09:17

## 2022-01-01 RX ADMIN — HALOPERIDOL LACTATE 2 MG: 5 INJECTION, SOLUTION INTRAMUSCULAR at 17:21

## 2022-01-01 RX ADMIN — HEPARIN SODIUM 5000 UNITS: 5000 INJECTION INTRAVENOUS; SUBCUTANEOUS at 05:41

## 2022-01-01 RX ADMIN — FUROSEMIDE 40 MG: 40 TABLET ORAL at 08:02

## 2022-01-01 RX ADMIN — BUDESONIDE AND FORMOTEROL FUMARATE DIHYDRATE 2 PUFF: 80; 4.5 AEROSOL RESPIRATORY (INHALATION) at 20:37

## 2022-01-01 RX ADMIN — IPRATROPIUM BROMIDE AND ALBUTEROL SULFATE 3 ML: .5; 3 SOLUTION RESPIRATORY (INHALATION) at 12:04

## 2022-01-01 RX ADMIN — Medication 10 ML: at 10:02

## 2022-01-01 RX ADMIN — TAMSULOSIN HYDROCHLORIDE 0.4 MG: 0.4 CAPSULE ORAL at 08:41

## 2022-01-01 RX ADMIN — Medication 10 ML: at 20:51

## 2022-01-01 RX ADMIN — HYDROMORPHONE HYDROCHLORIDE 1 MG: 1 INJECTION, SOLUTION INTRAMUSCULAR; INTRAVENOUS; SUBCUTANEOUS at 09:00

## 2022-01-01 RX ADMIN — Medication 10 ML: at 09:37

## 2022-01-01 RX ADMIN — HALOPERIDOL LACTATE 2 MG: 5 INJECTION, SOLUTION INTRAMUSCULAR at 05:09

## 2022-01-01 RX ADMIN — LEVOTHYROXINE SODIUM 137 MCG: 137 TABLET ORAL at 05:41

## 2022-01-01 RX ADMIN — MINERAL OIL: 1000 LIQUID ORAL at 08:45

## 2022-01-01 RX ADMIN — HALOPERIDOL LACTATE 2 MG: 5 INJECTION, SOLUTION INTRAMUSCULAR at 12:30

## 2022-01-01 RX ADMIN — DEXTROSE MONOHYDRATE 50 ML/HR: 50 INJECTION, SOLUTION INTRAVENOUS at 01:53

## 2022-01-01 RX ADMIN — DIPHENHYDRAMINE HYDROCHLORIDE AND LIDOCAINE HYDROCHLORIDE AND ALUMINUM HYDROXIDE AND MAGNESIUM HYDRO 5 ML: KIT at 01:37

## 2022-01-01 RX ADMIN — IPRATROPIUM BROMIDE AND ALBUTEROL SULFATE 3 ML: .5; 3 SOLUTION RESPIRATORY (INHALATION) at 23:06

## 2022-01-01 RX ADMIN — Medication 1 CAPSULE: at 09:58

## 2022-01-01 RX ADMIN — MINERAL OIL: 1000 LIQUID ORAL at 17:16

## 2022-01-01 RX ADMIN — HEPARIN SODIUM 5000 UNITS: 5000 INJECTION INTRAVENOUS; SUBCUTANEOUS at 13:19

## 2022-01-01 RX ADMIN — FUROSEMIDE 20 MG: 10 INJECTION INTRAMUSCULAR; INTRAVENOUS at 11:46

## 2022-01-01 RX ADMIN — MINERAL OIL: 1000 LIQUID ORAL at 03:05

## 2022-01-01 RX ADMIN — MORPHINE SULFATE 1 MG: 2 INJECTION, SOLUTION INTRAMUSCULAR; INTRAVENOUS at 08:04

## 2022-01-01 RX ADMIN — CEFEPIME 2 G: 2 INJECTION, POWDER, FOR SOLUTION INTRAVENOUS at 03:54

## 2022-01-01 RX ADMIN — METHYLPREDNISOLONE SODIUM SUCCINATE 60 MG: 125 INJECTION, POWDER, FOR SOLUTION INTRAMUSCULAR; INTRAVENOUS at 17:44

## 2022-01-01 RX ADMIN — SODIUM CHLORIDE 1 G: 900 INJECTION INTRAVENOUS at 20:05

## 2022-01-01 RX ADMIN — HALOPERIDOL LACTATE 2 MG: 5 INJECTION, SOLUTION INTRAMUSCULAR at 17:16

## 2022-01-01 RX ADMIN — BUDESONIDE AND FORMOTEROL FUMARATE DIHYDRATE 2 PUFF: 80; 4.5 AEROSOL RESPIRATORY (INHALATION) at 11:35

## 2022-01-01 RX ADMIN — MIDAZOLAM 1 MG: 1 INJECTION, SOLUTION INTRAMUSCULAR; INTRAVENOUS at 15:04

## 2022-01-01 RX ADMIN — MORPHINE SULFATE 5 MG: 20 SOLUTION ORAL at 13:27

## 2022-01-01 RX ADMIN — OXYBUTYNIN CHLORIDE 10 MG: 5 TABLET, EXTENDED RELEASE ORAL at 09:37

## 2022-01-01 RX ADMIN — LORAZEPAM 0.25 MG: 0.5 TABLET ORAL at 10:01

## 2022-01-01 RX ADMIN — CEFEPIME 2 G: 2 INJECTION, POWDER, FOR SOLUTION INTRAVENOUS at 04:11

## 2022-01-01 RX ADMIN — PHENAZOPYRIDINE HYDROCHLORIDE 200 MG: 100 TABLET ORAL at 08:46

## 2022-01-01 RX ADMIN — FUROSEMIDE 20 MG: 10 INJECTION INTRAMUSCULAR; INTRAVENOUS at 12:52

## 2022-01-01 RX ADMIN — HYDROMORPHONE HYDROCHLORIDE 0.5 MG: 1 INJECTION, SOLUTION INTRAMUSCULAR; INTRAVENOUS; SUBCUTANEOUS at 20:42

## 2022-01-01 RX ADMIN — BUDESONIDE AND FORMOTEROL FUMARATE DIHYDRATE 2 PUFF: 80; 4.5 AEROSOL RESPIRATORY (INHALATION) at 19:16

## 2022-01-01 RX ADMIN — TAMSULOSIN HYDROCHLORIDE 0.4 MG: 0.4 CAPSULE ORAL at 20:15

## 2022-01-01 RX ADMIN — CEFUROXIME AXETIL 500 MG: 250 TABLET, FILM COATED ORAL at 11:44

## 2022-01-01 RX ADMIN — KETOROLAC TROMETHAMINE 15 MG: 15 INJECTION, SOLUTION INTRAMUSCULAR; INTRAVENOUS at 13:10

## 2022-01-01 RX ADMIN — MINERAL OIL: 1000 LIQUID ORAL at 02:08

## 2022-01-01 RX ADMIN — MIDAZOLAM 1 MG: 1 INJECTION, SOLUTION INTRAMUSCULAR; INTRAVENOUS at 09:00

## 2022-01-01 RX ADMIN — LORAZEPAM 0.5 MG: 0.5 TABLET ORAL at 08:06

## 2022-01-01 RX ADMIN — HALOPERIDOL LACTATE 2 MG: 5 INJECTION, SOLUTION INTRAMUSCULAR at 05:21

## 2022-01-01 RX ADMIN — BUDESONIDE AND FORMOTEROL FUMARATE DIHYDRATE 2 PUFF: 80; 4.5 AEROSOL RESPIRATORY (INHALATION) at 07:07

## 2022-01-01 RX ADMIN — KETOROLAC TROMETHAMINE 15 MG: 15 INJECTION, SOLUTION INTRAMUSCULAR; INTRAVENOUS at 21:20

## 2022-01-01 RX ADMIN — HYDROCORTISONE SODIUM SUCCINATE 50 MG: 100 INJECTION, POWDER, FOR SOLUTION INTRAMUSCULAR; INTRAVENOUS at 15:11

## 2022-01-01 RX ADMIN — MIDAZOLAM 1 MG: 1 INJECTION, SOLUTION INTRAMUSCULAR; INTRAVENOUS at 12:46

## 2022-01-01 RX ADMIN — FUROSEMIDE 20 MG: 10 INJECTION INTRAMUSCULAR; INTRAVENOUS at 06:04

## 2022-01-01 RX ADMIN — MORPHINE SULFATE 5 MG: 20 SOLUTION ORAL at 08:40

## 2022-01-01 RX ADMIN — LORAZEPAM 0.25 MG: 0.5 TABLET ORAL at 20:03

## 2022-01-01 RX ADMIN — HALOPERIDOL LACTATE 2 MG: 5 INJECTION, SOLUTION INTRAMUSCULAR at 00:19

## 2022-01-01 RX ADMIN — PREDNISONE 10 MG: 10 TABLET ORAL at 09:31

## 2022-01-01 RX ADMIN — HYDROMORPHONE HYDROCHLORIDE: 10 INJECTION INTRAMUSCULAR; INTRAVENOUS; SUBCUTANEOUS at 12:25

## 2022-01-01 RX ADMIN — HYDRALAZINE HYDROCHLORIDE 25 MG: 50 TABLET, FILM COATED ORAL at 09:13

## 2022-01-01 RX ADMIN — LEVOTHYROXINE SODIUM 137 MCG: 25 TABLET ORAL at 05:11

## 2022-01-01 RX ADMIN — OXYBUTYNIN CHLORIDE 10 MG: 5 TABLET, EXTENDED RELEASE ORAL at 08:25

## 2022-01-01 RX ADMIN — MINERAL OIL: 1000 LIQUID ORAL at 11:47

## 2022-01-01 RX ADMIN — MINERAL OIL: 1000 LIQUID ORAL at 18:55

## 2022-01-01 RX ADMIN — LORAZEPAM 0.25 MG: 0.5 TABLET ORAL at 22:32

## 2022-01-01 RX ADMIN — HYDROMORPHONE HYDROCHLORIDE 1 MG: 1 INJECTION, SOLUTION INTRAMUSCULAR; INTRAVENOUS; SUBCUTANEOUS at 16:38

## 2022-01-01 RX ADMIN — POLYVINYL ALCOHOL 2 DROP: 14 SOLUTION/ DROPS OPHTHALMIC at 12:02

## 2022-01-01 RX ADMIN — METHYLPREDNISOLONE SODIUM SUCCINATE 125 MG: 125 INJECTION, POWDER, FOR SOLUTION INTRAMUSCULAR; INTRAVENOUS at 02:15

## 2022-01-01 RX ADMIN — HEPARIN SODIUM 5000 UNITS: 5000 INJECTION INTRAVENOUS; SUBCUTANEOUS at 13:07

## 2022-01-01 RX ADMIN — IPRATROPIUM BROMIDE AND ALBUTEROL SULFATE 3 ML: .5; 3 SOLUTION RESPIRATORY (INHALATION) at 03:10

## 2022-01-01 RX ADMIN — HYDROMORPHONE HYDROCHLORIDE 0.5 MG: 1 INJECTION, SOLUTION INTRAMUSCULAR; INTRAVENOUS; SUBCUTANEOUS at 07:49

## 2022-01-01 RX ADMIN — TAMSULOSIN HYDROCHLORIDE 0.4 MG: 0.4 CAPSULE ORAL at 23:20

## 2022-01-01 RX ADMIN — HALOPERIDOL LACTATE 1 MG: 5 INJECTION, SOLUTION INTRAMUSCULAR at 02:07

## 2022-01-01 RX ADMIN — HEPARIN SODIUM 5000 UNITS: 5000 INJECTION INTRAVENOUS; SUBCUTANEOUS at 20:11

## 2022-01-01 RX ADMIN — HYDROCODONE BITARTRATE AND ACETAMINOPHEN 1 TABLET: 5; 325 TABLET ORAL at 20:26

## 2022-01-01 RX ADMIN — IPRATROPIUM BROMIDE AND ALBUTEROL SULFATE 3 ML: .5; 3 SOLUTION RESPIRATORY (INHALATION) at 06:34

## 2022-01-01 RX ADMIN — BUDESONIDE AND FORMOTEROL FUMARATE DIHYDRATE 2 PUFF: 80; 4.5 AEROSOL RESPIRATORY (INHALATION) at 19:56

## 2022-01-01 RX ADMIN — MINERAL OIL: 1000 LIQUID ORAL at 06:04

## 2022-01-01 RX ADMIN — TAMSULOSIN HYDROCHLORIDE 0.4 MG: 0.4 CAPSULE ORAL at 09:14

## 2022-01-01 RX ADMIN — METHYLPREDNISOLONE SODIUM SUCCINATE 60 MG: 125 INJECTION, POWDER, FOR SOLUTION INTRAMUSCULAR; INTRAVENOUS at 09:26

## 2022-01-01 RX ADMIN — Medication 5 MG: at 22:31

## 2022-01-01 RX ADMIN — LEVOTHYROXINE SODIUM 137 MCG: 137 TABLET ORAL at 06:06

## 2022-01-01 RX ADMIN — TAMSULOSIN HYDROCHLORIDE 0.4 MG: 0.4 CAPSULE ORAL at 20:07

## 2022-01-01 RX ADMIN — LORAZEPAM 0.5 MG: 0.5 TABLET ORAL at 20:08

## 2022-01-01 RX ADMIN — IPRATROPIUM BROMIDE AND ALBUTEROL SULFATE 3 ML: .5; 3 SOLUTION RESPIRATORY (INHALATION) at 23:11

## 2022-01-01 RX ADMIN — LORAZEPAM 0.5 MG: 0.5 TABLET ORAL at 21:48

## 2022-01-01 RX ADMIN — POLYVINYL ALCOHOL 2 DROP: 14 SOLUTION/ DROPS OPHTHALMIC at 12:30

## 2022-01-01 RX ADMIN — MINERAL OIL: 1000 LIQUID ORAL at 07:49

## 2022-01-01 RX ADMIN — PREDNISONE 5 MG: 5 TABLET ORAL at 19:23

## 2022-01-01 RX ADMIN — CEFUROXIME AXETIL 500 MG: 250 TABLET, FILM COATED ORAL at 21:58

## 2022-01-01 RX ADMIN — LORAZEPAM 0.25 MG: 0.5 TABLET ORAL at 08:05

## 2022-01-01 RX ADMIN — DOCUSATE SODIUM 100 MG: 100 CAPSULE, LIQUID FILLED ORAL at 23:21

## 2022-01-01 RX ADMIN — MINERAL OIL: 1000 LIQUID ORAL at 23:05

## 2022-01-01 RX ADMIN — DOXYCYCLINE 100 MG: 100 INJECTION, POWDER, LYOPHILIZED, FOR SOLUTION INTRAVENOUS at 04:33

## 2022-01-01 RX ADMIN — IPRATROPIUM BROMIDE AND ALBUTEROL SULFATE 3 ML: .5; 3 SOLUTION RESPIRATORY (INHALATION) at 14:12

## 2022-01-01 RX ADMIN — DIPHENHYDRAMINE HYDROCHLORIDE AND LIDOCAINE HYDROCHLORIDE AND ALUMINUM HYDROXIDE AND MAGNESIUM HYDRO 5 ML: KIT at 14:48

## 2022-01-01 RX ADMIN — MINERAL OIL: 1000 LIQUID ORAL at 17:48

## 2022-01-01 RX ADMIN — TAMSULOSIN HYDROCHLORIDE 0.4 MG: 0.4 CAPSULE ORAL at 21:59

## 2022-01-01 RX ADMIN — Medication 10 ML: at 09:12

## 2022-01-01 RX ADMIN — HYDRALAZINE HYDROCHLORIDE 25 MG: 25 TABLET, FILM COATED ORAL at 09:31

## 2022-01-01 RX ADMIN — MIDAZOLAM 1 MG/HR: 5 INJECTION, SOLUTION INTRAMUSCULAR; INTRAVENOUS at 13:48

## 2022-01-01 RX ADMIN — POLYVINYL ALCOHOL 2 DROP: 14 SOLUTION/ DROPS OPHTHALMIC at 23:17

## 2022-01-01 RX ADMIN — MINERAL OIL: 1000 LIQUID ORAL at 10:37

## 2022-01-01 RX ADMIN — BISACODYL 10 MG: 10 SUPPOSITORY RECTAL at 10:08

## 2022-01-01 RX ADMIN — HALOPERIDOL LACTATE 2 MG: 5 INJECTION, SOLUTION INTRAMUSCULAR at 12:05

## 2022-01-01 RX ADMIN — AZELASTINE HYDROCHLORIDE 2 SPRAY: 137 SPRAY, METERED NASAL at 19:46

## 2022-01-01 RX ADMIN — HYDROMORPHONE HYDROCHLORIDE 0.25 MG: 1 INJECTION, SOLUTION INTRAMUSCULAR; INTRAVENOUS; SUBCUTANEOUS at 20:11

## 2022-01-01 RX ADMIN — FUROSEMIDE 20 MG: 10 INJECTION INTRAMUSCULAR; INTRAVENOUS at 23:05

## 2022-01-01 RX ADMIN — DOCUSATE SODIUM 100 MG: 100 CAPSULE, LIQUID FILLED ORAL at 20:58

## 2022-01-01 RX ADMIN — TAMSULOSIN HYDROCHLORIDE 0.4 MG: 0.4 CAPSULE ORAL at 21:47

## 2022-01-01 RX ADMIN — MINERAL OIL: 1000 LIQUID ORAL at 18:56

## 2022-01-01 RX ADMIN — HYDROCORTISONE SODIUM SUCCINATE 50 MG: 100 INJECTION, POWDER, FOR SOLUTION INTRAMUSCULAR; INTRAVENOUS at 02:48

## 2022-01-01 RX ADMIN — HYDROMORPHONE HYDROCHLORIDE 1 MG: 1 INJECTION, SOLUTION INTRAMUSCULAR; INTRAVENOUS; SUBCUTANEOUS at 03:04

## 2022-01-01 RX ADMIN — AZELASTINE HYDROCHLORIDE 2 SPRAY: 137 SPRAY, METERED NASAL at 20:05

## 2022-01-01 RX ADMIN — Medication 5 MG: at 20:08

## 2022-01-01 RX ADMIN — IPRATROPIUM BROMIDE AND ALBUTEROL SULFATE 3 ML: .5; 3 SOLUTION RESPIRATORY (INHALATION) at 19:56

## 2022-01-01 RX ADMIN — LORAZEPAM 0.25 MG: 0.5 TABLET ORAL at 21:45

## 2022-01-01 RX ADMIN — IPRATROPIUM BROMIDE AND ALBUTEROL SULFATE 3 ML: .5; 3 SOLUTION RESPIRATORY (INHALATION) at 08:37

## 2022-01-01 RX ADMIN — HYDROMORPHONE HYDROCHLORIDE 0.5 MG: 1 INJECTION, SOLUTION INTRAMUSCULAR; INTRAVENOUS; SUBCUTANEOUS at 12:46

## 2022-01-01 RX ADMIN — HALOPERIDOL LACTATE 2 MG: 5 INJECTION, SOLUTION INTRAMUSCULAR at 11:36

## 2022-01-01 RX ADMIN — MORPHINE SULFATE 5 MG: 20 SOLUTION ORAL at 18:25

## 2022-01-01 RX ADMIN — CEFEPIME 2 G: 2 INJECTION, POWDER, FOR SOLUTION INTRAVENOUS at 15:36

## 2022-01-01 RX ADMIN — FUROSEMIDE 20 MG: 10 INJECTION INTRAMUSCULAR; INTRAVENOUS at 17:54

## 2022-01-01 RX ADMIN — Medication 10 ML: at 20:03

## 2022-01-01 RX ADMIN — HALOPERIDOL LACTATE 5 MG: 5 INJECTION, SOLUTION INTRAMUSCULAR at 14:56

## 2022-01-01 RX ADMIN — Medication 1 CAPSULE: at 08:47

## 2022-01-01 RX ADMIN — SENNOSIDES AND DOCUSATE SODIUM 2 TABLET: 50; 8.6 TABLET ORAL at 19:50

## 2022-01-01 RX ADMIN — Medication 5 MG: at 23:20

## 2022-01-01 RX ADMIN — HALOPERIDOL LACTATE 5 MG: 5 INJECTION, SOLUTION INTRAMUSCULAR at 21:20

## 2022-01-01 RX ADMIN — MIDAZOLAM 1 MG: 1 INJECTION, SOLUTION INTRAMUSCULAR; INTRAVENOUS at 08:45

## 2022-01-01 RX ADMIN — POLYVINYL ALCOHOL 2 DROP: 14 SOLUTION/ DROPS OPHTHALMIC at 12:53

## 2022-01-01 RX ADMIN — HALOPERIDOL LACTATE 5 MG: 5 INJECTION, SOLUTION INTRAMUSCULAR at 04:49

## 2022-01-01 RX ADMIN — MIDAZOLAM 1 MG/HR: 5 INJECTION, SOLUTION INTRAMUSCULAR; INTRAVENOUS at 11:48

## 2022-01-01 RX ADMIN — FUROSEMIDE 20 MG: 10 INJECTION INTRAMUSCULAR; INTRAVENOUS at 18:55

## 2022-01-01 RX ADMIN — NYSTATIN 500000 UNITS: 100000 SUSPENSION ORAL at 11:45

## 2022-01-01 RX ADMIN — MINERAL OIL: 1000 LIQUID ORAL at 12:53

## 2022-01-01 RX ADMIN — DOCUSATE SODIUM 100 MG: 100 CAPSULE, LIQUID FILLED ORAL at 20:16

## 2022-01-01 RX ADMIN — HALOPERIDOL LACTATE 2 MG: 5 INJECTION, SOLUTION INTRAMUSCULAR at 11:46

## 2022-01-01 RX ADMIN — HEPARIN SODIUM 5000 UNITS: 5000 INJECTION INTRAVENOUS; SUBCUTANEOUS at 09:25

## 2022-01-01 RX ADMIN — HYDROMORPHONE HYDROCHLORIDE 1 MG: 1 INJECTION, SOLUTION INTRAMUSCULAR; INTRAVENOUS; SUBCUTANEOUS at 06:35

## 2022-01-01 RX ADMIN — POLYVINYL ALCOHOL 2 DROP: 14 SOLUTION/ DROPS OPHTHALMIC at 00:52

## 2022-01-01 RX ADMIN — HYDROCORTISONE SODIUM SUCCINATE 50 MG: 100 INJECTION, POWDER, FOR SOLUTION INTRAMUSCULAR; INTRAVENOUS at 05:41

## 2022-01-01 RX ADMIN — FUROSEMIDE 20 MG: 10 INJECTION INTRAMUSCULAR; INTRAVENOUS at 23:31

## 2022-01-01 RX ADMIN — ALBUMIN HUMAN 12.5 G: 0.25 SOLUTION INTRAVENOUS at 13:00

## 2022-01-01 RX ADMIN — FUROSEMIDE 20 MG: 10 INJECTION INTRAMUSCULAR; INTRAVENOUS at 12:01

## 2022-01-01 RX ADMIN — MIDAZOLAM 1 MG: 1 INJECTION, SOLUTION INTRAMUSCULAR; INTRAVENOUS at 18:56

## 2022-01-01 RX ADMIN — LEVOTHYROXINE SODIUM 137 MCG: 137 TABLET ORAL at 05:31

## 2022-01-01 RX ADMIN — BUDESONIDE AND FORMOTEROL FUMARATE DIHYDRATE 2 PUFF: 80; 4.5 AEROSOL RESPIRATORY (INHALATION) at 19:35

## 2022-01-01 RX ADMIN — VANCOMYCIN HYDROCHLORIDE 1750 MG: 10 INJECTION, POWDER, LYOPHILIZED, FOR SOLUTION INTRAVENOUS at 01:30

## 2022-01-01 RX ADMIN — CITALOPRAM HYDROBROMIDE 20 MG: 20 TABLET ORAL at 23:21

## 2022-01-01 RX ADMIN — HYDROCODONE BITARTRATE AND ACETAMINOPHEN 1 TABLET: 5; 325 TABLET ORAL at 19:24

## 2022-01-01 RX ADMIN — HYDROCORTISONE SODIUM SUCCINATE 50 MG: 100 INJECTION, POWDER, FOR SOLUTION INTRAMUSCULAR; INTRAVENOUS at 05:02

## 2022-01-01 RX ADMIN — IPRATROPIUM BROMIDE AND ALBUTEROL SULFATE 3 ML: .5; 3 SOLUTION RESPIRATORY (INHALATION) at 23:22

## 2022-01-01 RX ADMIN — MIDAZOLAM 1 MG: 1 INJECTION, SOLUTION INTRAMUSCULAR; INTRAVENOUS at 03:20

## 2022-01-01 RX ADMIN — PREDNISONE 5 MG: 5 TABLET ORAL at 08:39

## 2022-01-01 RX ADMIN — HEPARIN SODIUM 5000 UNITS: 5000 INJECTION INTRAVENOUS; SUBCUTANEOUS at 14:22

## 2022-01-01 RX ADMIN — ACETAMINOPHEN 650 MG: 160 SOLUTION ORAL at 12:51

## 2022-01-01 RX ADMIN — HALOPERIDOL LACTATE 2 MG: 5 INJECTION, SOLUTION INTRAMUSCULAR at 17:48

## 2022-01-01 RX ADMIN — METHYLPREDNISOLONE SODIUM SUCCINATE 60 MG: 125 INJECTION, POWDER, FOR SOLUTION INTRAMUSCULAR; INTRAVENOUS at 17:05

## 2022-01-01 RX ADMIN — AZELASTINE HYDROCHLORIDE 2 SPRAY: 137 SPRAY, METERED NASAL at 08:49

## 2022-01-01 RX ADMIN — Medication 10 ML: at 08:07

## 2022-01-01 RX ADMIN — MIDAZOLAM 1 MG: 1 INJECTION, SOLUTION INTRAMUSCULAR; INTRAVENOUS at 11:53

## 2022-01-01 RX ADMIN — POLYVINYL ALCOHOL 2 DROP: 14 SOLUTION/ DROPS OPHTHALMIC at 12:06

## 2022-01-01 RX ADMIN — HALOPERIDOL LACTATE 1 MG: 5 INJECTION, SOLUTION INTRAMUSCULAR at 12:46

## 2022-01-01 RX ADMIN — HYDROMORPHONE HYDROCHLORIDE 0.25 MG: 1 INJECTION, SOLUTION INTRAMUSCULAR; INTRAVENOUS; SUBCUTANEOUS at 14:35

## 2022-01-01 RX ADMIN — HYDRALAZINE HYDROCHLORIDE 25 MG: 25 TABLET, FILM COATED ORAL at 21:59

## 2022-01-01 RX ADMIN — MINERAL OIL: 1000 LIQUID ORAL at 02:56

## 2022-01-01 RX ADMIN — HEPARIN SODIUM 5000 UNITS: 5000 INJECTION INTRAVENOUS; SUBCUTANEOUS at 23:21

## 2022-01-01 RX ADMIN — TAMSULOSIN HYDROCHLORIDE 0.4 MG: 0.4 CAPSULE ORAL at 22:31

## 2022-01-01 RX ADMIN — Medication 10 ML: at 09:32

## 2022-01-01 RX ADMIN — AZELASTINE HYDROCHLORIDE 2 SPRAY: 137 SPRAY, METERED NASAL at 09:17

## 2022-01-01 RX ADMIN — HEPARIN SODIUM 5000 UNITS: 5000 INJECTION INTRAVENOUS; SUBCUTANEOUS at 06:33

## 2022-01-01 RX ADMIN — DIPHENHYDRAMINE HYDROCHLORIDE AND LIDOCAINE HYDROCHLORIDE AND ALUMINUM HYDROXIDE AND MAGNESIUM HYDRO 5 ML: KIT at 09:31

## 2022-01-01 RX ADMIN — HALOPERIDOL LACTATE 2 MG: 5 INJECTION, SOLUTION INTRAMUSCULAR at 12:00

## 2022-01-01 RX ADMIN — HYDROMORPHONE HYDROCHLORIDE 1 MG: 1 INJECTION, SOLUTION INTRAMUSCULAR; INTRAVENOUS; SUBCUTANEOUS at 15:48

## 2022-01-01 RX ADMIN — FUROSEMIDE 20 MG: 10 INJECTION INTRAMUSCULAR; INTRAVENOUS at 05:20

## 2022-01-01 RX ADMIN — LORAZEPAM 0.5 MG: 0.5 TABLET ORAL at 20:16

## 2022-01-01 RX ADMIN — DOCUSATE SODIUM 100 MG: 100 CAPSULE, LIQUID FILLED ORAL at 21:25

## 2022-01-01 RX ADMIN — FUROSEMIDE 20 MG: 10 INJECTION INTRAMUSCULAR; INTRAVENOUS at 12:05

## 2022-01-01 RX ADMIN — QUETIAPINE FUMARATE 25 MG: 25 TABLET ORAL at 09:46

## 2022-01-01 RX ADMIN — MINERAL OIL: 1000 LIQUID ORAL at 05:32

## 2022-01-01 RX ADMIN — FUROSEMIDE 20 MG: 10 INJECTION INTRAMUSCULAR; INTRAVENOUS at 23:27

## 2022-01-01 RX ADMIN — LEVOTHYROXINE SODIUM 137 MCG: 137 TABLET ORAL at 06:33

## 2022-01-01 RX ADMIN — BUDESONIDE AND FORMOTEROL FUMARATE DIHYDRATE 2 PUFF: 80; 4.5 AEROSOL RESPIRATORY (INHALATION) at 20:09

## 2022-01-01 RX ADMIN — MIDAZOLAM 1 MG: 1 INJECTION, SOLUTION INTRAMUSCULAR; INTRAVENOUS at 02:34

## 2022-01-01 RX ADMIN — MIDAZOLAM 1 MG: 1 INJECTION, SOLUTION INTRAMUSCULAR; INTRAVENOUS at 13:17

## 2022-01-01 RX ADMIN — Medication 10 ML: at 01:05

## 2022-01-01 RX ADMIN — MINERAL OIL: 1000 LIQUID ORAL at 00:48

## 2022-01-01 RX ADMIN — POLYVINYL ALCOHOL 2 DROP: 14 SOLUTION/ DROPS OPHTHALMIC at 23:28

## 2022-01-01 RX ADMIN — LEVOTHYROXINE SODIUM 137 MCG: 25 TABLET ORAL at 05:22

## 2022-01-01 RX ADMIN — DIPHENHYDRAMINE HYDROCHLORIDE AND LIDOCAINE HYDROCHLORIDE AND ALUMINUM HYDROXIDE AND MAGNESIUM HYDRO 5 ML: KIT at 19:46

## 2022-01-01 RX ADMIN — CEFEPIME 2 G: 2 INJECTION, POWDER, FOR SOLUTION INTRAVENOUS at 03:53

## 2022-01-01 RX ADMIN — HALOPERIDOL LACTATE 2 MG: 5 INJECTION, SOLUTION INTRAMUSCULAR at 12:52

## 2022-01-01 RX ADMIN — AMLODIPINE BESYLATE 2.5 MG: 2.5 TABLET ORAL at 23:21

## 2022-01-01 RX ADMIN — HYDRALAZINE HYDROCHLORIDE 25 MG: 50 TABLET, FILM COATED ORAL at 21:12

## 2022-01-01 RX ADMIN — QUETIAPINE FUMARATE 25 MG: 25 TABLET ORAL at 09:24

## 2022-01-01 RX ADMIN — HYDRALAZINE HYDROCHLORIDE 25 MG: 25 TABLET, FILM COATED ORAL at 20:05

## 2022-01-01 RX ADMIN — HEPARIN SODIUM 5000 UNITS: 5000 INJECTION INTRAVENOUS; SUBCUTANEOUS at 09:13

## 2022-01-01 RX ADMIN — AZELASTINE HYDROCHLORIDE 2 SPRAY: 137 SPRAY, METERED NASAL at 09:12

## 2022-01-01 RX ADMIN — HYDRALAZINE HYDROCHLORIDE 25 MG: 50 TABLET, FILM COATED ORAL at 08:41

## 2022-01-01 RX ADMIN — HYDROMORPHONE HYDROCHLORIDE 1 MG: 1 INJECTION, SOLUTION INTRAMUSCULAR; INTRAVENOUS; SUBCUTANEOUS at 14:56

## 2022-01-01 RX ADMIN — METHYLPREDNISOLONE SODIUM SUCCINATE 40 MG: 40 INJECTION, POWDER, FOR SOLUTION INTRAMUSCULAR; INTRAVENOUS at 09:36

## 2022-01-01 RX ADMIN — Medication 10 ML: at 20:26

## 2022-01-01 RX ADMIN — HYDROCORTISONE SODIUM SUCCINATE 50 MG: 100 INJECTION, POWDER, FOR SOLUTION INTRAMUSCULAR; INTRAVENOUS at 05:45

## 2022-01-01 RX ADMIN — HYDROMORPHONE HYDROCHLORIDE 1 MG: 1 INJECTION, SOLUTION INTRAMUSCULAR; INTRAVENOUS; SUBCUTANEOUS at 10:37

## 2022-01-01 RX ADMIN — Medication 10 ML: at 21:48

## 2022-01-01 RX ADMIN — LORAZEPAM 0.5 MG: 0.5 TABLET ORAL at 20:26

## 2022-02-01 NOTE — PROGRESS NOTES
Chief Complaint  Anxiety (follow up)    Subjective    Cooper Covarrubias is a 89 y.o. male.     Cooper Covarrubias presents to [facility] for routine follow-up.       History of Present Illness     1. Concerns of his BPH and mixed urgency incontinence.   The patient has an appointment on 12/21/2021 with concerns of his BPH and mixed urinary incontinence, but was also having some dizziness at the time. The patient was instructed to discontinue his oxybutynin and Tamsulosin and is here for follow-up.     The patient reports that he would like to continue the discussion that they had at the previous appointment. He would like to continue as an as is basis; some days he can get up and urinate all by himself, and then there are days he will get up 4 to 5 times a night, but it is not consistent. He states that medication does not work separately. The patient reports that if he is off his Flomax, longer than a few days, he will not stop urinating. He notes that he would like to continue on the oxybutynin 15 mg and Tamsulosin 0.4 mg as is. Denies having dizziness while taking this.     2. Hypertension  He reports that his blood pressure is stable.     3. Anxiety:  The patient notes that his Ativan is working well. He denies panic attacks at this time.     4. Seasonal allergies  The patient is continuing on his current allergy medication.     5. COPD  The patient reports no coughing, congestion, dyspnea on exertion.     He has noticed a dry skin rash on his legs, ankles, and arms; it is itching in nature, which he has been scratching. Over the course of several weeks, he has tried Aveeno lotion for improvement. Patient has also mentioned a nevus or a small papule lesion on the left side of the face on the temporal mandibular region, which has possible changed in size slightly, but has been present over the past month. Denies any trauma.       The following portions of the patient's  history were reviewed and updated as appropriate: allergies, current medications, past family history, past medical history, past social history, past surgical history and problem list.    Review of Systems   All other systems reviewed and are negative.      Objective   Vital Signs:   /62 (BP Location: Right arm, Patient Position: Sitting, Cuff Size: Adult)   Pulse 71   Temp 98.4 °F (36.9 °C) (Temporal)   Resp 20   Wt 86.2 kg (190 lb)   SpO2 98%   BMI 25.77 kg/m²     Body mass index is 25.77 kg/m².    Physical Exam  Vitals reviewed.   Constitutional:       Appearance: Normal appearance.   HENT:      Mouth/Throat:      Mouth: Mucous membranes are moist.   Eyes:      Extraocular Movements: Extraocular movements intact.   Cardiovascular:      Heart sounds: No murmur heard.  No friction rub. No gallop.    Pulmonary:      Breath sounds: No wheezing or rhonchi.      Comments: Clear to auscultation  Skin:     Comments: Patient has approximately 1.5 cm nodular papule lesion with mild telangiectasia appearance. Papular pearly appearance to the lesion on the face.  Patient has bilateral excoriation marks on the thigh and also macular eczematous rash on the feet consistent with a irritated dermatitis.    Neurological:      Mental Status: He is alert.   Psychiatric:      Comments: He is in good spirits, no evidence of distress.                Assessment and Plan  Diagnoses and all orders for this visit:    Spent 35 minutes face-to-face with patient    1. BPH urinary obstruction.   - The patient will go back on the oxybutynin and Flomax.   - He will watch for any types of side effects with these medications.     2. Anxiety  - Patient will continue on lorazepam.     3. Seasonal allergies.   - Patient has been instructed to continue with allergy medications.     4. Dermatitis.   - Patient will be prescribed trimacinolone 0.1 % to apply to skin twice a day and monitor for any change in clinical symptoms of the skin  irritation.     5. COPD  - Patient will continue with prescribed inhalers.     6. Depression  - Patient is clinically doing better.   - Continue with current medical management.     7. Nevus on skin  - Could also be a papular lesion.   - Will refer to dermatology for further evaluation.      Patient consents to using [employer] technology.

## 2022-04-20 NOTE — TELEPHONE ENCOUNTER
Spoke to daughter she stated that patient sees pulmonology in  and they can not get in any sooner. Patient has a neb machine but the vials of medication has . Daughter is wanting to know if you could call some vials in for them until they can get into the pulmonology office.  Patient uses the iprat-albuterol 3ml. I have pended the medication.

## 2022-04-20 NOTE — TELEPHONE ENCOUNTER
DAUGHTER OF PATIENT IS REQUESTING A CALL BACK FROM THE NURSE TO DISCUSS MEDICATION.    CALL BACK NUMBER [phone]

## 2022-04-21 NOTE — TELEPHONE ENCOUNTER
Caller: IRIS MORLEY    Relationship: Emergency Contact    Best call back number: [phone]    What form or medical record are you requesting:  A LETTER STATING WHY PATIENT CAN'T PARTICIPATE IN JURY DUTY DUE TO HEALTH REASON'S    Who is requesting this form or medical record from you: COURT    How would you like to receive the form or medical records (pick-up, mail, fax):     If fax, what is the fax number:  If mail, what is the address:  If pick-up, provide patient with address and location details    Timeframe paperwork needed: ASAP     Additional notes: WILL NEED TO  TOMORROW AFTERNOON

## 2022-04-22 NOTE — TELEPHONE ENCOUNTER
Daughter has been notified that letter was laced up front for . Daughter verb good understanding.

## 2022-05-12 NOTE — PROGRESS NOTES
Chief Complaint  Med Management and Leg Swelling    Subjective    Cooper Covarrubias is a 89 y.o. male.     Cooper Covarrubias presents to [facility] for         History of Present Illness    The following portions of the patient's history were reviewed and updated as appropriate: allergies, current medications, past family history, past medical history, past social history, past surgical history and problem list.    1 low extremity swelling-patient says that he has noticed slight swelling in the lower extremities patient denies any dyspnea on exertion, no shortness of breath, no palpitations, no chest pain, no other systemic symptoms.    2 COPD-continues on current inhaler therapy    3 social engagement- family is wanting to see about assistance with helping dad at home because they have noticed a slight decline in his functional ability and independence.        Review of Systems   All other systems reviewed and are negative.      Objective   Vital Signs:   /82 (BP Location: Right arm, Patient Position: Sitting, Cuff Size: Adult)   Pulse 74   Temp 97.8 °F (36.6 °C) (Temporal)   Resp 20   Wt 89.8 kg (198 lb)   SpO2 99%   BMI 26.85 kg/m²     Body mass index is 26.85 kg/m².    Physical Exam  Vitals and nursing note reviewed.   Constitutional:       Appearance: Normal appearance. He is normal weight.   HENT:      Head: Normocephalic and atraumatic.      Right Ear: Tympanic membrane, ear canal and external ear normal.      Left Ear: Tympanic membrane, ear canal and external ear normal.      Nose: Nose normal.      Mouth/Throat:      Mouth: Mucous membranes are moist.   Eyes:      Pupils: Pupils are equal, round, and reactive to light.   Cardiovascular:      Rate and Rhythm: Normal rate.      Pulses: Normal pulses.   Pulmonary:      Effort: Pulmonary effort is normal.   Abdominal:      General: Bowel sounds are normal.   Musculoskeletal:      Cervical back: Normal  range of motion.   Skin:     Capillary Refill: Capillary refill takes less than 2 seconds.   Neurological:      Mental Status: He is alert.   Psychiatric:         Mood and Affect: Mood normal.         Behavior: Behavior normal.         Thought Content: Thought content normal.               Assessment and Plan  Diagnoses and all orders for this visit:    Diagnoses and all orders for this visit:    Spent approximately 45 minutes face-to-face with patient and family members discussing long-term goals, treatment,    1. Chronic obstructive pulmonary disease, unspecified COPD type (HCC) (Primary)    2. Swelling of lower extremity  -     Adult Transthoracic Echo Complete W/ Cont if Necessary Per Protocol; Future  -     CBC (No Diff); Future  -     Comprehensive Metabolic Panel; Future  -     T4, Free; Future  -     TSH; Future  -     POC Microalbumin  -     TSH  -     T4, Free  -     Comprehensive Metabolic Panel  -     CBC (No Diff)    3. Recurrent major depressive disorder, in full remission ([facility])    4. Anxiety  -     Discontinue: citalopram (CeleXA) 20 MG tablet; Take 1 tablet by mouth Daily.  Dispense: 90 tablet; Refill: 1    5. Localized edema   -     Adult Transthoracic Echo Complete W/ Cont if Necessary Per Protocol; Future    6. BPH with urinary obstruction  -     Ambulatory Referral to Urology    7. Generalized anxiety disorder   -     T4, Free; Future  -     TSH; Future  -     TSH  -     T4, Free    8. Mixed conductive and sensorineural hearing loss of both ears  -     Ambulatory Referral to Audiology          Anticipatory guidance:  Lorazepam decrease to 1/2 tablet po bid x 5 days, then 1/2 tablet po qd x 5 days -taper wean

## 2022-05-19 NOTE — TELEPHONE ENCOUNTER
Patient's daughter Helena states patient is being weaned off Lorazepam and now is having trouble swallowing. She states patient is refusing to eat or drink because he thinks he can't swallow.

## 2022-05-19 NOTE — TELEPHONE ENCOUNTER
"I spoke to daughter Adamaris and addressed her concerns.  We will go ahead and continue at the half a dosage.  Daughters were concerned that he may be having \"withdrawal symptoms \"but patient has been comfortable, no distress, no nausea, no vomiting, no other systemic symptoms.    We will continue with the wean on new dosage and continue to monitor Cooper very closely for any signs of withdrawal.  Reemphasized to family that there is no rush in trying to wean him but monitor his symptoms very closely.  "

## 2022-05-21 PROBLEM — R53.1 GENERALIZED WEAKNESS: Status: ACTIVE | Noted: 2022-01-01

## 2022-05-21 PROBLEM — I10 ESSENTIAL HYPERTENSION: Status: ACTIVE | Noted: 2022-01-01

## 2022-05-21 PROBLEM — D72.819 LEUKOPENIA: Status: ACTIVE | Noted: 2022-01-01

## 2022-05-21 PROBLEM — R53.81 DECLINING FUNCTIONAL STATUS: Status: ACTIVE | Noted: 2022-01-01

## 2022-05-21 PROBLEM — E87.1 HYPONATREMIA: Status: ACTIVE | Noted: 2022-01-01

## 2022-05-21 PROBLEM — D64.9 ANEMIA: Status: ACTIVE | Noted: 2022-01-01

## 2022-05-28 PROBLEM — R53.1 GENERALIZED WEAKNESS: Status: RESOLVED | Noted: 2022-01-01 | Resolved: 2022-01-01

## 2022-05-28 PROBLEM — E87.1 HYPONATREMIA: Status: RESOLVED | Noted: 2022-01-01 | Resolved: 2022-01-01

## 2022-05-28 PROBLEM — D72.819 LEUKOPENIA: Status: RESOLVED | Noted: 2022-01-01 | Resolved: 2022-01-01

## 2022-05-28 NOTE — OUTREACH NOTE
Prep Survey    Flowsheet Row Responses   [facility]   Is LACE score < 7 ? No   Emergency Room discharge w/ pulse ox? No   Eligibility Not Eligible   What are the reasons patient is not eligible? [facility]   Does the patient have one of the following disease processes/diagnoses(primary or secondary)? Other   Prep survey completed? Yes          AVELINA ORTEGA - Registered Nurse

## 2022-05-29 PROBLEM — E87.1 HYPONATREMIA: Status: ACTIVE | Noted: 2022-01-01

## 2022-05-29 PROBLEM — E22.2 SIADH (SYNDROME OF INAPPROPRIATE ADH PRODUCTION): Status: ACTIVE | Noted: 2022-01-01

## 2022-06-01 NOTE — PROGRESS NOTES
"Palliative Care Daily Progress Note     C/C: Patient complains of generalized pain and SOA.    S: Medical record reviewed. Follow up visit for symptom management and GOC in the context of complex medical decision making. Events noted. Daughters at bedside, discussed comfort measures. Assessment of patient with patient alert and oriented x3. States his mouth has sores but has improved with Nystatin. Patient able to drink milk and eat bites off of breakfast tray, states \"tasted good\".     ROS: +pain, generalized, describes as \"burning up\", unable to use pain scale, improves with morphine. +SOA, at rest. Denies anxiety, N/V, constipation, diarrhea.     O: Code Status:   Code Status and Medical Interventions:   Ordered at: 06/01/22 1102     Level Of Support Discussed With:    Next of Kin (If No Surrogate)     Code Status (Patient has no pulse and is not breathing):    No CPR (Do Not Attempt to Resuscitate)     Medical Interventions (Patient has pulse or is breathing):    Comfort Measures     Comments:    Adrien Manriquez and Helena      Advanced Directives: Advance Directive Status: Patient has advance directive, copy in chart   Goals of Care: Ongoing.   Palliative Performance Scale Score:  30%     /92 (BP Location: Right arm, Patient Position: Lying)   Pulse 72   Temp 94.8 °F (34.9 °C) (Axillary)   Resp 18   Ht 182.9 cm (72\")   Wt 87.2 kg (192 lb 4.8 oz)   SpO2 93%   BMI 26.08 kg/m²     Intake/Output Summary (Last 24 hours) at 6/1/2022 1110  Last data filed at 5/31/2022 2005  Gross per 24 hour   Intake --   Output 725 ml   Net -725 ml       PE:  General Appearance:    Patient sitting up in chair, awake, alert, cooperative, NAD, intermittent confusion   HEENT:    NC/AT, EOMI, anicteric, MMM, face relaxed   Neck:   supple, trachea midline, no JVD   Lungs:     CTA bilat, diminished in bases; respirations regular, even and unlabored; RR 20-22 on exam    Heart:    RRR, normal S1 and S2, no M/R/G   Abdomen:     " Normal bowel sounds, soft, non-tender, mildly distended   G/U:   Purewick in place, draining dark urine   MSK/Extremities:   Wasting, no edema   Pulses:   Pulses palpable and equal bilaterally   Skin:   Warm, dry   Neurologic:   A/Ox3, cooperative, RIBEIRO   Psych:   Calm, appropriate     Meds: Reviewed and changes noted    Labs:   Results from last 7 days   Lab Units 05/30/22  0527   WBC 10*3/mm3 8.17   HEMOGLOBIN g/dL 9.0*   HEMATOCRIT % 27.4*   PLATELETS 10*3/mm3 172     Results from last 7 days   Lab Units 05/30/22  0527   SODIUM mmol/L 133*   POTASSIUM mmol/L 3.9   CHLORIDE mmol/L 96*   CO2 mmol/L 29.0   BUN mg/dL 27*   CREATININE mg/dL 1.03   GLUCOSE mg/dL 93   CALCIUM mg/dL 8.6     Results from last 7 days   Lab Units 05/30/22  0527 05/30/22  0011 05/29/22  1828   SODIUM mmol/L 133*   < > 129*   POTASSIUM mmol/L 3.9   < > 3.9   CHLORIDE mmol/L 96*   < > 94*   CO2 mmol/L 29.0   < > 28.0   BUN mg/dL 27*   < > 28*   CREATININE mg/dL 1.03   < > 0.94   CALCIUM mg/dL 8.6   < > 8.4   BILIRUBIN mg/dL  --   --  0.5   ALK PHOS U/L  --   --  107   ALT (SGPT) U/L  --   --  32   AST (SGOT) U/L  --   --  19   GLUCOSE mg/dL 93   < > 123*    < > = values in this interval not displayed.     Imaging Results (Last 72 Hours)     ** No results found for the last 72 hours. **                Diagnostics: Reviewed    A:   Patient Active Problem List   Diagnosis   • Asthma   • Anxiety   • Urinary incontinence   • BPH with urinary obstruction   • Nocturia   • Arthritis   • Vitamin D deficiency   • Chronic obstructive lung disease (HCC)   • Conjunctivitis   • Recurrent major depressive disorder, in full remission ([facility])   • Declining functional status   • Anemia   • Essential hypertension   • Hyponatremia   • SIADH (syndrome of inappropriate ADH production) ([facility])     Cooper Covarrubias is a 89 y.o. male with PMH significant for asthma, anxiety, depression, BPH with urinary obstruction, COPD, chronic anemia, and HTN with UTI, SIADH, hyponatremia,  adrenal insufficiency and urinary retention.     S/S:   1. Debility - comfort measures     2. Pain -generalized, MSK  -scheduled Norco 5-325mg q 8 hours PO  -started Morphine 5mg PO q 4 hours prn pain/dyspnea  -abdominal r/t urinary retention  -anchor FC  -continue Pyridium 200mg PO TID  -oral pain  -continue Nystatin     3. Agitation/Anxiety -increased Lorazepam to 0.5mg PO BID  -started Seroquel 25mg PO nightly prn agitation     4. GOC -DNR/DNI - comfort measures  -daughters Mitra and Helena at bedside elect comfort measures  -discharging home tomorrow with hospice    P: Daughters at bedside. Discussed comfort measures with anchoring FC, continuing ABX as PO, adjustment of medications for comfort. Daughters electing comfort measures with discharge to home with hospice tomorrow. All questions and concerns addressed.   Palliative Care Team will continue to follow patient. Please do not hesitate to contact us regarding further sx mgmt or GOC needs.  Selam Franklin, APRN  6/1/2022  Time spent: 30 minutes

## 2022-06-01 NOTE — PROGRESS NOTES
"   LOS: 0 days    Patient Care Team:  Lb Nielsen MD as PCP - General  Lb Nielsen MD as PCP - Family Medicine  Ulysses Patrick MD as Consulting Physician (Dermatology)  Aislinn Castro MD as Referring Physician (Dermatology)  Ward Sales MD as Consulting Physician (Radiation Oncology)  Juan Viramontes MD as Consulting Physician (Dermatology)  Juan Carlos Hernandez MD as Consulting Physician (Nephrology)  Matt Sosa MD as Consulting Physician (Urology)    Chief Complaint: Difficulty urinating, dysuria    Subjective   89-year-old with urinary obstruction BPH, COPD, depression, chronic anemia, hypertension, recent diagnosis of hyponatremia with SIADH.  Patient has been readmitted within 24 hours after being discharged with obstructive and recurrence of hyponatremia.  Serum sodium 131.  Straight cath in the ER showed 900 mL residual urine.  On a previous admission patient received Tolvapton 1 dose for hyponatremia.  Interval History:   No new labs. Now has chandler cath. Plan for home hospice     Review of Systems:   Patient denies shortness of breath, chest pain, dysuria, hematuria, nausea, vomiting.      Objective     Vital Sign Min/Max for last 24 hours  Temp  Min: 94.8 °F (34.9 °C)  Max: 97.6 °F (36.4 °C)   BP  Min: 109/70  Max: 164/84   Pulse  Min: 49  Max: 94   Resp  Min: 18  Max: 18   SpO2  Min: 93 %  Max: 95 %   No data recorded   Weight  Min: 87.2 kg (192 lb 4.8 oz)  Max: 87.2 kg (192 lb 4.8 oz)     Flowsheet Rows    Flowsheet Row First Filed Value   Admission Height 182.9 cm (72\") Documented at 05/29/2022 1748   Admission Weight 85.7 kg (189 lb) Documented at 05/29/2022 1748          I/O this shift:  In: 118 [P.O.:118]  Out: -   I/O last 3 completed shifts:  In: -   Out: 1775 [Urine:1775]    Physical Exam:  General Appearance: Alert, oriented, no obvious distress.  Eyes: PER, EOMI.  Neck: Supple no JVD.  Lungs: Clear auscultation, no rales rhonchi's, equal chest movement, " nonlabored.  Heart: No gallop, murmur, rub, RRR.  Abdomen: Soft, nontender, positive bowel sounds, no organomegaly.  Extremities: Positive bilateral lower extremity edema, no cyanosis.  Neuro: No focal deficit, moving all extremities, alert oriented X 3      WBC WBC   Date Value Ref Range Status   05/30/2022 8.17 3.40 - 10.80 10*3/mm3 Final   05/29/2022 10.03 3.40 - 10.80 10*3/mm3 Final      HGB Hemoglobin   Date Value Ref Range Status   05/30/2022 9.0 (L) 13.0 - 17.7 g/dL Final   05/29/2022 9.2 (L) 13.0 - 17.7 g/dL Final      HCT Hematocrit   Date Value Ref Range Status   05/30/2022 27.4 (L) 37.5 - 51.0 % Final   05/29/2022 28.4 (L) 37.5 - 51.0 % Final      Platlets No results found for: LABPLAT   MCV MCV   Date Value Ref Range Status   05/30/2022 75.9 (L) 79.0 - 97.0 fL Final   05/29/2022 76.3 (L) 79.0 - 97.0 fL Final          Sodium Sodium   Date Value Ref Range Status   05/30/2022 133 (L) 136 - 145 mmol/L Final   05/30/2022 131 (L) 136 - 145 mmol/L Final   05/29/2022 129 (L) 136 - 145 mmol/L Final      Potassium Potassium   Date Value Ref Range Status   05/30/2022 3.9 3.5 - 5.2 mmol/L Final   05/30/2022 3.9 3.5 - 5.2 mmol/L Final     Comment:     Slight hemolysis detected by analyzer. Results may be affected.   05/29/2022 3.9 3.5 - 5.2 mmol/L Final      Chloride Chloride   Date Value Ref Range Status   05/30/2022 96 (L) 98 - 107 mmol/L Final   05/30/2022 95 (L) 98 - 107 mmol/L Final   05/29/2022 94 (L) 98 - 107 mmol/L Final      CO2 CO2   Date Value Ref Range Status   05/30/2022 29.0 22.0 - 29.0 mmol/L Final   05/30/2022 27.0 22.0 - 29.0 mmol/L Final   05/29/2022 28.0 22.0 - 29.0 mmol/L Final      BUN BUN   Date Value Ref Range Status   05/30/2022 27 (H) 8 - 23 mg/dL Final   05/30/2022 26 (H) 8 - 23 mg/dL Final   05/29/2022 28 (H) 8 - 23 mg/dL Final      Creatinine Creatinine   Date Value Ref Range Status   05/30/2022 1.03 0.76 - 1.27 mg/dL Final   05/30/2022 0.95 0.76 - 1.27 mg/dL Final   05/29/2022 0.94 0.76 -  1.27 mg/dL Final      Calcium Calcium   Date Value Ref Range Status   05/30/2022 8.6 8.2 - 9.6 mg/dL Final   05/30/2022 8.3 8.2 - 9.6 mg/dL Final   05/29/2022 8.4 8.2 - 9.6 mg/dL Final      PO4 No results found for: CAPO4   Albumin Albumin   Date Value Ref Range Status   05/29/2022 2.90 (L) 3.50 - 5.20 g/dL Final      Magnesium No results found for: MG   Uric Acid No results found for: URICACID        Results Review:     I reviewed the patient's new clinical results.    azelastine, 2 spray, Each Nare, BID  cefuroxime, 500 mg, Oral, Q12H  hydrALAZINE, 25 mg, Oral, Q12H  HYDROcodone-acetaminophen, 1 tablet, Oral, Q8H  lactobacillus acidophilus, 1 capsule, Oral, Daily  levothyroxine, 137 mcg, Oral, Q AM  LORazepam, 0.5 mg, Oral, Q12H  nystatin, 5 mL, Swish & Spit, 4x Daily  phenazopyridine, 200 mg, Oral, TID With Meals  [START ON 6/3/2022] predniSONE, 2.5 mg, Oral, BID With Meals  predniSONE, 5 mg, Oral, BID With Meals  sodium chloride, 10 mL, Intravenous, Q12H  tamsulosin, 0.4 mg, Oral, Nightly           Medication Review: Reviewed    Assessment & Plan       Asthma    Anxiety    BPH with urinary obstruction    Chronic obstructive lung disease (HCC)    Recurrent major depressive disorder, in full remission ([facility])    Declining functional status    Anemia    Essential hypertension    Hyponatremia    SIADH (syndrome of inappropriate ADH production) ([facility])       1. Hyponatremia: Improving 133. On arrival sodium 131. Just discharged yesterday from [facility] for the same, at time of DC sodium 129. Last admission, hyponatremia corrected with tolvaptan 7.5mg x one dose followed by fluid restriction. TSAH level was elevated with low FT$ which oculd be contributory. + edema.  Celexa was held.  Urine osmolality 360, serum osmolarity 282, urine sodium 88, waiting for other labs  2. Urinary retention: Recurrent. BPH with obstruction. Plans for bladder scan with straight cath as needed q4h. On tamulosin  3. Anemia: hgb 9.0, TSAT 16% last  admission.   4. Thrush: Likely secondary to advair.   5. UTI: UA + for nitrites. Getting empiric rocephin. Culture pending  6. Peripheral edema     Plan  Continue fluid restriction 1L/day. Add protein supplementation 3x daily.   Given plan for discharge home with home hospice will hold off on any treatment with reggie Zayas MD  06/01/22  12:44 EDT

## 2022-06-01 NOTE — PLAN OF CARE
Goal Outcome Evaluation:   Pt is alert to self, able to ask for help, but confused. Family met with palliative care and decided to make pt comfort care only. Possible Hospice when he goes home. Transport set up for 1230 Friday. Daughters at bedside and notified of plan. They stated that they would need to talk to other family member to see if that will be ok due to delivery time for hospital bed to the home. New orders to make pt comfort care, discontinue labs and telemetry. Pt is anxious and fearful when alone. Has c/o burning with urination consistent with known UTI. He has new orders for scheduled ativan 0.5mg seroquel prn at hs and morphine concentrate 5mg q 3 hrs prn.

## 2022-06-01 NOTE — NURSING NOTE
"Attempted to put chandler catheter in x 2 but when pt saw the kit, he stated that he could not \"stand the pain of that catheter\" and that he was \"burning up\" indicating with gestures that his genital area was burning. The purwick is in place draining orange tinged urine- color is consistent with pyridium side effects.     "

## 2022-06-01 NOTE — THERAPY TREATMENT NOTE
Patient Name: Cooper Covarrubias  : 1931    MRN: [id number]                              Today's Date: 2022       Admit Date: 2022    Visit Dx:     ICD-10-CM ICD-9-CM   1. SIADH (syndrome of inappropriate ADH production) (Formerly [facility])  E22.2 253.6   2. Hyponatremia  E87.1 276.1   3. Acute UTI  N39.0 599.0     Patient Active Problem List   Diagnosis   • Asthma   • Anxiety   • Urinary incontinence   • BPH with urinary obstruction   • Nocturia   • Arthritis   • Vitamin D deficiency   • Chronic obstructive lung disease (Formerly [facility])   • Conjunctivitis   • Recurrent major depressive disorder, in full remission ([facility])   • Declining functional status   • Anemia   • Essential hypertension   • Hyponatremia   • SIADH (syndrome of inappropriate ADH production) ([facility])     Past Medical History:   Diagnosis Date   • Anxiety    • Arthritis    • Basal cell carcinoma    • Cataract    • COPD (chronic obstructive pulmonary disease) ([facility])    • Hypertension    • Urinary incontinence    • Visual impairment      Past Surgical History:   Procedure Laterality Date   • CATARACT EXTRACTION     • SKIN CANCER EXCISION      , , , 1001-2926      General Information     Row Name 22 1106          OT Time and Intention    Document Type therapy note (daily note)  -MR     Mode of Treatment occupational therapy  -MR     Row Name 22 1106          General Information    Patient Profile Reviewed yes  -MR     Existing Precautions/Restrictions fall  -MR     Barriers to Rehab medically complex;cognitive status  -MR     Row Name 22 1106          Cognition    Orientation Status (Cognition) oriented to;person;situation;verbal cues/prompts needed for orientation;place;time  -MR     Row Name 22 1106          Safety Issues, Functional Mobility    Safety Issues Affecting Function (Mobility) at risk behavior observed;awareness of need for assistance;insight into deficits/self-awareness;safety precaution awareness;safety precautions  follow-through/compliance;sequencing abilities  -MR     Impairments Affecting Function (Mobility) balance;cognition;endurance/activity tolerance;coordination;pain;strength  -MR     Cognitive Impairments, Mobility Safety/Performance awareness, need for assistance;insight into deficits/self-awareness;safety precaution awareness;safety precaution follow-through;sequencing abilities  -MR           User Key  (r) = Recorded By, (t) = Taken By, (c) = Cosigned By    Initials Name Provider Type    MR Reynaldo Pendletonelle, OT Occupational Therapist                 Mobility/ADL's     Row Name 06/01/22 1106          Bed Mobility    Bed Mobility supine-sit  -MR     Supine-Sit Grethel (Bed Mobility) minimum assist (75% patient effort);verbal cues;nonverbal cues (demo/gesture)  -MR     Bed Mobility, Safety Issues decreased use of legs for bridging/pushing;decreased use of arms for pushing/pulling  -MR     Assistive Device (Bed Mobility) head of bed elevated  -MR     Comment, (Bed Mobility) extended time and verbal cues required for encouragement, sequencing and hand placement.  -MR     Row Name 06/01/22 1106          Transfers    Transfers sit-stand transfer;bed-chair transfer  -MR     Comment, (Transfers) v/c and t/c for sequencing, safety and hand placement.  -MR     Bed-Chair Grethel (Transfers) minimum assist (75% patient effort);verbal cues;nonverbal cues (demo/gesture)  -MR     Assistive Device (Bed-Chair Transfers) other (see comments)  utilizing bed rails and chair arm rests for transfer  -MR     Sit-Stand Grethel (Transfers) contact guard;verbal cues  -MR     Row Name 06/01/22 1106          Sit-Stand Transfer    Assistive Device (Sit-Stand Transfers) other (see comments)  BUE support  -MR     Row Name 06/01/22 1106          Functional Mobility    Functional Mobility- Comment Pt deferred mobility d/t hurting all over and feeling he is having episodes of incontinence. Male extrenal catheter in place and no BM noted   -MR     Row Name 06/01/22 1106          Activities of Daily Living    BADL Assessment/Intervention lower body dressing;grooming;feeding  -MR     Row Name 06/01/22 1106          Lower Body Dressing Assessment/Training    [place] Level (Lower Body Dressing) don;socks;dependent (less than 25% patient effort)  -MR     Position (Lower Body Dressing) supine  -MR     Row Name 06/01/22 1106          Grooming Assessment/Training    [place] Level (Grooming) wash face, hands;hair care, combing/brushing;standby assist  -MR     Position (Grooming) supported sitting  -MR     Row Name 06/01/22 1106          Self-Feeding Assessment/Training    [place] Level (Feeding) feeding skills;set up  -MR     Position (Self-Feeding) supported sitting  -MR     Comment, (Feeding) Initially pt requiring MaxA but progressed to SUA w/ cueing and encouragement to complete task independently.  -MR           User Key  (r) = Recorded By, (t) = Taken By, (c) = Cosigned By    Initials Name Provider Type    Dali Valdes, WENDY Occupational Therapist               Obj/Interventions     Row Name 06/01/22 1110          Balance    Balance Assessment sitting static balance;sitting dynamic balance;standing static balance;standing dynamic balance  -MR     Static Sitting Balance supervision  -MR     Dynamic Sitting Balance contact guard  -MR     Position, Sitting Balance unsupported;sitting edge of bed  -MR     Static Standing Balance contact guard  -MR     Dynamic Standing Balance minimal assist  -MR     Position/Device Used, Standing Balance supported  -MR     Balance Interventions sitting;standing;sit to stand;supported;static;dynamic;minimal challenge;occupation based/functional task  -MR     Comment, Balance No LOB noted. Pt appearing unsteady on his feet during transfer.  -MR           User Key  (r) = Recorded By, (t) = Taken By, (c) = Cosigned By    Initials Name Provider Type    Dali Valdes, WENDY Occupational Therapist                Goals/Plan    No documentation.                Clinical Impression     Row Name 06/01/22 1111          Pain Assessment    Pretreatment Pain Rating 5/10  -MR     Posttreatment Pain Rating 5/10  -MR     Pain Location generalized  -MR     Pain Location - other (see comments)  all over  -MR     Pre/Posttreatment Pain Comment RN aware and managing  -MR     Pain Intervention(s) Ambulation/increased activity;Repositioned  -MR     Row Name 06/01/22 1111          Plan of Care Review    Plan of Care Reviewed With patient  -MR     Progress improving  -MR     Outcome Evaluation Pt completed bed mobility w/ CGA and v/c for encouragement, sequencing and safety. Carlie for bed > chair transfer w/ use of bed rails and chair arm rests for support. CGA for STS from chair to upright. SUA for self-feeding w/ encouragement to complete the task independently. DEP for LB dressing. Continue to progress per current POC. Continue to recommend SNF upon d/c.  -MR     Row Name 06/01/22 1111          Therapy Plan Review/Discharge Plan (OT)    Anticipated Discharge Disposition (OT) skilled nursing facility;other (see comments)  Rehab  -MR     Row Name 06/01/22 1111          Vital Signs    Pre Systolic BP Rehab --  VSS. Cleared by RN for treatment.  -MR     O2 Delivery Pre Treatment room air  -MR     O2 Delivery Intra Treatment room air  -MR     O2 Delivery Post Treatment room air  -MR     Pre Patient Position Supine  -MR     Intra Patient Position Standing  -MR     Post Patient Position Sitting  -MR     Row Name 06/01/22 1111          Positioning and Restraints    Pre-Treatment Position in bed  -MR     Post Treatment Position chair  -MR     In Chair notified nsg;reclined;sitting;call light within reach;encouraged to call for assist;exit alarm on;with family/caregiver;legs elevated;waffle cushion  -MR           User Key  (r) = Recorded By, (t) = Taken By, (c) = Cosigned By    Initials Name Provider Type    Dali Valdes, OT Occupational  Therapist               Outcome Measures     Row Name 06/01/22 1117          How much help from another is currently needed...    Putting on and taking off regular lower body clothing? 1  -MR     Bathing (including washing, rinsing, and drying) 2  -MR     Toileting (which includes using toilet bed pan or urinal) 2  -MR     Putting on and taking off regular upper body clothing 2  -MR     Taking care of personal grooming (such as brushing teeth) 3  -MR     Eating meals 4  -MR     AM-PAC 6 Clicks Score (OT) 14  -MR     Row Name 06/01/22 1117          Functional Assessment    Outcome Measure Options AM-PAC 6 Clicks Daily Activity (OT)  -MR           User Key  (r) = Recorded By, (t) = Taken By, (c) = Cosigned By    Initials Name Provider Type    Dali Valdes, OT Occupational Therapist                Occupational Therapy Education                 Title: PT OT SLP Therapies (In Progress)     Topic: Occupational Therapy (In Progress)     Point: ADL training (Done)     Description:   Instruct learner(s) on proper safety adaptation and remediation techniques during self care or transfers.   Instruct in proper use of assistive devices.              Learning Progress Summary           Patient Acceptance, E, VU,NR by MR at 6/1/2022 1118    Acceptance, E, VU,NR by TA at 5/30/2022 1058   Family Acceptance, E, VU,NR by TA at 5/30/2022 1058                   Point: Home exercise program (Not Started)     Description:   Instruct learner(s) on appropriate technique for monitoring, assisting and/or progressing therapeutic exercises/activities.              Learner Progress:  Not documented in this visit.          Point: Precautions (Done)     Description:   Instruct learner(s) on prescribed precautions during self-care and functional transfers.              Learning Progress Summary           Patient Acceptance, E, VU,NR by MR at 6/1/2022 1118    Acceptance, E, VU,NR by TA at 5/30/2022 1058   Family Acceptance, E, VU,NR by TA at  5/30/2022 1058                   Point: Body mechanics (Done)     Description:   Instruct learner(s) on proper positioning and spine alignment during self-care, functional mobility activities and/or exercises.              Learning Progress Summary           Patient Acceptance, E, VU,NR by MR at 6/1/2022 1118    Acceptance, E, VU,NR by TA at 5/30/2022 1058   Family Acceptance, E, VU,NR by TA at 5/30/2022 1058                               User Key     Initials Effective Dates Name Provider Type Discipline    TA 06/16/21 -  Jone Torres OT Occupational Therapist OT     10/06/21 -  Dali Pendleton OT Occupational Therapist OT              OT Recommendation and Plan     Plan of Care Review  Plan of Care Reviewed With: patient  Progress: improving  Outcome Evaluation: Pt completed bed mobility w/ CGA and v/c for encouragement, sequencing and safety. Carlie for bed > chair transfer w/ use of bed rails and chair arm rests for support. CGA for STS from chair to upright. SUA for self-feeding w/ encouragement to complete the task independently. DEP for LB dressing. Continue to progress per current POC. Continue to recommend SNF upon d/c.     Time Calculation:    Time Calculation- OT     Row Name 06/01/22 1118             Time Calculation- OT    OT Start Time 0959  -MR      OT Received On 06/01/22  -MR      OT Goal Re-Cert Due Date 06/09/22  -MR              Timed Charges    81077 - OT Therapeutic Activity Minutes 12  -MR      49646 - OT Self Care/Mgmt Minutes 11  -MR              Total Minutes    Timed Charges Total Minutes 23  -MR       Total Minutes 23  -MR            User Key  (r) = Recorded By, (t) = Taken By, (c) = Cosigned By    Initials Name Provider Type     Dali Pendleton OT Occupational Therapist              Therapy Charges for Today     Code Description Service Date Service Provider Modifiers Qty    02245756846  OT THERAPEUTIC ACT EA 15 MIN 6/1/2022 Dali Pendleton OT GO 1    [id number]  OT SELF  CARE/MGMT/TRAIN EA 15 MIN 6/1/2022 Francisco Pendleton, OT GO 1               FRANCISCO PENDLETON OT  6/1/2022

## 2022-06-01 NOTE — PLAN OF CARE
Goal Outcome Evaluation:  Plan of Care Reviewed With: patient, daughter (two daughters present; Jane Dunbar)        Progress: no change  Outcome Evaluation: Palliative consult for GOC/ACP, hospice referral/discussion. Pt has ACP doc on file, with designated HCS. Pt c/o lower abdominal pain, appears relieved with Pyridium and intermittently with I/O cath for urinary retention. Daughters Bettina and Jane present today; changed to comfort measures. Moody catheter, complete current course of antibiotics for UTI, plan home with hospice tomorrow.    1330 Palliative IDT meeting: ALONZO Rodriguez RN, PN; EYAD Franklin, APRN; COLE Erickson, ; KESHIA Voss RN, PN; SVITLANA Nails RN; SARAH Black, [facility], [facility]; DAVID Treadwell RN, PN; DAVID Paz MDiv, [facility]; EYAD Becerril RN; SVITLANA Milan, W    Problem: Palliative Care  Goal: Enhanced Quality of Life  Outcome: Ongoing, Progressing  Intervention: Promote Advance Care Planning  Flowsheets (Taken 6/1/2022 1623)  Life Transition/Adjustment: (Hospice consult)   palliative care discussed   palliative care initiated   other (see comments)  Intervention: Maximize Comfort  Flowsheets (Taken 6/1/2022 1623)  Pain Management Interventions: pain management plan reviewed with patient/caregiver  Intervention: Optimize Function  Flowsheets (Taken 6/1/2022 1623)  Sensory Stimulation Regulation: quiet environment promoted  Fatigue Management: paced activity encouraged  Sleep/Rest Enhancement: natural light exposure provided  Intervention: Optimize Psychosocial Wellbeing  Flowsheets (Taken 6/1/2022 1623)  Spiritual Activities Assistance: (Spiritual Care consult) other (see comments)  Family/Support System Care:   caregiver stress acknowledged   family care conference arranged   involvement promoted   presence promoted   self-care encouraged   support provided

## 2022-06-01 NOTE — PROGRESS NOTES
Continued Stay Note  [facility]     Patient Name: Cooper Covarrubias  MRN: [id number]  Today's Date: 6/1/2022    Admit Date: 5/29/2022     Discharge Plan     Row Name 06/01/22 1018       Plan    Plan Home with [facility]    Patient/Family in Agreement with Plan yes    Plan Comments Call from the pt.'s daughters, Adamaris & Mitra, stating that the pt is uncomfortable & that they wanted to know what could be done to intervene & make the pt more comfortable. Writer did explain that making the pt comfort measure only would allow the Palliative Care to do adjustments that will make the pt more comfortable. Writer explained that we are working to transition the pt home & when the ambulance is arranged she will update the family. Family agreeable. Call placed a call to [facility], to ask her to go & assess the pt. [facility] agreeable. Hospice will continue to follow. If further assistance is needed, please call [phone].               Discharge Codes    No documentation.               Expected Discharge Date and Time     Expected Discharge Date Expected Discharge Time    Mahad 3, 2022             Pallavi Milan

## 2022-06-01 NOTE — PROGRESS NOTES
[facility] Medicine Services  PROGRESS NOTE    Patient Name: Cooper Covarrubias  : 1931  MRN: [id number]    Date of Admission: 2022  Primary Care Physician: Lb Nielsen MD    Subjective   Subjective     CC:  F/U Hyponatremia, UTI     HPI:  Patient seen and examined. RN notes reviewed. Pt got agitated last night, confused some this morning. Seems to think he's sitting in stool but he's not. Family at bedside, discussed comfort measures. Patient states he has burning in his penis up to his abdomen.     ROS:  Gen- No fevers, chills  CV- No chest pain, palpitations  Resp- No cough, dyspnea  GI- No N/V/D, abd pain (per  HPI)     Objective   Objective     Vital Signs:   Temp:  [94.8 °F (34.9 °C)-97.6 °F (36.4 °C)] 95.7 °F (35.4 °C)  Heart Rate:  [49-94] 88  Resp:  [18] 18  BP: (109-164)/(70-96) 109/70     Physical Exam:  Constitutional: No acute distress, awake, alert  HENT: NCAT, mucous membranes moist  Respiratory: Clear to auscultation bilaterally, respiratory effort normal   Cardiovascular: RRR, no murmurs, rubs, or gallops  Gastrointestinal: Positive bowel sounds, soft, nontender, nondistended  Musculoskeletal: No bilateral ankle edema   Psychiatric: Flat affect, cooperative  Neurologic: No focal deficits   Skin: No rashes, scarring on face from prior skin cancer removal     Results Reviewed:  LAB RESULTS:      Lab 22  0838   WBC 8.17 10.03 14.44*   HEMOGLOBIN 9.0* 9.2* 8.5*   HEMATOCRIT 27.4* 28.4* 26.2*   PLATELETS 172 166 100*   NEUTROS ABS 7.08* 9.08*  --    IMMATURE GRANS (ABS) 0.09* 0.11*  --    LYMPHS ABS 0.47* 0.31*  --    MONOS ABS 0.48 0.50  --    EOS ABS 0.02 0.02  --    MCV 75.9* 76.3* 77.1*         Lab 22  0011 2222  0838   SODIUM 133* 131* 129* 139   POTASSIUM 3.9 3.9 3.9 3.8   CHLORIDE 96* 95* 94* 102   CO2 29.0 27.0 28.0 29.0   ANION GAP 8.0 9.0 7.0 8.0   BUN 27* 26* 28* 33*   CREATININE  1.03 0.95 0.94 1.18   EGFR 69.0 76.0 77.0 58.6*   GLUCOSE 93 124* 123* 82   CALCIUM 8.6 8.3 8.4 8.8   PHOSPHORUS  --   --   --  2.7         Lab 05/29/22  1828 05/28/22  0838   TOTAL PROTEIN 5.9*  --    ALBUMIN 2.90* 2.80*   GLOBULIN 3.0  --    ALT (SGPT) 32  --    AST (SGOT) 19  --    BILIRUBIN 0.5  --    ALK PHOS 107  --                      Brief Urine Lab Results  (Last result in the past 365 days)      Color   Clarity   Blood   Leuk Est   Nitrite   Protein   CREAT   Urine HCG        05/29/22 1941 Yellow   Clear   Negative   Small (1+)   Positive   Negative                 Microbiology Results Abnormal     Procedure Component Value - Date/Time    COVID PRE-OP / PRE-PROCEDURE SCREENING ORDER (NO ISOLATION) - Swab, Nasopharynx [[id number]]  (Normal) Collected: 05/29/22 2131    Lab Status: Final result Specimen: Swab from Nasopharynx Updated: 05/29/22 2209    Narrative:      The following orders were created for panel order COVID PRE-OP / PRE-PROCEDURE SCREENING ORDER (NO ISOLATION) - Swab, Nasopharynx.  Procedure                               Abnormality         Status                     ---------                               -----------         ------                     COVID-19 and FLU A/B PCR...[[id number]]  Normal              Final result                 Please view results for these tests on the individual orders.    COVID-19 and FLU A/B PCR - Swab, Nasopharynx [[id number]]  (Normal) Collected: 05/29/22 2131    Lab Status: Final result Specimen: Swab from Nasopharynx Updated: 05/29/22 2209     COVID19 Not Detected     Influenza A PCR Not Detected     Influenza B PCR Not Detected    Narrative:      Fact sheet for providers: https://www.fda.gov/media/209855/download    Fact sheet for patients: https://www.fda.gov/media/239402/download    Test performed by PCR.          No radiology results from the last 24 hrs        I have reviewed the medications:  Scheduled Meds:azelastine, 2 spray, Each Nare,  BID  cefuroxime, 500 mg, Oral, Q12H  hydrALAZINE, 25 mg, Oral, Q12H  HYDROcodone-acetaminophen, 1 tablet, Oral, Q8H  lactobacillus acidophilus, 1 capsule, Oral, Daily  levothyroxine, 137 mcg, Oral, Q AM  LORazepam, 0.5 mg, Oral, Q12H  nystatin, 5 mL, Swish & Spit, 4x Daily  phenazopyridine, 200 mg, Oral, TID With Meals  [START ON 6/3/2022] predniSONE, 2.5 mg, Oral, BID With Meals  predniSONE, 5 mg, Oral, BID With Meals  sodium chloride, 10 mL, Intravenous, Q12H  tamsulosin, 0.4 mg, Oral, Nightly      Continuous Infusions:   PRN Meds:.•  acetaminophen **OR** acetaminophen **OR** acetaminophen  •  albuterol  •  artificial tears  •  ipratropium-albuterol  •  loperamide  •  melatonin  •  morphine  •  ondansetron  •  QUEtiapine  •  sodium chloride    Assessment & Plan   Assessment & Plan     Active Hospital Problems    Diagnosis  POA   • Hyponatremia [E87.1]  Yes   • SIADH (syndrome of inappropriate ADH production) ([facility]) [E22.2]  Yes   • Declining functional status [R53.81]  Yes   • Essential hypertension [I10]  Yes   • Anemia [D64.9]  Yes   • Recurrent major depressive disorder, in full remission ([facility]) [F33.42]  Yes   • Asthma [J45.909]  Yes   • Anxiety [F41.9]  Yes   • BPH with urinary obstruction [N40.1, N13.8]  Yes   • Chronic obstructive lung disease ([facility]) [J44.9]  Yes      Resolved Hospital Problems   No resolved problems to display.        Brief Hospital Course to date:  Cooper Covarrubias is a 89 y.o. male with past medical history of asthma, anxiety, BPH with urinary obstruction, COPD, depression, chronic anemia, essential hypertension, recent admission with discharged earlier today for SIADH with hyponatremia who returns from [facility] rehab for severe dysuria, difficulty urinating, and recurrence and hyponatremia.    This patient's problems and plans were partially entered by my partner and updated as appropriate by me 06/01/22.    Dysuria  UTI, Complicated   -Urine Cx + Citrobacter freundii and Klebsiella  pneumoniae, sensitive to Rocephin. Since going comfort measures, will place on PO Ceftin.     Relative Adrenal Insufficiency   Hypothermia/Bradycardia  Hypothyroidism:  - His cortisol borderline low (only 8) and also with lab abnormalities c/w hypothyroidism. He was treated with IV hydrocortisone and synthroid with improvement in vitals during last admission.    -Remains on steroid taper   - Recheck TSH in 4 weeks.     Hyponatremia  SIADH  - His urine studies seem consistent with SIADH, low serum osm, high urin osm, urine sodium >20 last admision  - s/p Tolvaptan 5/24 (last admission) with normalization of sodium  - Pt now comfort measures, will DC fluid restriction     COPD without acute exacerbation    Chronic Anemia     Oral Thrush   -likely secondary to advair, temporarily held.  Needs instructions to rinse mouth after use. Continue magic mouthwash.     Urinary Retention  BPH  -Required FC last admission and then transitioned to straight cath   -Bladder scan q4h   -Pyridium   -Continue Flomax   -Dr Yeh with Urology has seen   -Palliative offered FC for comfort     Anxiety  -Continue reduced dose of Benzo, Ativan BID     Hypothyroidism  -Continue Synthroid     Comfort measures now. Home with hospice tomorrow. DC tele.     DVT prophylaxis:  Mechanical DVT prophylaxis orders are present.       AM-PAC 6 Clicks Score (PT): 17 (05/30/22 1335)    Disposition: I expect the patient to be discharged home tomorrow with hospice services.     CODE STATUS:   Code Status and Medical Interventions:   Ordered at: 06/01/22 1102     Level Of Support Discussed With:    Next of Kin (If No Surrogate)     Code Status (Patient has no pulse and is not breathing):    No CPR (Do Not Attempt to Resuscitate)     Medical Interventions (Patient has pulse or is breathing):    Comfort Measures     Comments:    Adrien Cheng, DO  06/01/22

## 2022-06-01 NOTE — PLAN OF CARE
Goal Outcome Evaluation:  Plan of Care Reviewed With: patient        Progress: improving  Outcome Evaluation: Pt completed bed mobility w/ CGA and v/c for encouragement, sequencing and safety. Carlie for bed > chair transfer w/ use of bed rails and chair arm rests for support. CGA for STS from chair to upright. SUA for self-feeding w/ encouragement to complete the task independently. DEP for LB dressing. Continue to progress per current POC. Continue to recommend SNF upon d/c.

## 2022-06-01 NOTE — NURSING NOTE
Pt family came to nurses station and stated that the patient had had a BM and had not been changed for two hours. This nurse had been in the room 3 times this shift and did not see or smell any sign of BM. When techs went to change patient, he was clean and dry. He was also continually stating he was having a BM. Pt is confused and not oriented.

## 2022-06-01 NOTE — NURSING NOTE
Bladder scan completed per family request. No residual urine noted. Pt purewick has 350ml in the container.

## 2022-06-01 NOTE — DISCHARGE INSTR - OTHER ORDERS
Please remember to call Hospice when your family member arrives home so that the hospice nurse can come to complete the admission process. Thank you.

## 2022-06-01 NOTE — PLAN OF CARE
"Goal Outcome Evaluation:  Plan of Care Reviewed With: patient        Progress: declining  Outcome Evaluation: Patient anxious and irritable at beginning of shift. States the staff are stupid and are not listening to him, that he is in \"a lot of pain\" and that \"someone needs to come get him before he passes away\". Daughter, Helena, called and notified of situation. Helena spoke with patient and he agreed to take HS meds, including scheduled PO ativan, PRN morphine, and PRN melatonin. Pt slept for majority of shift. This AM, patient is awake but is quiet and withdrawn. Pt with good UOP this shift. Bladder scans less than 400mL each time. VSS.  "

## 2022-06-01 NOTE — PROGRESS NOTES
Continued Stay Note  [facility]     Patient Name: Cooper Covarrubias  MRN: [id number]  Today's Date: 6/1/2022    Admit Date: 5/29/2022     Discharge Plan     Row Name 06/01/22 8487       Plan    Plan Home w/ [facility]    Patient/Family in Agreement with Plan yes    Plan Comments F/u visit made. Pt was up in the chair w/ family @ BS. Family remains in agreement to DNR/comfort measures only. Writer did explain that the request for the ambulance request has been sent & that writer is awaiting a day & time. Family is aware that hospice DME will be delivered after 1200 tomorrow. Additional hospice related questions/concerns have been answered/addressed. Call from [facility], they can transport the pt on Friday @ 1230. Pt.'s family & care team have been updated. EMS DNR & PCS has been placed on the pt.'s chart. Hospice will continue to follow. If further assistance is needed, please call [phone].    Row Name 06/01/22 1142                                            Discharge Codes    No documentation.               Expected Discharge Date and Time     Expected Discharge Date Expected Discharge Time    Mahad 3, 2022             Pallavi Milan

## 2022-06-01 NOTE — PROGRESS NOTES
Continued Stay Note  [facility]     Patient Name: Cooper Covarrubias  MRN: [id number]  Today's Date: 5/31/2022    Admit Date: 5/29/2022     Discharge Plan     Row Name 05/31/22 2029       Plan    Plan Home with [facility]    Patient/Family in Agreement with Plan yes    Plan Comments Hospice consult received. Chart reviewed. BS visit made w/ both daughters, Mitra & Helena, w/ daughter, Jennifer via speaker phone. Hospice services/POC/philosophy discussed. Hospice related questions/concerns answered/addressed. Family did ask about an in pt hospice admission. Writer explained in pt hospice medicare criteria & that if a pt does not meet in pt hospice criteria when first referred to hospice, that home/out pt hospice will continue to follow & w/ changes in status an in pt hospice admission could be possible. Family verbalized understanding & state that if the pt does not decline & meet in pt hospice criteria, they would like to take the pt home w/ hospice. Family would like the pt to continue to be treated w/ ABX for his UTI prior to d/c. Family is aware that hospice can manage a chandler cath in the home setting & that an order to have you placed prior to d/c is an option.Family state that the pt will require home 02, hospital bed CESAR mattress, OBT, chux, latex free M/L gloves, briefs w/ tabs, & mouth swabs. Pt would benefit from a 3-5 day supply of comfort meds filled via [facility] @ [facility]. Family state that they will have the room ready for hospice DME by Thursday afternoon. Ambulance request will be sent to [facility]. Family did complete an EMS DNR prior to transport. 24 hr hospice number was provided to family along w/ the contact information for hospice @ [facility]. Pt.'s hospice diagnosis & admission was discussed with Hospice MD, Dr. Starr Crystal, due to lack of having a clear hospice dx. Per discussion, pt could be admitted for COPD despite being home 02 @ baseline, but w/o decline the hospice team may have  to d/c the pt. Writer will f/u w/ family tomorrow. Hospice will continue to follow. If further assistance is needed, please call [phone].               Discharge Codes    No documentation.               Expected Discharge Date and Time     Expected Discharge Date Expected Discharge Time    Mahad 3, 2022             Pallavi Milan

## 2022-06-01 NOTE — CASE MANAGEMENT/SOCIAL WORK
Continued Stay Note  [facility]     Patient Name: Cooper Covarrubias  MRN: [id number]  Today's Date: 6/1/2022    Admit Date: 5/29/2022     Discharge Plan     Row Name 06/01/22 1142       Plan    Plan update    Patient/Family in Agreement with Plan yes    Plan Comments Per MDR, family has opted for comfort measures and to go home with hospice services likely tomorrow.    Final Discharge Disposition Code 50 - home with hospice    Row Name 06/01/22 1018       Plan    Plan Home with [facility]    Patient/Family in Agreement with Plan yes    Plan Comments Call from the pt.'s daughters, Adamaris & Mitra, stating that the pt is uncomfortable & that they wanted to know what could be done to intervene & make the pt more comfortable. Writer did explain that making the pt comfort measure only would allow the Palliative Care to do adjustments that will make the pt more comfortable. Writer explained that we are working to transition the pt home & when the ambulance is arranged she will update the family. Family agreeable. Call placed a call to Selam, Palliative Care, to ask her to go & assess the pt. Selam agreeable. Hospice will continue to follow. If further assistance is needed, please call [phone].               Discharge Codes    No documentation.               Expected Discharge Date and Time     Expected Discharge Date Expected Discharge Time    Mahad 3, 2022             Hanny Mejia RN

## 2022-06-02 NOTE — NURSING NOTE
Extensive education provided to family regarding end of life care, hospice, comfort measures and medication administration. While familly was in the room, pt agreeable to have chandler placed for comfort. After family went to get lunch, pt states he does not want a chandler cath. Charge nurse also verified that pt declined chandler cath placement .

## 2022-06-02 NOTE — PLAN OF CARE
Goal Outcome Evaluation:   Pt continues on comfort measures only, pain has decreased, pt more alert today, smiling and cooperative with staff. Pt plan is to go home tomorrow at 1230pm with hospice care. Family at beside this morning asking appropriate questions regarding end of life care and how to provide for pt needs. Education provided as requested. Verbalized understanding. Speech evaled pt due to new difficulty swallowing pills. Advised Nectar thick liquids and pudding with meds. Provided Nectar thickener packs. Family states that they have everything set up for pt to come home tomorrow. Morphine was d/c'd and changed to Percocet PRN.

## 2022-06-02 NOTE — PROGRESS NOTES
Continued Stay Note  [facility]     Patient Name: Cooper Covarrubias  MRN: [id number]  Today's Date: 6/2/2022    Admit Date: 5/29/2022     Discharge Plan     Row Name 06/02/22 1828       Plan    Plan Home with [facility]    Plan Comments Visit made to pt, pt's 2 daughters present. Plan remains for pt to discharge home tomorrow with hospice care. [facility] to transport pt at 1230. Hospice delivered equipment to pt's home today. Daughters have the hospice 24 hr number to call to initiate hospice services when pt arrives home. Will continue to follow. Please call [phone] if can be of further assistance.               Discharge Codes    No documentation.               Expected Discharge Date and Time     Expected Discharge Date Expected Discharge Time    Mahad 3, 2022             Malena Dodd RN

## 2022-06-02 NOTE — NURSING NOTE
Met with pt and family at bedside to discuss placement of chandler catheter. Family was strongly encouraging pt to agree to placement telling him that it would make it easier for them to care for him and that it could make it easier for him as well. Pt appeared uncomfortable with the conversation. When advised that it was not medically necessary and only for comfort measures, pt declined placement.

## 2022-06-02 NOTE — PROGRESS NOTES
[facility]  PROGRESS NOTE    Patient Name: Cooper Covarrubias  : 1931  MRN: [id number]    Date of Admission: 2022  Primary Care Physician: Lb Nielsen MD    Subjective   Subjective     CC:  F/U Hyponatremia, UTI     HPI:  Denies pain or dyspnea. Daughters bedside    ROS:  Gen- No fevers, chills  CV- No chest pain, palpitations  Resp- No cough, dyspnea  GI- No N/V/D, abd pain (per  HPI)     Objective   Objective     Vital Signs:   Temp:  [92 °F (33.3 °C)-94.5 °F (34.7 °C)] 92 °F (33.3 °C)  Heart Rate:  [55-68] 55  Resp:  [15-18] 15  BP: ()/(34-88) 141/72     Physical Exam:  Constitutional: No acute distress, awake, alert, confused to details, answers simple questions  HENT: NCAT, mucous membranes moist  Respiratory: Clear to auscultation bilaterally, respiratory effort normal   Cardiovascular: RRR, no murmurs, rubs, or gallops  Gastrointestinal: Positive bowel sounds, soft, nontender, nondistended  Musculoskeletal: No bilateral ankle edema   Psychiatric: Flat affect, cooperative  Neurologic: No focal deficits   Skin: No rashes, scarring on face from prior skin cancer removal     Results Reviewed:  LAB RESULTS:      Lab 22  0838   WBC 8.17 10.03 14.44*   HEMOGLOBIN 9.0* 9.2* 8.5*   HEMATOCRIT 27.4* 28.4* 26.2*   PLATELETS 172 166 100*   NEUTROS ABS 7.08* 9.08*  --    IMMATURE GRANS (ABS) 0.09* 0.11*  --    LYMPHS ABS 0.47* 0.31*  --    MONOS ABS 0.48 0.50  --    EOS ABS 0.02 0.02  --    MCV 75.9* 76.3* 77.1*         Lab 22  0011 22  0838   SODIUM 133* 131* 129* 139   POTASSIUM 3.9 3.9 3.9 3.8   CHLORIDE 96* 95* 94* 102   CO2 29.0 27.0 28.0 29.0   ANION GAP 8.0 9.0 7.0 8.0   BUN 27* 26* 28* 33*   CREATININE 1.03 0.95 0.94 1.18   EGFR 69.0 76.0 77.0 58.6*   GLUCOSE 93 124* 123* 82   CALCIUM 8.6 8.3 8.4 8.8   PHOSPHORUS  --   --   --  2.7         Lab 22  1828 22  0838    TOTAL PROTEIN 5.9*  --    ALBUMIN 2.90* 2.80*   GLOBULIN 3.0  --    ALT (SGPT) 32  --    AST (SGOT) 19  --    BILIRUBIN 0.5  --    ALK PHOS 107  --                      Brief Urine Lab Results  (Last result in the past 365 days)      Color   Clarity   Blood   Leuk Est   Nitrite   Protein   CREAT   Urine HCG        05/29/22 1941 Yellow   Clear   Negative   Small (1+)   Positive   Negative                 Microbiology Results Abnormal     Procedure Component Value - Date/Time    COVID PRE-OP / PRE-PROCEDURE SCREENING ORDER (NO ISOLATION) - Swab, Nasopharynx [[id number]]  (Normal) Collected: 05/29/22 2131    Lab Status: Final result Specimen: Swab from Nasopharynx Updated: 05/29/22 2209    Narrative:      The following orders were created for panel order COVID PRE-OP / PRE-PROCEDURE SCREENING ORDER (NO ISOLATION) - Swab, Nasopharynx.  Procedure                               Abnormality         Status                     ---------                               -----------         ------                     COVID-19 and FLU A/B PCR...[[id number]]  Normal              Final result                 Please view results for these tests on the individual orders.    COVID-19 and FLU A/B PCR - Swab, Nasopharynx [[id number]]  (Normal) Collected: 05/29/22 2131    Lab Status: Final result Specimen: Swab from Nasopharynx Updated: 05/29/22 2209     COVID19 Not Detected     Influenza A PCR Not Detected     Influenza B PCR Not Detected    Narrative:      Fact sheet for providers: https://www.fda.gov/media/003829/download    Fact sheet for patients: https://www.fda.gov/media/631791/download    Test performed by PCR.          No radiology results from the last 24 hrs        I have reviewed the medications:  Scheduled Meds:azelastine, 2 spray, Each Nare, BID  cefuroxime, 500 mg, Oral, Q12H  hydrALAZINE, 25 mg, Oral, Q12H  lactobacillus acidophilus, 1 capsule, Oral, Daily  levothyroxine, 137 mcg, Oral, Q AM  LORazepam, 0.5 mg, Oral,  Q12H  nystatin, 5 mL, Swish & Spit, 4x Daily  phenazopyridine, 200 mg, Oral, TID With Meals  [START ON 6/3/2022] predniSONE, 2.5 mg, Oral, BID With Meals  predniSONE, 5 mg, Oral, BID With Meals  sodium chloride, 10 mL, Intravenous, Q12H  tamsulosin, 0.4 mg, Oral, Nightly      Continuous Infusions:   PRN Meds:.•  acetaminophen **OR** acetaminophen **OR** acetaminophen  •  albuterol  •  artificial tears  •  ipratropium-albuterol  •  loperamide  •  melatonin  •  ondansetron  •  oxyCODONE-acetaminophen  •  QUEtiapine  •  sodium chloride    Assessment & Plan   Assessment & Plan     Active Hospital Problems    Diagnosis  POA   • Hyponatremia [E87.1]  Yes   • 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        Brief Hospital Course to date:  Cooper Covarrubias is a 89 y.o. male with past medical history of asthma, anxiety, BPH with urinary obstruction, COPD, depression, chronic anemia, essential hypertension, recent admission with discharged earlier today for SIADH with hyponatremia who returns from [facility] rehab for severe dysuria, difficulty urinating, and recurrence and hyponatremia.    This patient's problems and plans were partially entered by my partner and updated as appropriate by me 06/02/22.      UTI, Complicated   Sepsis, poa (due to above)  -hypothermia, hypotension  -Urine Cx + Citrobacter freundii and Klebsiella pneumoniae, sensitive to Rocephin. Since going comfort measures, placed on ceftin (day #5/7)    Relative Adrenal Insufficiency    Hypothermia/Bradycardia  Hypothyroidism:  - His cortisol borderline low (only 8) and also with lab abnormalities c/w hypothyroidism. He was treated with IV hydrocortisone and synthroid with improvement in vitals during last admission.    -Remains on steroid taper   - Recheck TSH in 4 weeks.     Hyponatremia  SIADH  - His urine studies seem consistent with SIADH, low serum osm, high urin osm, urine sodium >20 last admision  - s/p Tolvaptan 5/24 (last admission) with normalization of sodium  - Pt now comfort measures, will DC fluid restriction     COPD without acute exacerbation    Chronic Anemia     Oral Thrush   -likely secondary to advair, temporarily held.  Needs instructions to rinse mouth after use. Continue magic mouthwash.     Urinary Retention  BPH  -Required FC last admission and then transitioned to straight cath   -Bladder scan q4h   -Pyridium   -Continue Flomax   -Dr Yeh with Urology has seen   -Palliative offered FC for comfort     Anxiety  -Continue reduced dose of Benzo, Ativan BID     Hypothyroidism  -Continue Synthroid     Comfort measures now. Home with hospice Friday 6/3/22      DVT prophylaxis:  Mechanical DVT prophylaxis orders are present.       AM-PAC 6 Clicks Score (PT): 17 (05/30/22 1335)    Disposition: I expect the patient to be discharged home tomorrow Friday 6/3/22 at 12:30      CODE STATUS:   Code Status and Medical Interventions:   Ordered at: 06/01/22 1102     Level Of Support Discussed With:    Next of Kin (If No Surrogate)     Code Status (Patient has no pulse and is not breathing):    No CPR (Do Not Attempt to Resuscitate)     Medical Interventions (Patient has pulse or is breathing):    Comfort Measures     Comments:    Adrien Manriquez and Helena Olmstead MD  06/02/22

## 2022-06-02 NOTE — PLAN OF CARE
Goal Outcome Evaluation:  Plan of Care Reviewed With: patient, daughter        Progress: no change  Outcome Evaluation: Pt seen by EYAD DELGADO. Pain medication rotated to Percocet; pt continues to refuse indwelling urinary catheter. Plan for home with hospice tomorrow.    1330 Palliative IDT meeting: ALONZO Rodriguez RN, PN; DANNY Gleason; COLE Erickson, ; KESHIA Voss RN, CHPN; SARAH Black, [facility], [facility]    Problem: Palliative Care  Goal: Enhanced Quality of Life  Outcome: Ongoing, Progressing  Intervention: Promote Advance Care Planning  Flowsheets (Taken 6/2/2022 1552)  Life Transition/Adjustment: (Home with hospice plan for tomorrow) other (see comments)  Intervention: Maximize Comfort  Flowsheets (Taken 6/2/2022 1552)  Pain Management Interventions: (rotate opioid to Percocet) pain management plan reviewed with patient/caregiver  Intervention: Optimize Function  Flowsheets (Taken 6/2/2022 1552)  Sensory Stimulation Regulation: quiet environment promoted  Sleep/Rest Enhancement: family presence promoted  Intervention: Optimize Psychosocial Wellbeing  Flowsheets (Taken 6/2/2022 1552)  Supportive Measures: decision-making supported

## 2022-06-02 NOTE — PLAN OF CARE
Goal Outcome Evaluation:    SLP evaluation completed. Will sign-off dysphagia. Please see note for further details and recommendations.

## 2022-06-02 NOTE — CASE MANAGEMENT/SOCIAL WORK
Continued Stay Note  [facility]     Patient Name: Cooper Covarrubias  MRN: [id number]  Today's Date: 6/2/2022    Admit Date: 5/29/2022     Discharge Plan     Row Name 06/02/22 1403       Plan    Plan update    Patient/Family in Agreement with Plan yes    Plan Comments Per MDR, patient is comfort measures and tele has been d/c.  Jesus scheduled to discharge home with hospice tomorrow with ambulance arranged for 1230.    Final Discharge Disposition Code 50 - home with hospice               Discharge Codes    No documentation.               Expected Discharge Date and Time     Expected Discharge Date Expected Discharge Time    Mahad 3, 2022             Hanny Mejia RN

## 2022-06-02 NOTE — PLAN OF CARE
Goal Outcome Evaluation:  Progress: improving  Outcome Evaluation: Patient remains alert to self. On room air. Temps remain low overnight, order for a bear hugger obtained. Patient tolerated for around 30 minutes and then refused warming methods, said they were uncomfortable. PRN seroquel and morphine given overnight. Skin care provided as necessary. Continue POC.

## 2022-06-02 NOTE — THERAPY DISCHARGE NOTE
Acute Care - Speech Language Pathology   Swallow Initial Evaluation/Discharge [facility]     Clinical Swallow Evaluation       Patient Name: Cooper Covarrubias  : 1931  MRN: [id number]  Today's Date: 2022               Admit Date: 2022    Visit Dx:    ICD-10-CM ICD-9-CM   1. SIADH (syndrome of inappropriate ADH production) (Formerly [facility])  E22.2 253.6   2. Hyponatremia  E87.1 276.1   3. Acute UTI  N39.0 599.0   4. Dysphagia, unspecified type  R13.10 787.20     Patient Active Problem List   Diagnosis   • Asthma   • Anxiety   • Urinary incontinence   • BPH with urinary obstruction   • Nocturia   • Arthritis   • Vitamin D deficiency   • Chronic obstructive lung disease (Formerly [facility])   • Conjunctivitis   • Recurrent major depressive disorder, in full remission (Formerly [facility])   • Declining functional status   • Anemia   • Essential hypertension   • Hyponatremia   • SIADH (syndrome of inappropriate ADH production) (Formerly [facility])     Past Medical History:   Diagnosis Date   • Anxiety    • Arthritis    • Basal cell carcinoma    • Cataract    • COPD (chronic obstructive pulmonary disease) ([facility])    • Hypertension    • Urinary incontinence    • Visual impairment      Past Surgical History:   Procedure Laterality Date   • CATARACT EXTRACTION     • SKIN CANCER EXCISION      , , , [id number]       SLP Recommendation and Plan  SLP Swallowing Diagnosis: mild, pharyngeal dysphagia (22)  SLP Diet Recommendation: soft textures, chopped, thin liquids (22)           Swallow Criteria for Skilled Therapeutic Interventions Met: no problems identified which require skilled intervention, other (see comments) (patient/family educated re options for taking meds safely and comfortably.) (22)  Anticipated Discharge Disposition (SLP): home, other (see comments) (w Hospice) (22)  Rehab Potential/Prognosis, Swallowing: other (see comments) (no further SLP services indicated) (22)  Therapy Frequency  (Swallow): evaluation only (06/02/22 1530)              Anticipated Discharge Disposition (SLP): home, other (see comments) (w Hospice) (06/02/22 1530)                           SWALLOW EVALUATION (last 72 hours)     SLP Adult Swallow Evaluation     Row Name 06/02/22 1530                   Rehab Evaluation    Subjective Information no complaints  -        Patient Observations alert;cooperative;agree to therapy  -        Patient/Family/Caregiver Comments/Observations family/caregivers present  -        Patient Effort adequate  -        Symptoms Noted During/After Treatment none  -                  General Information    Patient Profile Reviewed yes  -        Pertinent History Of Current Problem Referral received d/t RN concerns for increase difficulty swallowing pills.  -        Current Method of Nutrition soft textures;ground;thin liquids;other (see comments)  family resported he eats mostly smooth textures.  -        Prior Level of Function-Communication unknown  -        Prior Level of Function-Swallowing no diet consistency restrictions  -        Plans/Goals Discussed with patient and family;agreed upon  -        Barriers to Rehab medically complex  -        Patient's Goals for Discharge patient did not state  -        Family Goals for Discharge patient able to eat/drink without coughing/choking;other (see comments)  and to safely take meds without difficulty  -                  Pain    Additional Documentation Pain Scale: FACES Pre/Post-Treatment (Group)  -                  Pain Scale: FACES Pre/Post-Treatment    Pain: FACES Scale, Pretreatment 0-->no hurt  -        Posttreatment Pain Rating 0-->no hurt  -                  Oral Motor Structure and Function    Secretion Management WNL/WFL  -        Volitional Swallow weak  -                  Oral Musculature and Cranial Nerve Assessment    Oral Motor General Assessment generalized oral motor weakness  -                   General Eating/Swallowing Observations    Respiratory Support Currently in Use room air  -        Eating/Swallowing Skills fed by SLP  -        Positioning During Eating upright 90 degree;upright in bed  -        Utensils Used spoon;cup;straw  -        Consistencies Trialed ice chips;thin liquids;nectar/syrup-thick liquids;pureed;soft textures  -        Pre SpO2 (%) 94  -CH        Post SpO2 (%) 96  -CH                  Respiratory    Respiratory Status WFL;room air  -                  Clinical Swallow Eval    Oral Prep Phase impaired  -        Oral Transit impaired  -        Oral Residue impaired  -        Pharyngeal Phase suspected pharyngeal impairment  with meds only, patient c/o pills getting stuck in his throat  -        Esophageal Phase unremarkable  -        Clinical Swallow Evaluation Summary Prolonged oral phase with increased mastication time and prolonged oral transit with mild lingual residue which cleared with subsequent swallow. No overt s/s of aspiration with any consistency. C/o pills getting stuck in his throat. Trialed small piece of skip cracker (pill sized) in pureed, nectar thick and with thin liquids. Best when crushed with pureed or whole with nectar thick liquids. Informed family of pill swallowing gel, use of pureed consistency and thickened liquids for ease of swallowing pills.  -                  Oral Prep Concerns    Oral Prep Concerns increased prep time  -                  Oral Transit Concerns    Oral Transit Concerns increased oral transit time  -                  Oral Residue Concerns    Oral Residue Concerns other (see comments)  mild lingual residue, cleared with subsequent swallow  -                  Pharyngeal Phase Concerns    Pharyngeal Phase Concerns other (see comments)  pills sticking in throat  -                  SLP Evaluation Clinical Impression    SLP Swallowing Diagnosis mild;pharyngeal dysphagia  -        Functional Impact risk of  aspiration/pneumonia;other  w meds only  -        Rehab Potential/Prognosis, Swallowing other (see comments)  no further SLP services indicated  -        Swallow Criteria for Skilled Therapeutic Interventions Met no problems identified which require skilled intervention;other (see comments)  patient/family educated re options for taking meds safely and comfortably.  -                  Recommendations    Therapy Frequency (Swallow) evaluation only  -        SLP Diet Recommendation soft textures;chopped;thin liquids  -        Recommended Precautions and Strategies general aspiration precautions  -        Oral Care Recommendations Oral Care BID/PRN  -        SLP Rec. for Method of Medication Administration meds whole;meds crushed;with thick liquids;with pudding or applesauce;as tolerated  or with pill gel  -        Anticipated Discharge Disposition (SLP) home;other (see comments)  w Hospice  -              User Key  (r) = Recorded By, (t) = Taken By, (c) = Cosigned By    Initials Name Effective Dates    Adele Walsh MS CCC-SLP 06/16/21 -                 EDUCATION  The patient has been educated in the following areas:   Dysphagia (Swallowing Impairment) Oral Care/Hydration Modified Diet Instruction.                 Time Calculation:    Time Calculation- SLP     Row Name 06/02/22 1622             Time Calculation- SLP    SLP Start Time 1530  -      SLP Received On 06/02/22  -              Untimed Charges    SLP Eval/Re-eval  ST Eval Oral Pharyng Swallow - 05340  -      84338-[id number] Eval Oral Pharyng Swallow Minutes 40  -CH              Total Minutes    Untimed Charges Total Minutes 40  -CH       Total Minutes 40  -CH            User Key  (r) = Recorded By, (t) = Taken By, (c) = Cosigned By    Initials Name Provider Type    Adele Walsh MS CCC-SLP Speech and Language Pathologist                Therapy Charges for Today     Code Description Service Date Service Provider Modifiers Qty     [id number]  ST EVAL ORAL PHARYNG SWALLOW 3 6/2/2022 Adele Abbott MS CCC-SLP GN 1               SLP Discharge Summary  Anticipated Discharge Disposition (SLP): home, other (see comments) (w Hospice)    Adele Abbott MS CCC-SLP  6/2/2022     Patient was not wearing a face mask and did not exhibit coughing during this therapy encounter.  Procedure performed was aerosolizing, involved close contact (within 6 feet for at least 15 minutes or longer), and did not involve contact with infectious secretions or specimens.  Therapist used appropriate personal protective equipment including gloves and standard procedure mask.  Appropriate PPE was worn during the entire therapy session.  Hand hygiene was completed before and after therapy session.

## 2022-06-02 NOTE — PROGRESS NOTES
"Palliative Care Daily Progress Note     C/C: Patient sleeping.    S: Medical record reviewed. Follow up visit for symptom management and GOC in the context of complex medical decision making. Events noted. RN reports patient with continued restlessness overnight, Seroquel ineffective. Patient did not sleep last night, finally alseep at time of visit, did not wake up. Patient's BP 57/39.  Patient with Morphine 5mg PO x 3 doses and receiving scheduled Norco for total of 25 MME's in last 24 hours. Patient received Lorazepam 0.5mg PO BID.     ROS: Unable to complete ROS due to patient sleeping.     O: Code Status:   Code Status and Medical Interventions:   Ordered at: 06/01/22 1102     Level Of Support Discussed With:    Next of Kin (If No Surrogate)     Code Status (Patient has no pulse and is not breathing):    No CPR (Do Not Attempt to Resuscitate)     Medical Interventions (Patient has pulse or is breathing):    Comfort Measures     Comments:    Adrien Manriquez and Helena      Advanced Directives: Advance Directive Status: Patient has advance directive, copy in chart   Goals of Care: Ongoing.   Palliative Performance Scale Score: 30%     BP (!) 54/39 (BP Location: Left arm)   Pulse 55   Temp 92 °F (33.3 °C) (Oral)   Resp 15   Ht 182.9 cm (72\")   Wt 87.2 kg (192 lb 4.8 oz)   SpO2 96%   BMI 26.08 kg/m²     Intake/Output Summary (Last 24 hours) at 6/2/2022 1151  Last data filed at 6/2/2022 0948  Gross per 24 hour   Intake 672 ml   Output 800 ml   Net -128 ml       PE:  General Appearance:    Patient laying in bed, asleep, eyes closed,   HEENT:    NC/AT, MMM, face relaxed   Neck:   supple, trachea midline, no JVD   Lungs:     CTA bilat, diminished in bases; respirations regular, even and unlabored; RR 20-22 on exam, on RA    Heart:    RRR, normal S1 and S2, no M/R/G   Abdomen:     Normal bowel sounds, soft, non-tender, mildly distended   G/U:   Purewick in place, draining dark urine   MSK/Extremities:   Wasting, " no edema   Pulses:   Pulses palpable and equal bilaterally   Skin:   Warm, dry   Neurologic:   sleeping,    Psych:   Sleeping, calm, appropriate     Meds: Reviewed and changes noted    Labs:   Results from last 7 days   Lab Units 05/30/22  0527   WBC 10*3/mm3 8.17   HEMOGLOBIN g/dL 9.0*   HEMATOCRIT % 27.4*   PLATELETS 10*3/mm3 172     Results from last 7 days   Lab Units 05/30/22  0527   SODIUM mmol/L 133*   POTASSIUM mmol/L 3.9   CHLORIDE mmol/L 96*   CO2 mmol/L 29.0   BUN mg/dL 27*   CREATININE mg/dL 1.03   GLUCOSE mg/dL 93   CALCIUM mg/dL 8.6     Results from last 7 days   Lab Units 05/30/22  0527 05/30/22  0011 05/29/22  1828   SODIUM mmol/L 133*   < > 129*   POTASSIUM mmol/L 3.9   < > 3.9   CHLORIDE mmol/L 96*   < > 94*   CO2 mmol/L 29.0   < > 28.0   BUN mg/dL 27*   < > 28*   CREATININE mg/dL 1.03   < > 0.94   CALCIUM mg/dL 8.6   < > 8.4   BILIRUBIN mg/dL  --   --  0.5   ALK PHOS U/L  --   --  107   ALT (SGPT) U/L  --   --  32   AST (SGOT) U/L  --   --  19   GLUCOSE mg/dL 93   < > 123*    < > = values in this interval not displayed.     Imaging Results (Last 72 Hours)     ** No results found for the last 72 hours. **                Diagnostics: Reviewed    A:   Patient Active Problem List   Diagnosis   • Asthma   • Anxiety   • Urinary incontinence   • BPH with urinary obstruction   • Nocturia   • Arthritis   • Vitamin D deficiency   • Chronic obstructive lung disease (HCC)   • Conjunctivitis   • Recurrent major depressive disorder, in full remission (Formerly [facility])   • Declining functional status   • Anemia   • Essential hypertension   • Hyponatremia   • SIADH (syndrome of inappropriate ADH production) (Formerly [facility])     Cooper Covarrubias is a 89 y.o. male with PMH significant for asthma, anxiety, depression, BPH with urinary obstruction, COPD, chronic anemia, and HTN with UTI, SIADH, hyponatremia, adrenal insufficiency and urinary retention.     S/S:   1. Debility - comfort measures     2. Pain -generalized, MSK  -discontinued  scheduled Norco and prn liquid Morphine   -requiring 25MME's for pain control  -started Percocet 5-325mg q 6 hours prn pain/dypsnea  -abdominal r/t urinary retention  -anchor FC  -continue Pyridium 200mg PO TID  -oral pain  -continue Nystatin     3. Agitation/Anxiety -increased Lorazepam to 0.5mg PO BID  -started Seroquel 25mg PO nightly prn agitation     4. GOC -DNR/DNI - comfort measures  -daughters Mitra and Helena at bedside   -discharging home tomorrow with hospice    P: Daughters at bedside. Discussed medication adjustments, daughters in agreement. Plan is to discharge home with hospice tomorrow afternoon. If patient were to decline, may be inpatient appropriate, currently drinking and eating and taking PO medications. All questions and concerns addressed.    Palliative Care Team will continue to follow patient. Please do not hesitate to contact us regarding further sx mgmt or GOC needs.  Selam Franklin, APRN  6/2/2022  Time spent: 30 minutes

## 2022-06-03 NOTE — DISCHARGE SUMMARY
[facility] Medicine Services  DISCHARGE SUMMARY    Patient Name: Cooper Covarrubias  : 1931  MRN: [id number]    Date of Admission: 2022  5:42 PM  Date of Discharge:  6/3/2022  Primary Care Physician: Lb Nielsen MD    Consults     Date and Time Order Name Status Description    2022 11:53 AM Inpatient Palliative Care MD Consult Completed     2022  9:46 PM Inpatient Urology Consult      2022  9:07 PM Inpatient Nephrology Consult      2022  7:09 AM Inpatient Nephrology Consult Completed     2022  4:15 AM Inpatient Cardiology Consult Completed     2022 12:33 AM Inpatient Neurology Consult General Completed           Hospital Course     Presenting Problem:   Hyponatremia [E87.1]  SIADH (syndrome of inappropriate ADH production) ([facility]) [E22.2]    Active Hospital Problems    Diagnosis  POA   • Hyponatremia [E87.1]  Yes   • SIADH (syndrome of inappropriate ADH production) ([facility]) [E22.2]  Yes   • Declining functional status [R53.81]  Yes   • Essential hypertension [I10]  Yes   • Anemia [D64.9]  Yes   • Recurrent major depressive disorder, in full remission (HCC) [F33.42]  Yes   • Asthma [J45.909]  Yes   • Anxiety [F41.9]  Yes   • BPH with urinary obstruction [N40.1, N13.8]  Yes   • Chronic obstructive lung disease (HCC) [J44.9]  Yes      Resolved Hospital Problems   No resolved problems to display.      -----------final diagnoses------------  Sepsis due to UTI (citrobacter and klebsiella), poa  Encephalopathy (metabolic due to infection, electrolytes, AI, etc)  Relative adrenal insufficiency  Hypothermia and bradycardia (due to sepsis and adrenal insufficiency)  Hyponatremia/Hx SIADH  Hx COPD  Thrush  Urinary retention w/ BPH  Anxiety   Hypothyroidism  DNR/DNI/Comfort care  --------------------------------------------        Hospital Course:  Cooper Covarrubias is a 89 y.o. male w/ hx bph/urinary retention, copd, anxiety/depression, siadh/hynatremia presented w/  dysuria, difficulty urinating hyponatremia. Workup revealed uti, given iv fluids, iv steroids.  Initially. Urine culture grew citrobacter and klebsiella susceptible to rocephin,. Had hypothermia and hypotension which did improve. Family ultimately opted to pursue comfort measures and plan is to go home w/ hospice care. Will give ceftin tabs to go home with. Patient has declined urinary chandler catheter thus far, but encouraged family to monitor for symptoms and consider chandler in future, in meantime continue flomax.      Discharge Follow Up Recommendations for outpatient labs/diagnostics:  Home w/ hospice    Day of Discharge     HPI:   Calm, denies pain or dyspnea, no n/v/d    Review of Systems  Confused, but does deny pain, dyspnea, n/v/d    Vital Signs:   Temp:  [92 °F (33.3 °C)-94.5 °F (34.7 °C)] 94.5 °F (34.7 °C)  Heart Rate:  [50] 50  Resp:  [15-16] 16  BP: ()/(34-76) 119/76      Physical Exam:  Alert, nontoxic, comfortable appearing, elderly, frail, chronically ill but nontoxic appearing  Ncat, oroph clear  Lungs clear  rrr  abd soft, nontender  No cce      Pertinent  and/or Most Recent Results     LAB RESULTS:      Lab 05/30/22 0527 05/29/22 1828 05/28/22  0838   WBC 8.17 10.03 14.44*   HEMOGLOBIN 9.0* 9.2* 8.5*   HEMATOCRIT 27.4* 28.4* 26.2*   PLATELETS 172 166 100*   NEUTROS ABS 7.08* 9.08*  --    IMMATURE GRANS (ABS) 0.09* 0.11*  --    LYMPHS ABS 0.47* 0.31*  --    MONOS ABS 0.48 0.50  --    EOS ABS 0.02 0.02  --    MCV 75.9* 76.3* 77.1*         Lab 05/30/22 0527 05/30/22  0011 05/29/22 1828 05/28/22  0838   SODIUM 133* 131* 129* 139   POTASSIUM 3.9 3.9 3.9 3.8   CHLORIDE 96* 95* 94* 102   CO2 29.0 27.0 28.0 29.0   ANION GAP 8.0 9.0 7.0 8.0   BUN 27* 26* 28* 33*   CREATININE 1.03 0.95 0.94 1.18   EGFR 69.0 76.0 77.0 58.6*   GLUCOSE 93 124* 123* 82   CALCIUM 8.6 8.3 8.4 8.8   PHOSPHORUS  --   --   --  2.7         Lab 05/29/22  1828 05/28/22  0838   TOTAL PROTEIN 5.9*  --    ALBUMIN 2.90* 2.80*    GLOBULIN 3.0  --    ALT (SGPT) 32  --    AST (SGOT) 19  --    BILIRUBIN 0.5  --    ALK PHOS 107  --                      Brief Urine Lab Results  (Last result in the past 365 days)      Color   Clarity   Blood   Leuk Est   Nitrite   Protein   CREAT   Urine HCG        05/29/22 1941 Yellow   Clear   Negative   Small (1+)   Positive   Negative               Microbiology Results (last 10 days)     Procedure Component Value - Date/Time    COVID PRE-OP / PRE-PROCEDURE SCREENING ORDER (NO ISOLATION) - Swab, Nasopharynx [[id number]]  (Normal) Collected: 05/29/22 2131    Lab Status: Final result Specimen: Swab from Nasopharynx Updated: 05/29/22 2209    Narrative:      The following orders were created for panel order COVID PRE-OP / PRE-PROCEDURE SCREENING ORDER (NO ISOLATION) - Swab, Nasopharynx.  Procedure                               Abnormality         Status                     ---------                               -----------         ------                     COVID-19 and FLU A/B PCR...[[id number]]  Normal              Final result                 Please view results for these tests on the individual orders.    COVID-19 and FLU A/B PCR - Swab, Nasopharynx [[id number]]  (Normal) Collected: 05/29/22 2131    Lab Status: Final result Specimen: Swab from Nasopharynx Updated: 05/29/22 2209     COVID19 Not Detected     Influenza A PCR Not Detected     Influenza B PCR Not Detected    Narrative:      Fact sheet for providers: https://www.fda.gov/media/277906/download    Fact sheet for patients: https://www.fda.gov/media/975548/download    Test performed by PCR.    Urine Culture - Urine, Urine, Clean Catch [[id number]]  (Abnormal)  (Susceptibility) Collected: 05/29/22 1941    Lab Status: Final result Specimen: Urine, Clean Catch Updated: 06/01/22 1018     Urine Culture >100,000 CFU/mL Citrobacter freundii      >100,000 CFU/mL Klebsiella pneumoniae ssp pneumoniae    Narrative:      Colonization of the urinary tract without  infection is common. Treatment is discouraged unless the patient is symptomatic, pregnant, or undergoing an invasive urologic procedure.  Colonization of the urinary tract without infection is common. Treatment is discouraged unless the patient is symptomatic, pregnant, or undergoing an invasive urologic procedure.    Susceptibility      Citrobacter freundii      EDISON      Cefazolin Resistant     Cefepime Susceptible      Ceftazidime Susceptible      Ceftriaxone Susceptible      Gentamicin Susceptible      Levofloxacin Susceptible      Nitrofurantoin Susceptible      Piperacillin + Tazobactam Susceptible      Trimethoprim + Sulfamethoxazole Susceptible                       Susceptibility      Klebsiella pneumoniae ssp pneumoniae      EDISON      Ampicillin Resistant     Ampicillin + Sulbactam Susceptible      Cefazolin Susceptible      Cefepime Susceptible      Ceftazidime Susceptible      Ceftriaxone Susceptible      Gentamicin Susceptible      Levofloxacin Susceptible      Nitrofurantoin Resistant     Piperacillin + Tazobactam Susceptible      Trimethoprim + Sulfamethoxazole Susceptible                                 EEG    Result Date: 5/23/2022  Reason for referral: 89 y.o.male with altered mental status, jerking spells Technical Summary:  A 19 channel digital EEG was performed using the international 10-20 placement system, including eye leads and EKG leads. Duration: 20 minutes Findings: The awake tracing shows diffuse medium amplitude 5 to 6 Hz theta which is present symmetrically over both hemispheres.  There is an overlay of EMG artifact anteriorly.  Light sleep is seen with mild slowing of the background.  Photic stimulation does not change the tracing.  Hyperventilation is not performed.  No focal features or epileptiform activity are seen. Video: On Technical quality: Good EKG: Regular, 60 bpm SUMMARY: Mild generalized slow No focal features or epileptiform activity are seen     Diffuse cerebral  dysfunction of mild degree, nonspecific No evidence for epilepsy is seen This report is transcribed using the Dragon dictation system.                    Plan for Follow-up of Pending Labs/Results: pcp    Discharge Details        Discharge Medications      New Medications      Instructions Start Date   acetaminophen 325 MG tablet  Commonly known as: TYLENOL   650 mg, Oral, Every 6 Hours PRN      cefuroxime 500 MG tablet  Commonly known as: CEFTIN   500 mg, Oral, Every 12 Hours Scheduled      hydrALAZINE 25 MG tablet  Commonly known as: APRESOLINE   25 mg, Oral, Every 12 Hours Scheduled      loperamide 2 MG capsule  Commonly known as: IMODIUM   2 mg, Oral, 4 Times Daily PRN      oxyCODONE-acetaminophen 5-325 MG per tablet  Commonly known as: PERCOCET   1 tablet, Oral, Every 6 Hours PRN      QUEtiapine 25 MG tablet  Commonly known as: SEROquel   25 mg, Oral, Nightly PRN         Changes to Medications      Instructions Start Date   LORazepam 0.5 MG tablet  Commonly known as: ATIVAN  What changed: how much to take   0.5 mg, Oral, Every 12 Hours Scheduled         Continue These Medications      Instructions Start Date   AeroChamber Plus Sudarshan-Vu misc   USE AS DIRECTED PER DOCTOR'S INSTRUCTIONS      albuterol sulfate  (90 Base) MCG/ACT inhaler  Commonly known as: PROVENTIL HFA;VENTOLIN HFA;PROAIR HFA   ProAir  (90 Base) MCG/ACT Inhalation Aerosol Solution; Patient Sig: ProAir  (90 Base) MCG/ACT Inhalation Aerosol Solution INHALE ONE TO TWO PUFFS BY MOUTH EVERY 6 HOURS AS NEEDED; 8.5; 1; 06-Aug-2014; Active      ammonium lactate 12 % lotion  Commonly known as: Lac-Hydrin   Apply to skin bid      artificial tears ointment ophthalmic ointment   Both Eyes, Every 1 Hour PRN, One drop each eye BID prn      azelastine 0.1 % nasal spray  Commonly known as: ASTELIN   2 sprays, Nasal, 2 Times Daily, Use in each nostril as directed      fluticasone-salmeterol 45-21 MCG/ACT inhaler  Commonly known as: ADVAIR HFA    INHALE TWO PUFFS BY MOUTH TWICE A DAY      ipratropium-albuterol 0.5-2.5 mg/3 ml nebulizer  Commonly known as: DUO-NEB   3 mL, Nebulization, Every 4 Hours PRN      levothyroxine 137 MCG tablet  Commonly known as: SYNTHROID, LEVOTHROID   137 mcg, Oral, Every Early Morning      melatonin 5 MG tablet tablet   5 mg, Oral, Nightly PRN      predniSONE 5 MG tablet  Commonly known as: DELTASONE   Take 2 tablets by mouth 2 (Two) Times a Day for 3 days, THEN 1 tablet 2 (Two) Times a Day for 3 days, THEN 1 tablet Daily for 3 days.   Start Date: May 28, 2022     tamsulosin 0.4 MG capsule 24 hr capsule  Commonly known as: FLOMAX   0.4 mg, Oral, Daily      triamcinolone 0.1 % ointment  Commonly known as: KENALOG   1 application, Topical, 2 Times Daily         Stop These Medications    furosemide 40 MG tablet  Commonly known as: LASIX     oxybutynin XL 10 MG 24 hr tablet  Commonly known as: DITROPAN-XL            Allergies   Allergen Reactions   • Latex Hives         Discharge Disposition:  Home or Self Care    Diet:  Hospital:  Diet Order   Procedures   • Diet Soft Texture; Ground; Thin; Cardiac       Activity:         CODE STATUS:    Code Status and Medical Interventions:   Ordered at: 06/01/22 1102     Level Of Support Discussed With:    Next of Kin (If No Surrogate)     Code Status (Patient has no pulse and is not breathing):    No CPR (Do Not Attempt to Resuscitate)     Medical Interventions (Patient has pulse or is breathing):    Comfort Measures     Comments:    Adrien Corcoran       Future Appointments   Date Time Provider Department Center   6/3/2022 12:30 PM EMS 1  [facility] [place] MD  06/03/22      Time Spent on Discharge:  I spent  25 minutes on this discharge activity which included: face-to-face encounter with the patient, reviewing the data in the system, coordination of the care with the nursing staff as well as consultants, documentation, and entering orders.

## 2022-06-03 NOTE — PLAN OF CARE
Goal Outcome Evaluation:   Progress: no change  Outcome Evaluation: Patient is aler to self. Remains on room air. Tolerated medications in pudding followed by thickened liquids. Skin care provided as necessary. Patient rested comfortably throughout the night. Continue POC.

## 2022-06-03 NOTE — CASE MANAGEMENT/SOCIAL WORK
Case Management Discharge Note      Final Note: Patient scheduled to discharge home with hospice services today.  All equipment and services have been arranged by hospice.  Ambulance transport today.         Selected Continued Care - Admitted Since 5/29/2022     Destination    No services have been selected for the patient.              Durable Medical Equipment    No services have been selected for the patient.              Dialysis/Infusion    No services have been selected for the patient.              Home Medical Care Coordination complete.    Service Provider Selected Services Address Phone Fax Patient Preferred    [facility] [address] [facility] [id number] [phone] --          Therapy    No services have been selected for the patient.              Community Resources    No services have been selected for the patient.              Community & DME    No services have been selected for the patient.                Selected Continued Care - Prior Encounters Includes selections from prior encounters from 2/28/2022 to 6/3/2022    Discharged on 5/28/2022 Admission date: 5/21/2022 - Discharge disposition: Home or Self Care    Destination     Service Provider Selected Services Address Phone Fax Patient Preferred    [facility] [address] [facility] [address] [phone] --                         Final Discharge Disposition Code: 50 - home with hospice

## 2022-06-08 NOTE — TELEPHONE ENCOUNTER
Tried to reach mingo no answer left voicemail advising patient tat letter was placed up front for

## 2022-07-04 PROBLEM — F32.A ANXIETY AND DEPRESSION: Status: ACTIVE | Noted: 2020-06-11

## 2022-07-04 PROBLEM — F41.9 ANXIETY AND DEPRESSION: Status: ACTIVE | Noted: 2020-06-11

## 2022-07-04 PROBLEM — A41.9 SEPSIS: Status: ACTIVE | Noted: 2022-01-01

## 2022-07-04 PROBLEM — R77.8 ELEVATED TROPONIN: Status: ACTIVE | Noted: 2022-01-01

## 2022-07-04 PROBLEM — N17.9 AKI (ACUTE KIDNEY INJURY) (HCC): Status: ACTIVE | Noted: 2022-01-01

## 2022-07-04 PROBLEM — J96.20 ACUTE ON CHRONIC RESPIRATORY FAILURE: Status: ACTIVE | Noted: 2022-01-01

## 2022-07-04 PROBLEM — R79.89 ELEVATED TROPONIN: Status: ACTIVE | Noted: 2022-01-01

## 2022-07-04 PROBLEM — J44.1 COPD EXACERBATION (HCC): Status: ACTIVE | Noted: 2022-01-01

## 2022-07-04 NOTE — ED PROVIDER NOTES
EMERGENCY DEPARTMENT ENCOUNTER    Pt Name: Cooper Covarrubias  MRN: [id number]  Pt :   1931  Room Number:  S570/1  Date of encounter:  7/3/2022  PCP: Lb Nielsen MD  ED Provider: Nicho Vizcarra MD    Historian: Patient, daughter      HPI:  Chief Complaint: Shortness of breath, cough, chest wall pain        Context: Cooper Covarrubias is a 89-year-old man with history of severe COPD, home hospice, [facility] who presents for evaluation of 3 days of worsening cough and shortness of breath with associated chest pain that is made worse by coughing.  He says he is experiencing diffuse chest pain that is worse with coughing and he has felt more short of breath as well.  He has not appreciated any fevers or systemic symptoms, rates his pain as moderate and significantly worse with coughing.  He was sent home with hospice and has outlived the initial provided timeframe of 2 weeks but his daughter says he felt bad today and told her to take him to the hospital which is the first time he has ever done that.  She says she wants him to receive full medical treatment with the exception of ACLS or intubation.  Patient is alert and oriented and he says the same thing.  No other history given at this time.    PAST MEDICAL HISTORY  Past Medical History:   Diagnosis Date   • Anxiety    • Arthritis    • Basal cell carcinoma    • Cataract    • COPD (chronic obstructive pulmonary disease) (HCC)    • Hypertension    • Urinary incontinence    • Visual impairment          PAST SURGICAL HISTORY  Past Surgical History:   Procedure Laterality Date   • CATARACT EXTRACTION     • SKIN CANCER EXCISION      , , , [id number]         FAMILY HISTORY  Family History   Problem Relation Age of Onset   • No Known Problems Mother    • Cancer Father    • Cancer Brother          SOCIAL HISTORY  Social History     Socioeconomic History   • Marital status:    Tobacco Use   • Smoking status: Former Smoker     Packs/day: 2.00      Years: 30.00     Pack years: 60.00   • Smokeless tobacco: Never Used   Substance and Sexual Activity   • Alcohol use: No   • Drug use: No   • Sexual activity: Defer     Comment:           ALLERGIES  Latex        REVIEW OF SYSTEMS  Review of Systems       All systems reviewed and negative except for those discussed in HPI.       PHYSICAL EXAM    I have reviewed the triage vital signs and nursing notes.    ED Triage Vitals   Temp Heart Rate Resp BP SpO2   07/03/22 2219 07/03/22 2219 07/03/22 2219 07/03/22 2215 07/03/22 2215   98.5 °F (36.9 °C) 80 16 104/60 97 %      Temp src Heart Rate Source Patient Position BP Location FiO2 (%)   07/04/22 0251 07/04/22 0251 07/04/22 0251 07/04/22 0251 --   Oral Monitor Lying Left arm        Physical Exam  GENERAL:   Appears ill, pale, diaphoretic, uncomfortable  HENT: Nares patent.  Dry mucous membranes  EYES: No scleral icterus.  CV: Regular rhythm, regular rate.  RESPIRATORY: Increased respiratory effort with coarse rhonchi in all lung fields without appreciated focal consolidation ABDOMEN: Soft, nontender  MUSCULOSKELETAL: No deformities.   NEURO: Alert, moves all extremities, follows commands.  SKIN: Warm, dry, no rash visualized.        LAB RESULTS  Recent Results (from the past 24 hour(s))   Comprehensive Metabolic Panel    Collection Time: 07/03/22 10:49 PM    Specimen: Blood   Result Value Ref Range    Glucose 115 (H) 65 - 99 mg/dL    BUN 41 (H) 8 - 23 mg/dL    Creatinine 1.44 (H) 0.76 - 1.27 mg/dL    Sodium 145 136 - 145 mmol/L    Potassium 3.9 3.5 - 5.2 mmol/L    Chloride 107 98 - 107 mmol/L    CO2 29.0 22.0 - 29.0 mmol/L    Calcium 9.0 8.2 - 9.6 mg/dL    Total Protein 6.0 6.0 - 8.5 g/dL    Albumin 2.50 (L) 3.50 - 5.20 g/dL    ALT (SGPT) 11 1 - 41 U/L    AST (SGOT) 14 1 - 40 U/L    Alkaline Phosphatase 139 (H) 39 - 117 U/L    Total Bilirubin 0.5 0.0 - 1.2 mg/dL    Globulin 3.5 gm/dL    A/G Ratio 0.7 g/dL    BUN/Creatinine Ratio 28.5 (H) 7.0 - 25.0    Anion Gap 9.0  5.0 - 15.0 mmol/L    eGFR 46.2 (L) >60.0 mL/min/1.73   BNP    Collection Time: 07/03/22 10:49 PM    Specimen: Blood   Result Value Ref Range    proBNP 716.0 0.0 - 1,800.0 pg/mL   Troponin    Collection Time: 07/03/22 10:49 PM    Specimen: Blood   Result Value Ref Range    Troponin T 0.035 (C) 0.000 - 0.030 ng/mL   Green Top (Gel)    Collection Time: 07/03/22 10:49 PM   Result Value Ref Range    Extra Tube Hold for add-ons.    Lavender Top    Collection Time: 07/03/22 10:49 PM   Result Value Ref Range    Extra Tube hold for add-on    Gold Top - SST    Collection Time: 07/03/22 10:49 PM   Result Value Ref Range    Extra Tube Hold for add-ons.    Gray Top    Collection Time: 07/03/22 10:49 PM   Result Value Ref Range    Extra Tube Hold for add-ons.    Light Blue Top    Collection Time: 07/03/22 10:49 PM   Result Value Ref Range    Extra Tube Hold for add-ons.    CBC Auto Differential    Collection Time: 07/03/22 10:49 PM    Specimen: Blood   Result Value Ref Range    WBC 8.74 3.40 - 10.80 10*3/mm3    RBC 4.02 (L) 4.14 - 5.80 10*6/mm3    Hemoglobin 10.1 (L) 13.0 - 17.7 g/dL    Hematocrit 33.1 (L) 37.5 - 51.0 %    MCV 82.3 79.0 - 97.0 fL    MCH 25.1 (L) 26.6 - 33.0 pg    MCHC 30.5 (L) 31.5 - 35.7 g/dL    RDW 20.8 (H) 12.3 - 15.4 %    RDW-SD 61.2 (H) 37.0 - 54.0 fl    MPV 10.6 6.0 - 12.0 fL    Platelets 192 140 - 450 10*3/mm3   Lipase    Collection Time: 07/03/22 10:49 PM    Specimen: Blood   Result Value Ref Range    Lipase 21 13 - 60 U/L   Lactic Acid, Plasma    Collection Time: 07/03/22 10:49 PM    Specimen: Blood   Result Value Ref Range    Lactate 1.0 0.5 - 2.0 mmol/L   Procalcitonin    Collection Time: 07/03/22 10:49 PM    Specimen: Blood   Result Value Ref Range    Procalcitonin 0.53 (H) 0.00 - 0.25 ng/mL   C-reactive Protein    Collection Time: 07/03/22 10:49 PM    Specimen: Blood   Result Value Ref Range    C-Reactive Protein 20.21 (H) 0.00 - 0.50 mg/dL   Magnesium    Collection Time: 07/03/22 10:49 PM     Specimen: Blood   Result Value Ref Range    Magnesium 2.0 1.6 - 2.4 mg/dL   Phosphorus    Collection Time: 07/03/22 10:49 PM    Specimen: Blood   Result Value Ref Range    Phosphorus 3.0 2.5 - 4.5 mg/dL   CK    Collection Time: 07/03/22 10:49 PM    Specimen: Blood   Result Value Ref Range    Creatine Kinase 12 (L) 20 - 200 U/L   Manual Differential    Collection Time: 07/03/22 10:49 PM    Specimen: Blood   Result Value Ref Range    Neutrophil % 70.0 42.7 - 76.0 %    Lymphocyte % 7.0 (L) 19.6 - 45.3 %    Monocyte % 10.0 5.0 - 12.0 %    Eosinophil % 0.0 (L) 0.3 - 6.2 %    Basophil % 0.0 0.0 - 1.5 %    Bands %  13.0 (H) 0.0 - 5.0 %    Neutrophils Absolute 7.25 (H) 1.70 - 7.00 10*3/mm3    Lymphocytes Absolute 0.61 (L) 0.70 - 3.10 10*3/mm3    Monocytes Absolute 0.87 0.10 - 0.90 10*3/mm3    Eosinophils Absolute 0.00 0.00 - 0.40 10*3/mm3    Basophils Absolute 0.00 0.00 - 0.20 10*3/mm3    Anisocytosis Slight/1+ None Seen    WBC Morphology Normal Normal    Platelet Morphology Normal Normal   Blood Gas, Arterial With Co-Ox    Collection Time: 07/03/22 11:07 PM    Specimen: Arterial Blood   Result Value Ref Range    Site Right Radial     Jackson's Test N/A     pH, Arterial 7.450 7.350 - 7.450 pH units    pCO2, Arterial 45.4 (H) 35.0 - 45.0 mm Hg    pO2, Arterial 81.9 (L) 83.0 - 108.0 mm Hg    HCO3, Arterial 31.5 (H) 20.0 - 26.0 mmol/L    Base Excess, Arterial 6.7 (H) 0.0 - 2.0 mmol/L    Hemoglobin, Blood Gas 10.0 (L) 13.5 - 17.5 g/dL    Hematocrit, Blood Gas 30.7 (L) 38.0 - 51.0 %    Oxyhemoglobin 95.5 94 - 99 %    Methemoglobin 0.70 0.00 - 1.50 %    Carboxyhemoglobin 0.6 0 - 2 %    CO2 Content 32.9 22 - 33 mmol/L    Temperature 37.0 C    Barometric Pressure for Blood Gas      Modality Nasal Cannula     FIO2 32 %    Rate 0 Breaths/minute    PIP 0 cmH2O    IPAP 0     EPAP 0     Note      pH, Temp Corrected 7.450 pH Units    pCO2, Temperature Corrected 45.4 35 - 48 mm Hg    pO2, Temperature Corrected 81.9 (L) 83 - 108 mm Hg    Troponin    Collection Time: 07/04/22  2:15 AM    Specimen: Blood   Result Value Ref Range    Troponin T 0.021 0.000 - 0.030 ng/mL   Comprehensive Metabolic Panel    Collection Time: 07/04/22  4:39 AM    Specimen: Blood   Result Value Ref Range    Glucose 126 (H) 65 - 99 mg/dL    BUN 41 (H) 8 - 23 mg/dL    Creatinine 1.42 (H) 0.76 - 1.27 mg/dL    Sodium 142 136 - 145 mmol/L    Potassium 4.0 3.5 - 5.2 mmol/L    Chloride 106 98 - 107 mmol/L    CO2 26.0 22.0 - 29.0 mmol/L    Calcium 9.1 8.2 - 9.6 mg/dL    Total Protein 6.0 6.0 - 8.5 g/dL    Albumin 2.30 (L) 3.50 - 5.20 g/dL    ALT (SGPT) 12 1 - 41 U/L    AST (SGOT) 13 1 - 40 U/L    Alkaline Phosphatase 139 (H) 39 - 117 U/L    Total Bilirubin 0.4 0.0 - 1.2 mg/dL    Globulin 3.7 gm/dL    A/G Ratio 0.6 g/dL    BUN/Creatinine Ratio 28.9 (H) 7.0 - 25.0    Anion Gap 10.0 5.0 - 15.0 mmol/L    eGFR 46.9 (L) >60.0 mL/min/1.73   CBC (No Diff)    Collection Time: 07/04/22  4:39 AM    Specimen: Blood   Result Value Ref Range    WBC 9.76 3.40 - 10.80 10*3/mm3    RBC 3.90 (L) 4.14 - 5.80 10*6/mm3    Hemoglobin 9.7 (L) 13.0 - 17.7 g/dL    Hematocrit 32.4 (L) 37.5 - 51.0 %    MCV 83.1 79.0 - 97.0 fL    MCH 24.9 (L) 26.6 - 33.0 pg    MCHC 29.9 (L) 31.5 - 35.7 g/dL    RDW 21.0 (H) 12.3 - 15.4 %    RDW-SD 62.6 (H) 37.0 - 54.0 fl    MPV 11.2 6.0 - 12.0 fL    Platelets 178 140 - 450 10*3/mm3   Troponin    Collection Time: 07/04/22  4:39 AM    Specimen: Blood   Result Value Ref Range    Troponin T 0.028 0.000 - 0.030 ng/mL   Respiratory Panel PCR w/COVID-19(SARS-CoV-2) TERESA/EMI/RUSTY/PAD/COR/MAD/KATE In-House, NP Swab in UTM/VTM, 3-4 HR TAT - Swab, Nasopharynx    Collection Time: 07/04/22  4:45 AM    Specimen: Nasopharynx; Swab   Result Value Ref Range    ADENOVIRUS, PCR Not Detected Not Detected    Coronavirus 229E Not Detected Not Detected    Coronavirus HKU1 Not Detected Not Detected    Coronavirus NL63 Not Detected Not Detected    Coronavirus OC43 Not Detected Not Detected    COVID19 Not  Detected Not Detected - Ref. Range    Human Metapneumovirus Not Detected Not Detected    Human Rhinovirus/Enterovirus Not Detected Not Detected    Influenza A PCR Not Detected Not Detected    Influenza B PCR Not Detected Not Detected    Parainfluenza Virus 1 Not Detected Not Detected    Parainfluenza Virus 2 Not Detected Not Detected    Parainfluenza Virus 3 Not Detected Not Detected    Parainfluenza Virus 4 Not Detected Not Detected    RSV, PCR Not Detected Not Detected    Bordetella pertussis pcr Not Detected Not Detected    Bordetella parapertussis PCR Not Detected Not Detected    Chlamydophila pneumoniae PCR Not Detected Not Detected    Mycoplasma pneumo by PCR Not Detected Not Detected   Urinalysis With Culture If Indicated - Urine, Clean Catch    Collection Time: 07/04/22  5:57 AM    Specimen: Urine, Clean Catch   Result Value Ref Range    Color, UA Yellow Yellow, Straw    Appearance, UA Turbid (A) Clear    pH, UA <=5.0 5.0 - 8.0    Specific Gravity, UA 1.025 1.001 - 1.030    Glucose, UA Negative Negative    Ketones, UA 15 mg/dL (1+) (A) Negative    Bilirubin, UA Negative Negative    Blood, UA Large (3+) (A) Negative    Protein,  mg/dL (2+) (A) Negative    Leuk Esterase, UA Moderate (2+) (A) Negative    Nitrite, UA Negative Negative    Urobilinogen, UA 0.2 E.U./dL 0.2 - 1.0 E.U./dL   Urinalysis, Microscopic Only - Urine, Clean Catch    Collection Time: 07/04/22  5:57 AM    Specimen: Urine, Clean Catch   Result Value Ref Range    RBC, UA Too Numerous to Count (A) None Seen, 0-2 /HPF    WBC, UA Too Numerous to Count (A) None Seen, 0-2 /HPF    Bacteria, UA 2+ (A) None Seen, Trace /HPF    Squamous Epithelial Cells, UA 0-2 None Seen, 0-2 /HPF    Hyaline Casts, UA 0-6 0 - 6 /LPF    Coarse Granular Casts, UA 3-6 None Seen /LPF    Amorphous Crystals, UA Moderate/2+ None Seen /HPF    Methodology Manual Light Microscopy        If labs were ordered, I independently reviewed the results.        RADIOLOGY  XR Chest 1  View    Result Date: 7/3/2022  FRONTAL VIEW OF THE CHEST CLINICAL INDICATION: Dyspnea. COMPARISON: 5/21/2022. FINDINGS: No focal consolidation, pleural effusion or pneumothorax. Cardiomediastinal morphology is normal.     No acute cardiopulmonary abnormality. Electronically signed by:  Daniel Robledo M.D.  7/3/2022 9:43 PM Mountain Time      I ordered and reviewed the above noted radiographic studies.      I viewed images of chest x-ray which reveals scattered opacities but no acute focal consolidation I can appreciate.    See radiologist's dictation for official interpretation.        PROCEDURES    Procedures    ECG 12 Lead   Preliminary Result         ECG 12 Lead    (Results Pending)       MEDICATIONS GIVEN IN ER    Medications   sodium chloride 0.9 % flush 10 mL (has no administration in time range)   aspirin chewable tablet 324 mg (0 mg Oral Hold 7/4/22 0025)   methylPREDNISolone sodium succinate (SOLU-Medrol) injection 60 mg (60 mg Intravenous Given 7/4/22 0948)   hydrALAZINE (APRESOLINE) tablet 25 mg (has no administration in time range)   ipratropium-albuterol (DUO-NEB) nebulizer solution 3 mL (has no administration in time range)   levothyroxine (SYNTHROID, LEVOTHROID) tablet 137 mcg (has no administration in time range)   QUEtiapine (SEROquel) tablet 25 mg (has no administration in time range)   tamsulosin (FLOMAX) 24 hr capsule 0.4 mg (has no administration in time range)   Pharmacy to dose vancomycin (has no administration in time range)   doxycycline (VIBRAMYCIN) 100 mg in sodium chloride 0.9 % 250 mL IVPB-VTB (100 mg Intravenous Given 7/4/22 0433)   sodium chloride 0.9 % flush 10 mL (10 mL Intravenous Given 7/4/22 0949)   sodium chloride 0.9 % flush 10 mL (has no administration in time range)   heparin (porcine) 5000 UNIT/ML injection 5,000 Units (5,000 Units Subcutaneous Given 7/4/22 0948)   acetaminophen (TYLENOL) tablet 650 mg (has no administration in time range)   melatonin tablet 5 mg (has no  administration in time range)   ondansetron (ZOFRAN) injection 4 mg (has no administration in time range)   ipratropium-albuterol (DUO-NEB) nebulizer solution 3 mL (3 mL Nebulization Given 7/4/22 0837)   lactated ringers infusion (50 mL/hr Intravenous New Bag 7/4/22 0352)   traMADol (ULTRAM) tablet 50 mg (has no administration in time range)   cefepime (MAXIPIME) 2 g/100 mL 0.9% NS (mbp) (has no administration in time range)   vancomycin in dextrose 5% 150 mL (VANCOCIN) IVPB 750 mg (has no administration in time range)   ipratropium-albuterol (DUO-NEB) nebulizer solution 3 mL (3 mL Nebulization Given 7/3/22 2308)   HYDROmorphone (DILAUDID) injection 0.25 mg (0.25 mg Intravenous Given 7/3/22 2344)   vancomycin 1750 mg/500 mL 0.9% NS IVPB ([facility]) (1,750 mg Intravenous New Bag 7/4/22 0130)   cefepime (MAXIPIME) 2 g/100 mL 0.9% NS (mbp) (2 g Intravenous New Bag 7/4/22 0353)   metroNIDAZOLE (FLAGYL) IVPB 500 mg (500 mg Intravenous New Bag 7/4/22 0123)   sodium chloride 0.9 % bolus 1,000 mL (0 mL Intravenous Stopped 7/4/22 0124)   methylPREDNISolone sodium succinate (SOLU-Medrol) injection 125 mg (125 mg Intravenous Given 7/4/22 0215)         PROGRESS, DATA ANALYSIS, CONSULTS, AND MEDICAL DECISION MAKING    All labs have been independently reviewed by me.  All radiology studies have been reviewed by me and the radiologist dictating the report.   EKG's have been independently viewed and interpreted by me.          ED Course as of 07/04/22 0956   Sun Jul 03, 2022 2248 In summary this is a 89-year-old man with history of severe COPD, home hospice, [facility] who presents for evaluation of 3 days of worsening cough and shortness of breath with associated chest pain that is made worse by coughing.  He says he is experiencing diffuse chest pain that is worse with coughing and he has felt more short of breath as well.  He has not appreciated any fevers or systemic symptoms, rates his pain as moderate and significantly worse with  coughing.  He was sent home with hospice and has outlived the initial provided timeframe of 2 weeks but his daughter says he felt bad today and told her to take him to the hospital which is the first time he has ever done that.  She says she wants him to receive full medical treatment with the exception of ACLS or intubation.  Patient is alert and oriented and he says the same thing.  No other history given at this time. [CC]   2250 He arrived pale, ill-appearing, hypoxic needing 5 L nasal cannula to maintain good oxygen saturation, coarse rhonchi in all lung fields.  My assumption is this is likely pneumonia.  Initiating full infectious work-up treating with a nebulizer and Dilaudid initially. [CC]      ED Course User Index  [CC] Nicho Vizcarra MD       Labs concerning for acute kidney injury initiating IV fluids and significant elevation in CRP and procalcitonin initiating broad-spectrum antibiotics.  ABG reveals hypoxia and hypercapnia without acidosis.  Full viral panel is negative.  Scattered opacities on chest x-ray without appreciated focal consolidation.  Initial troponin is elevated which I believe is secondary to his acute kidney injury no acute ST elevations or depressions on EKG but treating with aspirin and will trend troponins.  At this point he continues to require significant supplemental oxygen and both he and his family have decided that they want full medical intervention.  Medicine team consulted for admission.    35 minutes of critical care provided. This time excludes other billable procedures. Time does include preparation of documents, medical consultations, review of old records, and direct bedside care. Patient is at high risk for life-threatening deterioration due to acute on chronic respiratory failure with hypoxia and hypercapnia complicated by acute kidney injury managed as full sepsis..       AS OF 09:56 EDT VITALS:    BP - 136/85  HR - 84  TEMP - 98.1 °F (36.7 °C) (Oral)  O2  SATS - 97%                  DIAGNOSIS  Final diagnoses:   Acute on chronic respiratory failure with hypoxia (HCC)   OKSANA (acute kidney injury) (HCC)   Elevated troponin         DISPOSITION  Admit             Nicho Vizcarra MD  07/04/22 1005

## 2022-07-04 NOTE — PROGRESS NOTES
"Pharmacy Consult-Vancomycin Dosing  Cooper Covarrubias is a  89 y.o. male receiving vancomycin therapy.     Indication: sepsis  Consulting Provider: hospitalist  ID Consult: No    Goal AUC: 400 - 600 mg/L*hr    Current Antimicrobial Therapy  Anti-Infectives (From admission, onward)      Ordered     Dose/Rate Route Frequency Start Stop    07/04/22 0221  vancomycin (VANCOCIN) 1000 mg/200 mL dextrose 5% IVPB        Ordering Provider: Jeremiah Solomon, PharmD   \"And\" Linked Group Details    11.6 mg/kg × 86.2 kg Intravenous Every 24 Hours Scheduled 07/05/22 0600 07/11/22 0859    07/04/22 0209  Pharmacy to dose vancomycin        Ordering Provider: Case Hernández,      Does not apply Continuous PRN 07/04/22 0209 07/11/22 0208    07/04/22 0024  metroNIDAZOLE (FLAGYL) IVPB 500 mg        Ordering Provider: Nicho Vizcarra MD    500 mg  over 30 Minutes Intravenous Once 07/04/22 0027 07/04/22 0153    07/04/22 0024  vancomycin 1750 mg/500 mL 0.9% NS IVPB ([facility])        Ordering Provider: Nicho Vizcarra MD    20 mg/kg × 86.2 kg  over 105 Minutes Intravenous Once 07/04/22 0026      07/04/22 0024  cefepime (MAXIPIME) 2 g/100 mL 0.9% NS (mbp)        Ordering Provider: Nicho Vizcarra MD    2 g  200 mL/hr over 30 Minutes Intravenous Once 07/04/22 0026              Allergies  Allergies as of 07/03/2022 - Reviewed 07/03/2022   Allergen Reaction Noted   • Latex Hives 09/14/2017       Labs  Results from last 7 days   Lab Units 07/03/22  2249   BUN mg/dL 41*   CREATININE mg/dL 1.44*     Results from last 7 days   Lab Units 07/03/22  2249   WBC 10*3/mm3 8.74       Evaluation of Dosing  Last Dose Received in the ED/Outside Facility:               Vancomycin 1750 mg IV 7/4 at 01:30  Is Patient on Dialysis or Renal Replacement:               No    Ht - 182.9 cm (72\")  Wt - 86.2 kg (190 lb)    Estimated Creatinine Clearance: 41.6 mL/min (A) (by C-G formula based on SCr of 1.44 mg/dL (H)).  Intake & Output (last 3 days)  "      None            Microbiology and Radiology  Microbiology Results (last 10 days)       ** No results found for the last 240 hours. **            Reported Vancomycin Levels                   [facility] AUC Calculation:  Current AUC:   -- mg/L*hr    Predicted Steady State AUC on Current Dose: -- mg/L*hr  _________________________________  Predicted Steady State AUC on New Dose:   451 mg/L*hr    Assessment/Plan:  1. Will give vancomycin 1750 mg IV once (given in ED 7/4 at 01:30)                               followed by      Vancomycin 750 mg IV q 24 hours    2. Vancomycin trough is scheduled 7/6 at 0600 prior to the 3rd dose. Predicted Steady State AUC at current dose: 451 mg/L*hr.      Jeremiah Solomon, PharmD, BCPS  7/4/2022  02:22 EDT

## 2022-07-04 NOTE — THERAPY EVALUATION
Patient Name: Cooper Covarrubias  : 1931    MRN: [id number]                              Today's Date: 2022       Admit Date: 7/3/2022    Visit Dx:     ICD-10-CM ICD-9-CM   1. Acute on chronic respiratory failure with hypoxia (HCC)  J96.21 518.84     799.02   2. OKSANA (acute kidney injury) ([facility])  N17.9 584.9   3. Elevated troponin  R77.8 790.6     Patient Active Problem List   Diagnosis   • Asthma   • Anxiety   • Urinary incontinence   • BPH with urinary obstruction   • Nocturia   • Arthritis   • Vitamin D deficiency   • Acute exacerbation of COPD with asthma (HCC)   • Conjunctivitis   • Anxiety and depression   • Declining functional status   • Anemia   • Essential hypertension   • Hyponatremia   • SIADH (syndrome of inappropriate ADH production) (HCC)   • Acute on chronic respiratory failure (HCC)   • Elevated troponin   • OKSANA (acute kidney injury) (HCC)   • Sepsis (HCC)     Past Medical History:   Diagnosis Date   • Anxiety    • Arthritis    • Basal cell carcinoma    • Cataract    • COPD (chronic obstructive pulmonary disease) (HCC)    • Hypertension    • Urinary incontinence    • Visual impairment      Past Surgical History:   Procedure Laterality Date   • CATARACT EXTRACTION     • SKIN CANCER EXCISION      , , , [id number]      General Information     Row Name 22 0939          Physical Therapy Time and Intention    Document Type evaluation  -     Mode of Treatment physical therapy  -     Row Name 22 0939          General Information    Patient Profile Reviewed yes  -LM     Prior Level of Function --  Unsure of PLOF d/t pt cognition - per pt he stays in the bed at home  -LM     Existing Precautions/Restrictions fall;other (see comments)  Confusion  -LM     Barriers to Rehab previous functional deficit;cognitive status  -     Row Name 22 0939          Living Environment    People in Home child(terrell), adult  Per chart, pt lives with his daughter  -     Row Name 22  0939          Cognition    Orientation Status (Cognition) oriented to;person;place;disoriented to;situation;time  -LM     Row Name 07/04/22 0939          Safety Issues, Functional Mobility    Safety Issues Affecting Function (Mobility) friction/shear risk;insight into deficits/self-awareness;judgment;problem-solving;safety precaution awareness;safety precautions follow-through/compliance;sequencing abilities  -LM     Impairments Affecting Function (Mobility) balance;cognition;endurance/activity tolerance;range of motion (ROM);strength  -LM           User Key  (r) = Recorded By, (t) = Taken By, (c) = Cosigned By    Initials Name Provider Type    LM Christin Aly PT Physical Therapist               Mobility     Row Name 07/04/22 0944          Bed Mobility    Bed Mobility supine-sit  -LM     Supine-Sit [place] (Bed Mobility) maximum assist (25% patient effort);2 person assist;verbal cues  -LM     Assistive Device (Bed Mobility) bed rails;head of bed elevated  -LM     Comment, (Bed Mobility) Vc's for sequencing.  Requires encouragement that he can do more than he thinks he can.  -LM     Row Name 07/04/22 0944          Sit-Stand Transfer    Sit-Stand [place] (Transfers) moderate assist (50% patient effort);2 person assist;verbal cues  -LM     Assistive Device (Sit-Stand Transfers) walker, front-wheeled  -LM     Comment, (Sit-Stand Transfer) Stood from EOB.  Vc's for hand placement and sequencing.  -LM     Row Name 07/04/22 0944          Gait/Stairs (Locomotion)    [place] Level (Gait) not tested  -LM     Comment, (Gait/Stairs) Per pt he is nonambulatory at baseline.  Per chart review from a month ago, pt was ambulating independently with a cane.  -LM           User Key  (r) = Recorded By, (t) = Taken By, (c) = Cosigned By    Initials Name Provider Type    Christin Edwards PT Physical Therapist               Obj/Interventions     Row Name 07/04/22 0988          Range of Motion Comprehensive    General  Range of Motion lower extremity range of motion deficits identified  -LM     Comment, General Range of Motion BLEs - Decreased active hip flex - AAROM WFL;  Knee flex/ext AROM WFL with increased time; Decreased active ankle DF - AAROM WFL  -LM     Row Name 07/04/22 0945          Strength Comprehensive (MMT)    General Manual Muscle Testing (MMT) Assessment lower extremity strength deficits identified  -LM     Comment, General Manual Muscle Testing (MMT) Assessment BLEs - Hip flex - 2+/5; Knee flex/ext - 4-/5; Ankle DF - 2/5  -LM     Row Name 07/04/22 0945          Balance    Balance Assessment sitting static balance;standing static balance  -LM     Static Sitting Balance standby assist  -LM     Position, Sitting Balance supported;sitting edge of bed  -LM     Static Standing Balance moderate assist;2-person assist;verbal cues  -LM     Position/Device Used, Standing Balance supported;walker, front-wheeled  -LM     Comment, Balance Initially required Carlie for static sitting balance but progressed to SBA.  -LM     Row Name 07/04/22 0945          Sensory Assessment (Somatosensory)    Sensory Assessment (Somatosensory) not tested  -LM           User Key  (r) = Recorded By, (t) = Taken By, (c) = Cosigned By    Initials Name Provider Type    LM Christin Aly, PT Physical Therapist               Goals/Plan     Row Name 07/04/22 0909          Bed Mobility Goal 1 (PT)    Activity/Assistive Device (Bed Mobility Goal 1, PT) sit to supine/supine to sit  -LM     Combined [place] Level/Cues Needed (Bed Mobility Goal 1, PT) standby assist  -LM     Time Frame (Bed Mobility Goal 1, PT) long term goal (LTG);2 weeks  -LM     Row Name 07/04/22 0916          Transfer Goal 1 (PT)    Activity/Assistive Device (Transfer Goal 1, PT) bed-to-chair/chair-to-bed  -LM     Combined [place] Level/Cues Needed (Transfer Goal 1, PT) standby assist  -LM     Time Frame (Transfer Goal 1, PT) long term goal (LTG);2 weeks  -LM     Row Name 07/04/22 0975           Gait Training Goal 1 (PT)    Activity/Assistive Device (Gait Training Goal 1, PT) gait (walking locomotion);assistive device use  -LM     [place] Level (Gait Training Goal 1, PT) minimum assist (75% or more patient effort)  -LM     Distance (Gait Training Goal 1, PT) 20 feet  -LM     Time Frame (Gait Training Goal 1, PT) long term goal (LTG);2 weeks  -     Row Name 07/04/22 0954          Therapy Assessment/Plan (PT)    Planned Therapy Interventions (PT) balance training;bed mobility training;gait training;home exercise program;motor coordination training;neuromuscular re-education;patient/family education;postural re-education;ROM (range of motion);stair training;strengthening;stretching;transfer training  -           User Key  (r) = Recorded By, (t) = Taken By, (c) = Cosigned By    Initials Name Provider Type    Christin Edwards, PT Physical Therapist               Clinical Impression     Row Name 07/04/22 0947          Pain    Additional Documentation Pain Scale: FACES Pre/Post-Treatment (Group)  -[facility] Name 07/04/22 0947          Pain Scale: FACES Pre/Post-Treatment    Pain: FACES Scale, Pretreatment 0-->no hurt  -LM     Posttreatment Pain Rating 0-->no hurt  -LM     Row Name 07/04/22 0947          Plan of Care Review    Plan of Care Reviewed With patient  -LM     Outcome Evaluation PT evaluation completed.  Pt pleasantly confused - requires encouragement and reassurance with mobility.  Pt transferred supine-->sit with MaxAx2 and stood using rw with ModAx2.  Skilled PT services warranted to improve mobility and safety.  Pt would benefit from further therapy at [facility] prior to d/c home with daughter.  -     Row Name 07/04/22 0947          Therapy Assessment/Plan (PT)    Rehab Potential (PT) fair, will monitor progress closely  -LM     Criteria for Skilled Interventions Met (PT) yes;meets criteria;skilled treatment is necessary  -     Therapy Frequency (PT) daily  -     Row Name 07/04/22 0937           Vital Signs    Post Systolic BP Rehab 136  -LM     Post Treatment Diastolic BP 85  -LM     Posttreatment Heart Rate (beats/min) 93  -LM     Pre SpO2 (%) 96  -LM     O2 Delivery Pre Treatment supplemental O2  -LM     Post SpO2 (%) 94  -LM     O2 Delivery Post Treatment supplemental O2  -LM     Pre Patient Position Supine  -LM     Post Patient Position Sitting  -LM     Row Name 07/04/22 0947          Positioning and Restraints    Pre-Treatment Position in bed  -LM     Post Treatment Position bed  -LM     In Bed sitting EOB;with OT  -LM     In Chair --  -LM           User Key  (r) = Recorded By, (t) = Taken By, (c) = Cosigned By    Initials Name Provider Type    LM Christin Aly, SAMY Physical Therapist               Outcome Measures     Row Name 07/04/22 0954 07/04/22 0400       How much help from another person do you currently need...    Turning from your back to your side while in flat bed without using bedrails? 2  -LM 3  -JL    Moving from lying on back to sitting on the side of a flat bed without bedrails? 2  -LM 2  -JL    Moving to and from a bed to a chair (including a wheelchair)? 2  -LM 2  -JL    Standing up from a chair using your arms (e.g., wheelchair, bedside chair)? 2  -LM 1  -JL    Climbing 3-5 steps with a railing? 1  -LM 1  -JL    To walk in hospital room? 1  -LM 1  -JL    AM-PAC 6 Clicks Score (PT) 10  -LM 10  -JL    Highest level of mobility 4 --> Transferred to chair/commode  -LM 4 --> Transferred to chair/commode  -JL    Row Name 07/04/22 0954          Functional Assessment    Outcome Measure Options AM-PAC 6 Clicks Basic Mobility (PT)  -LM           User Key  (r) = Recorded By, (t) = Taken By, (c) = Cosigned By    Initials Name Provider Type    Christin Edwards, SAMY Physical Therapist    Cyndi Hill RN Registered Nurse                             Physical Therapy Education                 Title: PT OT SLP Therapies (In Progress)     Topic: Physical Therapy (In Progress)     Point:  Mobility training (In Progress)     Learning Progress Summary           Patient Acceptance, E, NR by LM at 7/4/2022 0954                   Point: Home exercise program (Not Started)     Learner Progress:  Not documented in this visit.          Point: Body mechanics (Not Started)     Learner Progress:  Not documented in this visit.          Point: Precautions (In Progress)     Learning Progress Summary           Patient Acceptance, E, NR by LM at 7/4/2022 0954                               User Key     Initials Effective Dates Name Provider Type Discipline     06/16/21 -  Christin Aly, SAMY Physical Therapist PT              PT Recommendation and Plan  Planned Therapy Interventions (PT): balance training, bed mobility training, gait training, home exercise program, motor coordination training, neuromuscular re-education, patient/family education, postural re-education, ROM (range of motion), stair training, strengthening, stretching, transfer training  Plan of Care Reviewed With: patient  Outcome Evaluation: PT evaluation completed.  Pt pleasantly confused - requires encouragement and reassurance with mobility.  Pt transferred supine-->sit with MaxAx2 and stood using rw with ModAx2.  Skilled PT services warranted to improve mobility and safety.  Pt would benefit from further therapy at SNF prior to d/c home with daughter.     Time Calculation:    PT Charges     Row Name 07/04/22 0959             Time Calculation    Start Time 0835  -LM      PT Received On 07/04/22  -LM      PT Goal Re-Cert Due Date 07/14/22  -LM              Untimed Charges    PT Eval/Re-eval Minutes 46  -LM              Total Minutes    Untimed Charges Total Minutes 46  -LM       Total Minutes 46  -LM            User Key  (r) = Recorded By, (t) = Taken By, (c) = Cosigned By    Initials Name Provider Type     Christin Aly, PT Physical Therapist              Therapy Charges for Today     Code Description Service Date Service Provider Modifiers Qty     74477185359 HC PT EVAL MOD COMPLEXITY 4 7/4/2022 Christin Aly, PT GP 1          PT G-Codes  Outcome Measure Options: AM-PAC 6 Clicks Basic Mobility (PT)  AM-PAC 6 Clicks Score (PT): 10    Christin Aly, PT  7/4/2022

## 2022-07-04 NOTE — THERAPY EVALUATION
Acute Care - Speech Language Pathology   Swallow Initial Evaluation [facility]   Clinical Swallow Evaluation     Patient Name: Cooper Covarrubias  : 1931  MRN: [id number]  Today's Date: 2022               Admit Date: 7/3/2022    Visit Dx:     ICD-10-CM ICD-9-CM   1. Acute on chronic respiratory failure with hypoxia ([facility])  J96.21 518.84     799.02   2. OKSANA (acute kidney injury) ([facility])  N17.9 584.9   3. Elevated troponin  R77.8 790.6   4. Dysphagia, unspecified type  R13.10 787.20     Patient Active Problem List   Diagnosis   • Asthma   • Anxiety   • Urinary incontinence   • BPH with urinary obstruction   • Nocturia   • Arthritis   • Vitamin D deficiency   • Acute exacerbation of COPD with asthma ([facility])   • Conjunctivitis   • Anxiety and depression   • Declining functional status   • Anemia   • Essential hypertension   • Hyponatremia   • SIADH (syndrome of inappropriate ADH production) ([facility])   • Acute on chronic respiratory failure (HCC)   • Elevated troponin   • OKSANA (acute kidney injury) ([facility])   • Sepsis ([facility])     Past Medical History:   Diagnosis Date   • Anxiety    • Arthritis    • Basal cell carcinoma    • Cataract    • COPD (chronic obstructive pulmonary disease) ([facility])    • Hypertension    • Urinary incontinence    • Visual impairment      Past Surgical History:   Procedure Laterality Date   • CATARACT EXTRACTION     • SKIN CANCER EXCISION      , , , [id number]       SLP Recommendation and Plan  SLP Swallowing Diagnosis: R/O pharyngeal dysphagia (22 121)  SLP Diet Recommendation: puree with some mashed, thin liquids (22 1215)  Recommended Precautions and Strategies: upright posture during/after eating, general aspiration precautions, assist with feeding (22 1215)  SLP Rec. for Method of Medication Administration: meds crushed, with pudding or applesauce, as tolerated (22 1215)     Monitor for Signs of Aspiration: yes, notify SLP if any concerns (22  1215)  Recommended Diagnostics: reassess via clinical swallow evaluation, other (see comments) (consider instrumental swallow study, pending further [facility] decisions) (07/04/22 1215)  Swallow Criteria for Skilled Therapeutic Interventions Met: demonstrates skilled criteria (07/04/22 1215)  Anticipated Discharge Disposition (SLP): anticipate therapy at next level of care (07/04/22 1215)  Rehab Potential/Prognosis, Swallowing: re-evaluate goals as necessary (07/04/22 1215)                                        Plan of Care Reviewed With: patient  Progress: no change      SWALLOW EVALUATION (last 72 hours)     SLP Adult Swallow Evaluation     Row Name 07/04/22 1215                   Rehab Evaluation    Document Type evaluation  -AC        Subjective Information no complaints  -AC        Patient Observations alert;cooperative  -AC        Patient/Family/Caregiver Comments/Observations No family present.  -AC        Patient Effort good  -AC                  General Information    Patient Profile Reviewed yes  -AC        Pertinent History Of Current Problem Re-adm w/ a/c respiratory failure. Hx COPD, asthma, dementia.  -AC        Current Method of Nutrition NPO  -AC        Precautions/Limitations, Vision WFL;for purposes of eval  -AC        Precautions/Limitations, Hearing hearing impairment, bilaterally;other (see comments)  provided sound amplification to R ear--appeared to help  -AC        Prior Level of Function-Communication cognitive-linguistic impairment  -AC        Prior Level of Function-Swallowing no diet consistency restrictions  -AC        Plans/Goals Discussed with patient;agreed upon  -AC        Barriers to Rehab medically complex;cognitive status  -AC        Patient's Goals for Discharge patient did not state  -AC                  Pain Scale: FACES Pre/Post-Treatment    Pain: FACES Scale, Pretreatment 0-->no hurt  -AC        Posttreatment Pain Rating 0-->no hurt  -AC                  Oral Motor Structure and  Function    Dentition Assessment edentulous, dentures not available  -AC        Secretion Management WNL/WFL  -AC        Mucosal Quality dry  -AC                  Oral Musculature and Cranial Nerve Assessment    Oral Motor General Assessment generalized oral motor weakness  -AC                  General Eating/Swallowing Observations    Respiratory Support Currently in Use nasal cannula;other (see comments)  4L  -AC        Eating/Swallowing Skills fed by SLP;self-fed;other (see comments)  needed assist  -AC        Positioning During Eating upright 90 degree;upright in chair  -AC        Utensils Used spoon;cup;straw  -AC        Consistencies Trialed thin liquids;nectar/syrup-thick liquids;pureed;soft textures  -AC                  Clinical Swallow Eval    Oral Prep Phase impaired  -AC        Oral Transit WFL  -AC        Oral Residue WFL  -AC        Clinical Swallow Evaluation Summary Increased oral prep time 2' missing dentures. Audible swallows w/ all consistencies, but otherwise no overt clinical s/sxs aspiration. Pt indicated occasional difficulty w/ solids/liquids.  -AC                  Oral Prep Concerns    Oral Prep Concerns increased prep time  -AC        Increased Prep Time mechanical soft  -AC                  Swallowing Quality of Life Assessment    Aversion noted with  nectar  -AC                  SLP Evaluation Clinical Impression    SLP Swallowing Diagnosis R/O pharyngeal dysphagia  -        Functional Impact risk of aspiration/pneumonia  -        Rehab Potential/Prognosis, Swallowing re-evaluate goals as necessary  -        Swallow Criteria for Skilled Therapeutic Interventions Met demonstrates skilled criteria  -AC                  Recommendations    SLP Diet Recommendation puree with some mashed;thin liquids  -        Recommended Diagnostics reassess via clinical swallow evaluation;other (see comments)  consider instrumental swallow study, pending further [facility] decisions  -        Recommended  Precautions and Strategies upright posture during/after eating;general aspiration precautions;assist with feeding  -AC        Oral Care Recommendations Oral Care BID/PRN  -AC        SLP Rec. for Method of Medication Administration meds crushed;with pudding or applesauce;as tolerated  -        Monitor for Signs of Aspiration yes;notify SLP if any concerns  -AC        Anticipated Discharge Disposition (SLP) anticipate therapy at next level of care  -              User Key  (r) = Recorded By, (t) = Taken By, (c) = Cosigned By    Initials Name Effective Dates     Padmini Turcios MS CCC-SLP 06/16/21 -                 EDUCATION  The patient has been educated in the following areas:   Dysphagia (Swallowing Impairment) Oral Care/Hydration Modified Diet Instruction.              Time Calculation:    Time Calculation- SLP     Row Name 07/04/22 1301             Time Calculation- SLP    SLP Start Time 1215  -      SLP Received On 07/04/22  -              Untimed Charges    99000-SM Eval Oral Pharyng Swallow Minutes 49  -AC              Total Minutes    Untimed Charges Total Minutes 49  -AC       Total Minutes 49  -AC            User Key  (r) = Recorded By, (t) = Taken By, (c) = Cosigned By    Initials Name Provider Type     Padmini Turcios MS CCC-SLP Speech and Language Pathologist                Therapy Charges for Today     Code Description Service Date Service Provider Modifiers Qty    66250384255 HC ST EVAL ORAL PHARYNG SWALLOW 3 7/4/2022 Padmini Turcios MS CCC-SLP GN 1        Patient was not wearing a face mask and did not exhibit coughing during this therapy encounter.  Procedure performed was aerosolizing, involved close contact (within 6 feet for at least 15 minutes or longer), and did not involve contact with infectious secretions or specimens.  Therapist used appropriate personal protective equipment including gloves, standard procedure mask and eye protection.  Appropriate PPE was worn during the entire  therapy session.  Hand hygiene was completed before and after therapy session.          Padmini Turcios, MS CCC-SLP  7/4/2022

## 2022-07-04 NOTE — PLAN OF CARE
Problem: Adult Inpatient Plan of Care  Goal: Plan of Care Review  Recent Flowsheet Documentation  Taken 7/4/2022 1000 by Binu Gregg OT  Progress: (OT eval) no change  Plan of Care Reviewed With: patient  Outcome Evaluation: OT eval completed. ADL completion limited by increased confusion, generalized weakness (BLE>BUE), and decreased functional endurance warranting skilled IP OT services to promote return to PLOF. MaxA w/2 for squat pivot to recliner, dependent for donning socks, Min-ModA for grooming tasks. Rec d/c to SNF rehab.

## 2022-07-04 NOTE — PROGRESS NOTES
[facility] Medicine Services  ADMISSION FOLLOW-UP NOTE          Patient admitted after midnight, H&P by my partner performed earlier on today's date reviewed.  Interim findings, labs, and charting also reviewed.        [facility] Hospital Problem List has been managed and updated to include any new diagnoses:  Active Hospital Problems    Diagnosis  POA   • **Acute on chronic respiratory failure (HCC) [J96.20]  Yes   • Elevated troponin [R77.8]  Yes   • OKSANA (acute kidney injury) ([facility]) [N17.9]  Yes   • Sepsis (HCC) [A41.9]  Yes   • Anemia [D64.9]  Yes   • Essential hypertension [I10]  Yes   • Anxiety and depression [F41.9, F32.A]  Yes   • BPH with urinary obstruction [N40.1, N13.8]  Yes   • Acute exacerbation of COPD with asthma ([facility]) [J44.1, J45.901]  Yes      Resolved Hospital Problems   No resolved problems to display.         ADDITIONAL PLAN:  - detailed assessment and plan from admission reviewed  - to summarize Mr. Leyva was on hospice in June. has acute on chronic respiratory failure AECOPD (solumedrol, nebs), ?PNA receiving broad spectrum antibiotics. He also has 15% bands and hypoxia concerning for sepsis. Concern for NSTEMI due to demand ischemia, very mild troponin rise. Concern for mild OKSANA Cr 1.42 from 1.44 baseline around 0.95 to 1.2. Was receiving LR at 50 now dc'ed. Concern with vancomycin with kidneys. MRSA swab pending. If negative will DC vancomycin. He is DNI. He has a palliative care consult. O2 sat this am is 97% on 4 liters, vitals are stable and he is afebrile. CXR is read as clear. UA shows too numerous to count WBC and RBC. He does have a chronic chandler which we will make sure was replaced. He also recently had klebsiella UTI per records. I discussed with nurse, she confirmed the chandler was changed. Mr. Covarrubias is confused for me at the time of my exam perseverating about the warranty on his air conditioner and wanting to get treatment started. I'm not certain what  his baseline is. I told nursing to let me know when family is here and we can talk with them as well. He's stable right now. Will continue treatments from admission and await further goal clarification from palliative.    Hudson Sandoval,   07/04/22

## 2022-07-04 NOTE — PLAN OF CARE
Goal Outcome Evaluation:  Plan of Care Reviewed With: patient           Outcome Evaluation: PT evaluation completed.  Pt pleasantly confused - requires encouragement and reassurance with mobility.  Pt transferred supine-->sit with MaxAx2 and stood using rw with ModAx2.  Skilled PT services warranted to improve mobility and safety.  Pt would benefit from further therapy at SNF prior to d/c home with daughter.

## 2022-07-04 NOTE — H&P
[facility] Medicine Services  HISTORY AND PHYSICAL    Patient Name: Cooper Covarrubias  : 1931  MRN: [id number]  Primary Care Physician: Lb Nielsen MD  Date of admission: 7/3/2022    Subjective   Subjective     Chief Complaint:  Cough, chest pain     HPI:  Cooper Covarrubias is a 89 y.o. male with PMH significant for BPH with urinary retention, COPD on 2 LNC baseline, anxiety/depression, SIADH, who presents to the ED with complaint of cough and chest pain.    HPI is provided per patient and family (daughters) at bedside. Patient has had multiple recent admissions to [facility] secondary to declining functional status as well as hyponatremia.  Most recent admission revealed sepsis secondary to Klebsiella UTI.  He was ultimately discharged home with hospice on 6/3/2022.    He returns tonight with complaint of cough and chest pain.  His daughters note that his cough started 2 days ago and significantly worsened throughout the day today.  He notes that he began to have chest pain associated with a cough today.  He denies associated fever, chills, nausea, vomiting, diarrhea.  His daughter states that the cough has been productive today with light gray transitioning to dark gray sputum.    Upon arrival to the ED, he was requiring 5 LNC to maintain adequate oxygen saturation.  He has since been transitioned down to 3 LNC.  He normally wears 2 LNC at home.  He was noted to have elevated troponin 0.035, elevated creatinine 1.44, CRP 20.21, procalcitonin 0.53.  His H&H is stable.  CXR was negative for acute findings.  He was started on cefepime, Flagyl, and vancomycin while in the ED as well as given nebulizer treatment and Solu-Medrol.  He reports feeling improved but continues to have cough.  He will be admitted to hospital medicine for further evaluation.    Review of Systems   Constitutional: Positive for activity change and appetite change. Negative for chills, diaphoresis, fatigue and fever.    HENT: Negative.    Eyes: Negative.  Negative for visual disturbance.   Respiratory: Positive for cough, chest tightness, shortness of breath and wheezing.    Cardiovascular: Positive for chest pain. Negative for palpitations and leg swelling.   Gastrointestinal: Negative.  Negative for abdominal distention, constipation, diarrhea, nausea and vomiting.   Endocrine: Negative.    Genitourinary: Negative for difficulty urinating, dysuria, frequency and urgency.        Moody catheter in place   Musculoskeletal: Positive for gait problem. Negative for arthralgias, back pain and myalgias.   Skin: Negative.  Negative for color change, pallor, rash and wound.   Allergic/Immunologic: Negative.  Negative for immunocompromised state.   Neurological: Positive for weakness. Negative for dizziness, seizures, facial asymmetry, speech difficulty, light-headedness and headaches.   Hematological: Negative.  Does not bruise/bleed easily.   Psychiatric/Behavioral: Negative.  Negative for confusion. The patient is not nervous/anxious.         All other systems reviewed and are negative.     Personal History     Past Medical History:   Diagnosis Date   • Anxiety    • Arthritis    • Basal cell carcinoma    • Cataract    • COPD (chronic obstructive pulmonary disease) (HCC)    • Hypertension    • Urinary incontinence    • Visual impairment        Past Surgical History:   Procedure Laterality Date   • CATARACT EXTRACTION     • SKIN CANCER EXCISION      1986, 1999, [address], [id number]       Family History:  family history includes Cancer in his brother and father; No Known Problems in his mother. Otherwise pertinent FHx was reviewed and unremarkable.     Social History:  reports that he has quit smoking. He has a 60.00 pack-year smoking history. He has never used smokeless tobacco. He reports that he does not drink alcohol and does not use drugs.  Social History     Social History Narrative    Uses walker for ambulation    Lives with daughter        Medications:  AeroChamber Plus Sudarshan-Vu, QUEtiapine, acetaminophen, albuterol sulfate HFA, ammonium lactate, artificial tears, azelastine, fluticasone-salmeterol, hydrALAZINE, ipratropium-albuterol, levothyroxine, loperamide, melatonin, tamsulosin, and triamcinolone    Allergies   Allergen Reactions   • Latex Hives       Objective   Objective     Vital Signs:   Temp:  [98.5 °F (36.9 °C)-98.6 °F (37 °C)] 98.6 °F (37 °C)  Heart Rate:  [68-80] 68  Resp:  [12-16] 12  BP: (104-148)/(60-82) 135/75  Flow (L/min):  [4] 4    Physical Exam   Constitutional: Awake, alert, chronically ill-appearing, no distress  Eyes: PERRLA, sclerae anicteric, no conjunctival injection  HENT: NCAT, mucous membranes moist  Neck: Supple, no thyromegaly, no lymphadenopathy, trachea midline  Respiratory: Scattered rhonchi, expiratory wheeze upper lobes, nonlabored respirations, 3 LNC in place  Cardiovascular: RRR, no murmurs, rubs, or gallops, palpable pedal pulses bilaterally  Gastrointestinal: Positive bowel sounds, soft, nontender, nondistended  Musculoskeletal: No bilateral ankle edema, no clubbing or cyanosis to extremities  Psychiatric: Appropriate affect, cooperative  Neurologic: Oriented x 3, strength symmetric in all extremities, Cranial Nerves grossly intact to confrontation, speech clear  Skin: No rashes.      Results Reviewed:  I have personally reviewed most recent indicated data and agree with findings including:  [x]  Laboratory  [x]  Radiology  [x]  EKG/Telemetry  []  Pathology  []  Cardiac/Vascular Studies  [x]  Old records  []  Other:  Most pertinent findings include:      LAB RESULTS:      Lab 07/03/22  2249   WBC 8.74   HEMOGLOBIN 10.1*   HEMATOCRIT 33.1*   PLATELETS 192   NEUTROS ABS 7.25*   EOS ABS 0.00   MCV 82.3   CRP 20.21*   PROCALCITONIN 0.53*   LACTATE 1.0         Lab 07/03/22  2249   SODIUM 145   POTASSIUM 3.9   CHLORIDE 107   CO2 29.0   ANION GAP 9.0   BUN 41*   CREATININE 1.44*   EGFR 46.2*   GLUCOSE 115*    CALCIUM 9.0   MAGNESIUM 2.0   PHOSPHORUS 3.0         Lab 07/03/22  2249   TOTAL PROTEIN 6.0   ALBUMIN 2.50*   GLOBULIN 3.5   ALT (SGPT) 11   AST (SGOT) 14   BILIRUBIN 0.5   ALK PHOS 139*   LIPASE 21         Lab 07/03/22  2249   PROBNP 716.0   TROPONIN T 0.035*                 Lab 07/03/22  2307   PH, ARTERIAL 7.450   PCO2, ARTERIAL 45.4*   PO2 ART 81.9*   FIO2 32   HCO3 ART 31.5*   BASE EXCESS ART 6.7*   CARBOXYHEMOGLOBIN 0.6     Brief Urine Lab Results  (Last result in the past 365 days)      Color   Clarity   Blood   Leuk Est   Nitrite   Protein   CREAT   Urine HCG        05/29/22 1941 Yellow   Clear   Negative   Small (1+)   Positive   Negative               Microbiology Results (last 10 days)     ** No results found for the last 240 hours. **          XR Chest 1 View    Result Date: 7/3/2022  FRONTAL VIEW OF THE CHEST CLINICAL INDICATION: Dyspnea. COMPARISON: 5/21/2022. FINDINGS: No focal consolidation, pleural effusion or pneumothorax. Cardiomediastinal morphology is normal.     Impression: No acute cardiopulmonary abnormality. Electronically signed by:  Daniel Robledo M.D.  7/3/2022 9:43 PM Mountain Time          Assessment & Plan   Assessment & Plan       Acute on chronic respiratory failure (HCC)    BPH with urinary obstruction    Acute exacerbation of COPD with asthma (HCC)    Anxiety and depression    Anemia    Essential hypertension    Elevated troponin    OKSANA (acute kidney injury) (HCC)    89 y.o. male with PMH significant for BPH with urinary retention, COPD on 2 LNC baseline, anxiety/depression, SIADH, who presents to the ED with complaint of cough and chest pain who is found to have concern COPD exacerbation and PNA.    Sepsis: hypoxia, elevated CRP, elevated procal. Source:  Acute on Chronic Respiratory Failure with hypoxia   COPD exacerbation with concern for underlying PNA   -Vancomycin, Cefepime, flagyl given in ED, will continue Vancomycin, Cefepime, and Doxycycline   -Sputum cx   -BC x  2  -Scheduled and PRN duonebs   -Solumedrol 125mg given in ED, continue 60mg q 8 hour  -PNA urine antigens   -CBC, BMP in am   -MRSA PCR   -previously discharged home with hospice, now wants to escalate from comfort care, continues to be DNR/DNI    Elevated troponin   -Likely NSTEMI II secondary to hypoxic ischemic demand   -trend troponin  -trend EKG     OKSANA   -gentle IVF overnight   -BMP in am     BPH  Chronic Urinary retention   -Moody cath in place, replace and continue   -continue flomax     Hypertension   -continue hydralazine       Hypothyroid   -continue levothyroxine     DVT prophylaxis:  Heparin     CODE STATUS:    Medical Intervention Limits: NO intubation (DNI)  Level Of Support Discussed With: Patient  Code Status (Patient has no pulse and is not breathing): No CPR (Do Not Attempt to Resuscitate)  Medical Interventions (Patient has pulse or is breathing): Limited Support      This note has been completed as part of a split-shared workflow.     Signature:Electronically signed by DANNY Leggett, 07/04/22, 2:47 AM EDT.        Attending   Admission Attestation       I have seen and examined the patient, performing an independent face-to-face diagnostic evaluation with plan of care reviewed and developed with the advanced practice clinician (APC).      Brief Summary Statement:   Cooper Covarrubias is a 89 y.o. male with past medical history of SIADH, anxiety/depression, BPH with urinary retention status post Moody placement, COPD on chronic 2 L nasal cannula presents to the ER with complaints of cough chest pain.    Patient was recently placed under hospice services in June after multiple admissions with his most recent being related to sepsis from Klebsiella UTI.  Patient returns tonight with several day history of cough with gray sputum production and increasing shortness of breath.  Over the past 24 hours, patient has had intermittent chest pains with coughing.  He denies any fever or chills.  He was  noted to have increasing oxygen requirements from baseline 2 L up to 3 to 5 L while in the ER.    Currently, patient feels like his shortness of breath is improved and currently 5 out of 10 in severity.  At its worst was 8 out of 10 in severity.  Work-up concerning for recurrent sepsis given bandemia of 15%.  Patient started on broad-spectrum antibiotics, Solu-Medrol, and duo nebs    Remainder of detailed HPI is as noted by APC and has been reviewed and/or edited by me for completeness.    Attending Physical Exam:  Constitutional: Awake, alert, appears chronically ill  Eyes: PERRLA, sclerae anicteric, no conjunctival injection  HENT: NCAT, mucous membranes moist  Neck: Supple, no thyromegaly, no lymphadenopathy, trachea midline  Respiratory: rhonchi and wheezing bilaterally, mild to mod labored respirations   Cardiovascular: RRR, no murmurs, rubs, or gallops, palpable pedal pulses bilaterally  Gastrointestinal: Positive bowel sounds, soft, nontender, nondistended  Musculoskeletal: No bilateral ankle edema, no clubbing or cyanosis to extremities  Psychiatric: Appropriate affect, cooperative  Neurologic: Oriented x 3, strength symmetric in all extremities, Cranial Nerves grossly intact to confrontation, speech clear  Skin: No rashes      Brief Assessment/Plan :  See detailed assessment and plan developed with APC which I have reviewed and/or edited for completeness.        Admission Status: I believe that this patient meets INPATIENT status due to acute on chronic hypoxic resp failure, copd exacerbation.  I feel patient’s risk for adverse outcomes and need for care warrant INPATIENT evaluation and I predict the patient’s care encounter to likely last beyond 2 midnights.        Case Hernández,   07/04/22

## 2022-07-04 NOTE — PROGRESS NOTES
" visited patient per palliative care referral.  Patient has 3 daughters, 2 present at bedside during visit.  When asked what has given him strength and meaning in his life, he said, \"With the Good Lord's help\".  Daughters added that he is a member of [employer] and the  has been by his house frequently to visit. Patient reads his Bible daily but had forgotten to bring it to the hospital.   gave him a Bible.  Patient and family appreciative of empathic and narrative listening (and Bible.)  "

## 2022-07-04 NOTE — H&P (VIEW-ONLY)
[facility] Medicine Services  HISTORY AND PHYSICAL    Patient Name: Cooper Covarrubias  : 1931  MRN: [id number]  Primary Care Physician: Lb Nielsen MD  Date of admission: 7/3/2022    Subjective   Subjective     Chief Complaint:  Cough, chest pain     HPI:  Cooper Covarrubias is a 89 y.o. male with PMH significant for BPH with urinary retention, COPD on 2 LNC baseline, anxiety/depression, SIADH, who presents to the ED with complaint of cough and chest pain.    HPI is provided per patient and family (daughters) at bedside. Patient has had multiple recent admissions to [facility] secondary to declining functional status as well as hyponatremia.  Most recent admission revealed sepsis secondary to Klebsiella UTI.  He was ultimately discharged home with hospice on 6/3/2022.    He returns tonight with complaint of cough and chest pain.  His daughters note that his cough started 2 days ago and significantly worsened throughout the day today.  He notes that he began to have chest pain associated with a cough today.  He denies associated fever, chills, nausea, vomiting, diarrhea.  His daughter states that the cough has been productive today with light gray transitioning to dark gray sputum.    Upon arrival to the ED, he was requiring 5 LNC to maintain adequate oxygen saturation.  He has since been transitioned down to 3 LNC.  He normally wears 2 LNC at home.  He was noted to have elevated troponin 0.035, elevated creatinine 1.44, CRP 20.21, procalcitonin 0.53.  His H&H is stable.  CXR was negative for acute findings.  He was started on cefepime, Flagyl, and vancomycin while in the ED as well as given nebulizer treatment and Solu-Medrol.  He reports feeling improved but continues to have cough.  He will be admitted to hospital medicine for further evaluation.    Review of Systems   Constitutional: Positive for activity change and appetite change. Negative for chills, diaphoresis, fatigue and fever.    HENT: Negative.    Eyes: Negative.  Negative for visual disturbance.   Respiratory: Positive for cough, chest tightness, shortness of breath and wheezing.    Cardiovascular: Positive for chest pain. Negative for palpitations and leg swelling.   Gastrointestinal: Negative.  Negative for abdominal distention, constipation, diarrhea, nausea and vomiting.   Endocrine: Negative.    Genitourinary: Negative for difficulty urinating, dysuria, frequency and urgency.        Moody catheter in place   Musculoskeletal: Positive for gait problem. Negative for arthralgias, back pain and myalgias.   Skin: Negative.  Negative for color change, pallor, rash and wound.   Allergic/Immunologic: Negative.  Negative for immunocompromised state.   Neurological: Positive for weakness. Negative for dizziness, seizures, facial asymmetry, speech difficulty, light-headedness and headaches.   Hematological: Negative.  Does not bruise/bleed easily.   Psychiatric/Behavioral: Negative.  Negative for confusion. The patient is not nervous/anxious.         All other systems reviewed and are negative.     Personal History     Past Medical History:   Diagnosis Date   • Anxiety    • Arthritis    • Basal cell carcinoma    • Cataract    • COPD (chronic obstructive pulmonary disease) (HCC)    • Hypertension    • Urinary incontinence    • Visual impairment        Past Surgical History:   Procedure Laterality Date   • CATARACT EXTRACTION     • SKIN CANCER EXCISION      1986, 1999, [address]1004, [id number]       Family History:  family history includes Cancer in his brother and father; No Known Problems in his mother. Otherwise pertinent FHx was reviewed and unremarkable.     Social History:  reports that he has quit smoking. He has a 60.00 pack-year smoking history. He has never used smokeless tobacco. He reports that he does not drink alcohol and does not use drugs.  Social History     Social History Narrative    Uses walker for ambulation    Lives with daughter        Medications:  AeroChamber Plus Sudarshan-Vu, QUEtiapine, acetaminophen, albuterol sulfate HFA, ammonium lactate, artificial tears, azelastine, fluticasone-salmeterol, hydrALAZINE, ipratropium-albuterol, levothyroxine, loperamide, melatonin, tamsulosin, and triamcinolone    Allergies   Allergen Reactions   • Latex Hives       Objective   Objective     Vital Signs:   Temp:  [98.5 °F (36.9 °C)-98.6 °F (37 °C)] 98.6 °F (37 °C)  Heart Rate:  [68-80] 68  Resp:  [12-16] 12  BP: (104-148)/(60-82) 135/75  Flow (L/min):  [4] 4    Physical Exam   Constitutional: Awake, alert, chronically ill-appearing, no distress  Eyes: PERRLA, sclerae anicteric, no conjunctival injection  HENT: NCAT, mucous membranes moist  Neck: Supple, no thyromegaly, no lymphadenopathy, trachea midline  Respiratory: Scattered rhonchi, expiratory wheeze upper lobes, nonlabored respirations, 3 LNC in place  Cardiovascular: RRR, no murmurs, rubs, or gallops, palpable pedal pulses bilaterally  Gastrointestinal: Positive bowel sounds, soft, nontender, nondistended  Musculoskeletal: No bilateral ankle edema, no clubbing or cyanosis to extremities  Psychiatric: Appropriate affect, cooperative  Neurologic: Oriented x 3, strength symmetric in all extremities, Cranial Nerves grossly intact to confrontation, speech clear  Skin: No rashes.      Results Reviewed:  I have personally reviewed most recent indicated data and agree with findings including:  [x]  Laboratory  [x]  Radiology  [x]  EKG/Telemetry  []  Pathology  []  Cardiac/Vascular Studies  [x]  Old records  []  Other:  Most pertinent findings include:      LAB RESULTS:      Lab 07/03/22  2249   WBC 8.74   HEMOGLOBIN 10.1*   HEMATOCRIT 33.1*   PLATELETS 192   NEUTROS ABS 7.25*   EOS ABS 0.00   MCV 82.3   CRP 20.21*   PROCALCITONIN 0.53*   LACTATE 1.0         Lab 07/03/22  2249   SODIUM 145   POTASSIUM 3.9   CHLORIDE 107   CO2 29.0   ANION GAP 9.0   BUN 41*   CREATININE 1.44*   EGFR 46.2*   GLUCOSE 115*    CALCIUM 9.0   MAGNESIUM 2.0   PHOSPHORUS 3.0         Lab 07/03/22  2249   TOTAL PROTEIN 6.0   ALBUMIN 2.50*   GLOBULIN 3.5   ALT (SGPT) 11   AST (SGOT) 14   BILIRUBIN 0.5   ALK PHOS 139*   LIPASE 21         Lab 07/03/22  2249   PROBNP 716.0   TROPONIN T 0.035*                 Lab 07/03/22  2307   PH, ARTERIAL 7.450   PCO2, ARTERIAL 45.4*   PO2 ART 81.9*   FIO2 32   HCO3 ART 31.5*   BASE EXCESS ART 6.7*   CARBOXYHEMOGLOBIN 0.6     Brief Urine Lab Results  (Last result in the past 365 days)      Color   Clarity   Blood   Leuk Est   Nitrite   Protein   CREAT   Urine HCG        05/29/22 1941 Yellow   Clear   Negative   Small (1+)   Positive   Negative               Microbiology Results (last 10 days)     ** No results found for the last 240 hours. **          XR Chest 1 View    Result Date: 7/3/2022  FRONTAL VIEW OF THE CHEST CLINICAL INDICATION: Dyspnea. COMPARISON: 5/21/2022. FINDINGS: No focal consolidation, pleural effusion or pneumothorax. Cardiomediastinal morphology is normal.     Impression: No acute cardiopulmonary abnormality. Electronically signed by:  Daniel Robledo M.D.  7/3/2022 9:43 PM Mountain Time          Assessment & Plan   Assessment & Plan       Acute on chronic respiratory failure (HCC)    BPH with urinary obstruction    Acute exacerbation of COPD with asthma (HCC)    Anxiety and depression    Anemia    Essential hypertension    Elevated troponin    OKSANA (acute kidney injury) (HCC)    89 y.o. male with PMH significant for BPH with urinary retention, COPD on 2 LNC baseline, anxiety/depression, SIADH, who presents to the ED with complaint of cough and chest pain who is found to have concern COPD exacerbation and PNA.    Sepsis: hypoxia, elevated CRP, elevated procal. Source:  Acute on Chronic Respiratory Failure with hypoxia   COPD exacerbation with concern for underlying PNA   -Vancomycin, Cefepime, flagyl given in ED, will continue Vancomycin, Cefepime, and Doxycycline   -Sputum cx   -BC x  2  -Scheduled and PRN duonebs   -Solumedrol 125mg given in ED, continue 60mg q 8 hour  -PNA urine antigens   -CBC, BMP in am   -MRSA PCR   -previously discharged home with hospice, now wants to escalate from comfort care, continues to be DNR/DNI    Elevated troponin   -Likely NSTEMI II secondary to hypoxic ischemic demand   -trend troponin  -trend EKG     OKSANA   -gentle IVF overnight   -BMP in am     BPH  Chronic Urinary retention   -Moody cath in place, replace and continue   -continue flomax     Hypertension   -continue hydralazine       Hypothyroid   -continue levothyroxine     DVT prophylaxis:  Heparin     CODE STATUS:    Medical Intervention Limits: NO intubation (DNI)  Level Of Support Discussed With: Patient  Code Status (Patient has no pulse and is not breathing): No CPR (Do Not Attempt to Resuscitate)  Medical Interventions (Patient has pulse or is breathing): Limited Support      This note has been completed as part of a split-shared workflow.     Signature:Electronically signed by DANNY Leggett, 07/04/22, 2:47 AM EDT.        Attending   Admission Attestation       I have seen and examined the patient, performing an independent face-to-face diagnostic evaluation with plan of care reviewed and developed with the advanced practice clinician (APC).      Brief Summary Statement:   Cooper Covarrubias is a 89 y.o. male with past medical history of SIADH, anxiety/depression, BPH with urinary retention status post Moody placement, COPD on chronic 2 L nasal cannula presents to the ER with complaints of cough chest pain.    Patient was recently placed under hospice services in June after multiple admissions with his most recent being related to sepsis from Klebsiella UTI.  Patient returns tonight with several day history of cough with gray sputum production and increasing shortness of breath.  Over the past 24 hours, patient has had intermittent chest pains with coughing.  He denies any fever or chills.  He was  noted to have increasing oxygen requirements from baseline 2 L up to 3 to 5 L while in the ER.    Currently, patient feels like his shortness of breath is improved and currently 5 out of 10 in severity.  At its worst was 8 out of 10 in severity.  Work-up concerning for recurrent sepsis given bandemia of 15%.  Patient started on broad-spectrum antibiotics, Solu-Medrol, and duo nebs    Remainder of detailed HPI is as noted by APC and has been reviewed and/or edited by me for completeness.    Attending Physical Exam:  Constitutional: Awake, alert, appears chronically ill  Eyes: PERRLA, sclerae anicteric, no conjunctival injection  HENT: NCAT, mucous membranes moist  Neck: Supple, no thyromegaly, no lymphadenopathy, trachea midline  Respiratory: rhonchi and wheezing bilaterally, mild to mod labored respirations   Cardiovascular: RRR, no murmurs, rubs, or gallops, palpable pedal pulses bilaterally  Gastrointestinal: Positive bowel sounds, soft, nontender, nondistended  Musculoskeletal: No bilateral ankle edema, no clubbing or cyanosis to extremities  Psychiatric: Appropriate affect, cooperative  Neurologic: Oriented x 3, strength symmetric in all extremities, Cranial Nerves grossly intact to confrontation, speech clear  Skin: No rashes      Brief Assessment/Plan :  See detailed assessment and plan developed with APC which I have reviewed and/or edited for completeness.        Admission Status: I believe that this patient meets INPATIENT status due to acute on chronic hypoxic resp failure, copd exacerbation.  I feel patient’s risk for adverse outcomes and need for care warrant INPATIENT evaluation and I predict the patient’s care encounter to likely last beyond 2 midnights.        Case Hernández,   07/04/22

## 2022-07-04 NOTE — PROGRESS NOTES
Continued Stay Note   [place]     Patient Name: Cooper Covarrubias  MRN: [id number]  Today's Date: 7/4/2022    Admit Date: 7/3/2022     Discharge Plan     Row Name 07/04/22 2725       Plan    Plan Comments Visit to bedside. Spoke with daughters regarding goals of care. Actively listened as they relay that at present goal for patient is to remain on current plan-limited support, treating all conditions with current medications.  They relay that the are looking at the possibility of [facility] or [facility].  Discussed physical therapy consult.  They relay have been thinking about this and would like to have this completed.  Rn offered support and open communication.   Updated staff Rn regarding visit and physical therapy consult discussion.  Should hospice be of further assistance please contact [phone].               Discharge Codes    No documentation.                     Jeannette Greene RN

## 2022-07-04 NOTE — PROGRESS NOTES
Pharmacy has adjusted the dose of Cefepime for Cooper Covarrubias for Est CrCl 30 - 59 mL/min    Estimated Creatinine Clearance: 34.3 mL/min (A) (by C-G formula based on SCr of 1.44 mg/dL (H)).  71.2 kg (157 lb); Serum Creatinine/BUN ratio = 28.5; creatinine = 1.12 mg/dL on 5/30/22    Original order: Cefepime 2 Gm IV Q8H (extended infusion protocol)  Adjusted order: Cefepime 2 Gm IV Q12H (extended infusion protocol)  Diagnosis: Pneumonia    Pharmacy will continue to monitor patient's renal function for further potential dose ajdustements.    Raul Grier, [facility]  7/4/2022  03:06 EDT

## 2022-07-04 NOTE — CONSULTS
"Palliative Care Initial Consult   Attending Physician: Hudson Sandoval DO  Referring Provider: MARGIE Campos    Reason for Referral:  assistance with clarification of goals of care    Code Status:   Code Status and Medical Interventions:   Ordered at: 07/04/22 0215     Medical Intervention Limits:    NO intubation (DNI)     Level Of Support Discussed With:    Patient     Code Status (Patient has no pulse and is not breathing):    No CPR (Do Not Attempt to Resuscitate)     Medical Interventions (Patient has pulse or is breathing):    Limited Support      Advanced Directives: Advance Directive Status: Patient has advance directive, copy in chart   Family/Support:  Helena Salomon (dtr), Jennifer Covarrubias (dtr), Mitra Coleman (dtr)  Goals of Care: TBD.    HPI: Cooper Covarrubias is a 89 y.o. male with PMH significant for asthma, anxiety, depression, BPH with urinary obstruction, COPD on 2LNC continuous, chronic anemia, SIADH, and HTN. Patient presented to [facility] ER on 7/3 for cough, chest pain, and increased oxygen needs. Work up revealed sepsis with possible source PNA, started on IV ABX. According to review of records patient elected hospice at time of discharge on 6/3/22. Palliative Care consulted for GOC in the context of complex medical decision making.   Patient oriented to self only, when asked questions often refers to his TV remote and states that he is trying, \"to get the system to work\". Patient denies SOA, nausea, and pain. Spoke with Helena (dtr) who reports they called hospice RN prior to admission and were told to come to [facility] ER. [facility] due to patient's increasing caregiving needs and are interested in LTC or [facility] hosp[facility] placement at discharge with hospice to continue to follow.     ROS: Denies pain, SOA, N/V/D, constipation, CP, HA. Unable to complete full ROS due to patient's AMS.     Past Medical History:   Diagnosis Date   • Anxiety    • Arthritis    • " "Basal cell carcinoma    • Cataract    • COPD (chronic obstructive pulmonary disease) (HCC)    • Hypertension    • Urinary incontinence    • Visual impairment      Past Surgical History:   Procedure Laterality Date   • CATARACT EXTRACTION     • SKIN CANCER EXCISION      1986, 1999, 21004, [id number]     Social History     Socioeconomic History   • Marital status:    Tobacco Use   • Smoking status: Former Smoker     Packs/day: 2.00     Years: 30.00     Pack years: 60.00   • Smokeless tobacco: Never Used   Substance and Sexual Activity   • Alcohol use: No   • Drug use: No   • Sexual activity: Defer     Comment:       Family History   Problem Relation Age of Onset   • No Known Problems Mother    • Cancer Father    • Cancer Brother        Allergies   Allergen Reactions   • Latex Hives       Current medication reviewed for route, type, dose and frequency and are current per MAR at time of dictation.    Palliative Performance Scale Score:  40%    /82 (BP Location: Left arm, Patient Position: Lying)   Pulse 90   Temp 97.4 °F (36.3 °C) (Oral)   Resp 19   Ht 182.9 cm (72\")   Wt 71.2 kg (157 lb)   SpO2 97%   BMI 21.29 kg/m²     Intake/Output Summary (Last 24 hours) at 7/4/2022 0853  Last data filed at 7/4/2022 0400  Gross per 24 hour   Intake --   Output 300 ml   Net -300 ml       Physical Exam:    General Appearance:    Patient sitting up in chair, awake, alert, cooperative, confused and frequent interjection requesting the \"system needs to be fixed\"   HEENT:    NC/AT, EOMI, anicteric, MMM, furrowed brow at times, NC in place   Neck:   supple, trachea midline, no JVD   Lungs:     CTA bilat, diminished in bases; respirations regular, even and unlabored; RR 18-22 on exam, 4LNC    Heart:    RRR, normal S1 and S2, no M/R/G, HR 84 on monitor   Abdomen:     Normal bowel sounds, soft, non-tender, non-distended   G/U:   FC draining clear yellow urine   MSK/Extremities:   Wasting, no edema   Pulses:   Pulses " palpable and equal bilaterally   Skin:   Warm, dry   Neurologic:   A/Ox1, cooperative, RIBEIRO, weakened BLE's   Psych:   Intermittent agitation, appropriate         Labs:   Results from last 7 days   Lab Units 07/04/22  0439   WBC 10*3/mm3 9.76   HEMOGLOBIN g/dL 9.7*   HEMATOCRIT % 32.4*   PLATELETS 10*3/mm3 178     Results from last 7 days   Lab Units 07/04/22  0439   SODIUM mmol/L 142   POTASSIUM mmol/L 4.0   CHLORIDE mmol/L 106   CO2 mmol/L 26.0   BUN mg/dL 41*   CREATININE mg/dL 1.42*   GLUCOSE mg/dL 126*   CALCIUM mg/dL 9.1     Results from last 7 days   Lab Units 07/04/22  0439   SODIUM mmol/L 142   POTASSIUM mmol/L 4.0   CHLORIDE mmol/L 106   CO2 mmol/L 26.0   BUN mg/dL 41*   CREATININE mg/dL 1.42*   CALCIUM mg/dL 9.1   BILIRUBIN mg/dL 0.4   ALK PHOS U/L 139*   ALT (SGPT) U/L 12   AST (SGOT) U/L 13   GLUCOSE mg/dL 126*     Imaging Results (Last 72 Hours)     Procedure Component Value Units Date/Time    XR Chest 1 View [[id number]] Collected: 07/03/22 2342     Updated: 07/03/22 2344    Narrative:      FRONTAL VIEW OF THE CHEST    CLINICAL INDICATION: Dyspnea.    COMPARISON: 5/21/2022.    FINDINGS: No focal consolidation, pleural effusion or pneumothorax. Cardiomediastinal morphology is normal.       Impression:      No acute cardiopulmonary abnormality.    Electronically signed by:  Daniel Robledo M.D.    7/3/2022 9:43 PM Mountain Time                Diagnostics: Reviewed    A:   Patient Active Problem List   Diagnosis   • Asthma   • Anxiety   • Urinary incontinence   • BPH with urinary obstruction   • Nocturia   • Arthritis   • Vitamin D deficiency   • Acute exacerbation of COPD with asthma ([facility])   • Conjunctivitis   • Anxiety and depression   • Declining functional status   • Anemia   • Essential hypertension   • Hyponatremia   • SIADH (syndrome of inappropriate ADH production) ([facility])   • Acute on chronic respiratory failure ([facility])   • Elevated troponin   • OKSANA (acute kidney injury) ([facility])   • Sepsis ([facility])     89  y.o. male with sepsis, COPD exacerbation, urinary retention.    S/S:   1. Dyspnea -currently on 4LNC  -continue scheduled Duonebs    2. Pain -presumed generalized MSK   -continue Tylenol 650mg PO q 4 hours prn  -continue Tramadol prn    3. Agitation -baseline dementia  -continue Seroquel 25mg PO nightly prn    4. GOC -DNR/DNI -per discussion with daughter and review LW/ACP  -current home hospice patient, and daughters would like patient to remain hospice patient  -hospice consult placed for them to follow along in the periphery   -daughters seeking [facility]TC/[facility] hosp[facility] unit placement due to increased needs of patient, defer to CM    P: Patient confused and unable to answer questions. Called and spoke with Helena (dtr) on phone who reports patient with increased caregiving needs at home that she and her sisters are unable to provide. They are interested in LTC placement and were beginning to look into this prior to hospitalization. With patient's onset of chest pain and cough, they called the hospice RN who indicated they should seek hospital care. Hospice notified and they will follow along the periphery. GOC remains hospice care upon discharge to LTC. All questions and concerns addressed.    Thank you for this consult and allowing us to participate in patient's plan of care. Palliative Care Team will continue to follow patient. Please do not hesitate to contact us regarding further symptom management or goals of care needs.  Time: 60 minutes spent reviewing medical and medication records, assessing and examining patient, discussing with family, answering questions, providing some guidance about a plan and documentation of care, and coordinating care with other healthcare members, with > 50% time spent face to face.         Selam Franklin, APRN  7/4/2022

## 2022-07-04 NOTE — PLAN OF CARE
Goal Outcome Evaluation:  Plan of Care Reviewed With: patient        Progress: no change   SLP evaluation completed. Will address swallowing. Please see note for further details and recommendations.

## 2022-07-04 NOTE — THERAPY EVALUATION
Patient Name: Cooper Covarrubias  : 1931    MRN: [id number]                              Today's Date: 2022       Admit Date: 7/3/2022    Visit Dx:     ICD-10-CM ICD-9-CM   1. Acute on chronic respiratory failure with hypoxia (HCC)  J96.21 518.84     799.02   2. OKSANA (acute kidney injury) (HCC)  N17.9 584.9   3. Elevated troponin  R77.8 790.6     Patient Active Problem List   Diagnosis   • Asthma   • Anxiety   • Urinary incontinence   • BPH with urinary obstruction   • Nocturia   • Arthritis   • Vitamin D deficiency   • Acute exacerbation of COPD with asthma (HCC)   • Conjunctivitis   • Anxiety and depression   • Declining functional status   • Anemia   • Essential hypertension   • Hyponatremia   • SIADH (syndrome of inappropriate ADH production) (HCC)   • Acute on chronic respiratory failure (HCC)   • Elevated troponin   • OKSANA (acute kidney injury) (HCC)   • Sepsis (HCC)     Past Medical History:   Diagnosis Date   • Anxiety    • Arthritis    • Basal cell carcinoma    • Cataract    • COPD (chronic obstructive pulmonary disease) (HCC)    • Hypertension    • Urinary incontinence    • Visual impairment      Past Surgical History:   Procedure Laterality Date   • CATARACT EXTRACTION     • SKIN CANCER EXCISION      , , , [id number]      General Information     Row Name 22 0951          OT Time and Intention    Document Type evaluation  -CS     Mode of Treatment occupational therapy  -CS     Row Name 22 0951          General Information    Patient Profile Reviewed yes  -CS     Prior Level of Function --  Pt limited historian this date. Per chart review (recent admission) - as of late May Pt was Ind at baseline for mobility w/ SPC and ADLs. Live w/ Dtr, not present for current info. Pt verbalizes he does not get out of bed often.  -CS     Existing Precautions/Restrictions fall;other (see comments)  wero Moody  -CS     Barriers to Rehab cognitive status;medically complex  -CS     Row  Name 07/04/22 0951          Living Environment    People in Home child(terrell), adult;other (see comments)  Dtr, per chart  -CS     Row Name 07/04/22 0951          Home Main Entrance    Number of Stairs, Main Entrance two  -CS     Row Name 07/04/22 0951          Stairs Within Home, Primary    Stairs, Within Home, Primary Per chart review Pt lives in one level home over basement, does not use stairs  -     Row Name 07/04/22 0951          Cognition    Orientation Status (Cognition) oriented to;person;place;disoriented to;situation;time  -CS     Row Name 07/04/22 0951          Safety Issues, Functional Mobility    Safety Issues Affecting Function (Mobility) friction/shear risk;insight into deficits/self-awareness;positioning of assistive device;safety precaution awareness;safety precautions follow-through/compliance;sequencing abilities  -     Impairments Affecting Function (Mobility) balance;cognition;endurance/activity tolerance;range of motion (ROM);strength  -     Cognitive Impairments, Mobility Safety/Performance insight into deficits/self-awareness;problem-solving/reasoning;safety precaution awareness;sequencing abilities;safety precaution follow-through  -           User Key  (r) = Recorded By, (t) = Taken By, (c) = Cosigned By    Initials Name Provider Type    CS Binu Gregg OT Occupational Therapist                 Mobility/ADL's     Row Name 07/04/22 0956          Bed Mobility    Comment, (Bed Mobility) Pt received EOB  -     Row Name 07/04/22 0956          Transfers    Transfers bed-chair transfer  -     Bed-Chair [place] (Transfers) maximum assist (25% patient effort);2 person assist;nonverbal cues (demo/gesture);verbal cues  -     Assistive Device (Bed-Chair Transfers) other (see comments)  -     Row Name 07/04/22 0956          Bed-Chair Transfer    Comment, (Bed-Chair Transfer) BUE support for squat-pivot to recliner, cues for anterior weight shifting  -     Row Name 07/04/22  0956          Activities of Daily Living    BADL Assessment/Intervention lower body dressing;grooming  -CS     Row Name 07/04/22 0956          Lower Body Dressing Assessment/Training    [place] Level (Lower Body Dressing) don;socks;dependent (less than 25% patient effort)  -     Position (Lower Body Dressing) edge of bed sitting  -CS     Row Name 07/04/22 0956          Grooming Assessment/Training    [place] Level (Grooming) grooming skills;hair care, combing/brushing;wash face, hands;minimum assist (75% patient effort)  -     Position (Grooming) supported sitting  -           User Key  (r) = Recorded By, (t) = Taken By, (c) = Cosigned By    Initials Name Provider Type     Binu Gregg OT Occupational Therapist               Obj/Interventions     Row Name 07/04/22 0957          Sensory Assessment (Somatosensory)    Sensory Assessment (Somatosensory) not tested  -[facility] Name 07/04/22 0957          Vision Assessment/Intervention    Visual Impairment/Limitations WFL;corrective lenses for reading  -     Row Name 07/04/22 0957          Range of Motion Comprehensive    General Range of Motion bilateral upper extremity ROM WFL  -[facility] Name 07/04/22 0957          Strength Comprehensive (MMT)    General Manual Muscle Testing (MMT) Assessment upper extremity strength deficits identified  -     Comment, General Manual Muscle Testing (MMT) Assessment B shoulder flex 4-/5, B elbow flex 4/5, B grasp 4-/5  -     Row Name 07/04/22 0957          Motor Skills    Motor Skills functional endurance  -     Functional Endurance fatigues easily, generalized deconditioning, supplemental O2 required for stable sats  -     Row Name 07/04/22 0957          Balance    Balance Assessment sitting static balance;sitting dynamic balance;standing static balance  -     Static Sitting Balance standby assist  -     Dynamic Sitting Balance contact guard  -CS     Position, Sitting Balance sitting edge of  bed;unsupported  -CS     Static Standing Balance moderate assist;verbal cues  -CS     Balance Interventions sitting;weight shifting activity;sit to stand  -CS     Comment, Balance progressed to SBA following tactile cues for B foot placement and anterior weight shifting at trunk, postural cues throughout  -CS           User Key  (r) = Recorded By, (t) = Taken By, (c) = Cosigned By    Initials Name Provider Type    CS Binu Gregg, OT Occupational Therapist               Goals/Plan     Row Name 07/04/22 1005          Bed Mobility Goal 1 (OT)    Activity/Assistive Device (Bed Mobility Goal 1, OT) sit to supine;supine to sit;scooting  -CS     [place] Level/Cues Needed (Bed Mobility Goal 1, OT) contact guard required  -CS     Time Frame (Bed Mobility Goal 1, OT) long term goal (LTG)  -CS     Progress/Outcomes (Bed Mobility Goal 1, OT) goal ongoing  -CS     Row Name 07/04/22 1005          Transfer Goal 1 (OT)    Activity/Assistive Device (Transfer Goal 1, OT) bed-to-chair/chair-to-bed;toilet;walker, rolling  -CS     [place] Level/Cues Needed (Transfer Goal 1, OT) minimum assist (75% or more patient effort)  -CS     Time Frame (Transfer Goal 1, OT) long term goal (LTG);10 days  -CS     Progress/Outcome (Transfer Goal 1, OT) goal ongoing  -CS     Row Name 07/04/22 1005          Dressing Goal 1 (OT)    Activity/Device (Dressing Goal 1, OT) upper body dressing  -CS     [place]/Cues Needed (Dressing Goal 1, OT) standby assist;verbal cues required;set-up required  -CS     Time Frame (Dressing Goal 1, OT) long term goal (LTG);10 days  -CS     Strategies/Barriers (Dressing Goal 1, OT) EOB sitting balance  -CS     Progress/Outcome (Dressing Goal 1, OT) goal ongoing  -CS     Row Name 07/04/22 1005          Grooming Goal 1 (OT)    Activity/Device (Grooming Goal 1, OT) grooming skills, all;hair care;wash face, hands  -CS     [place] (Grooming Goal 1, OT) set-up required  -CS     Time Frame (Grooming Goal 1,  OT) long term goal (LTG);10 days  -CS     Strategies/Barriers (Grooming Goal 1, OT) sitting EOB  -CS     Progress/Outcome (Grooming Goal 1, OT) goal ongoing  -[facility] Name 07/04/22 1005          Therapy Assessment/Plan (OT)    Planned Therapy Interventions (OT) activity tolerance training;functional balance retraining;ROM/therapeutic exercise;occupation/activity based interventions;strengthening exercise;transfer/mobility retraining;patient/caregiver education/training;neuromuscular control/coordination retraining;adaptive equipment training  -CS           User Key  (r) = Recorded By, (t) = Taken By, (c) = Cosigned By    Initials Name Provider Type    CS Binu Gregg, OT Occupational Therapist               Clinical Impression     [facility] Name 07/04/22 1000          Pain Assessment    Additional Documentation Pain Scale: FACES Pre/Post-Treatment (Group)  -CS     Row Name 07/04/22 1000          Pain Scale: FACES Pre/Post-Treatment    Pain: FACES Scale, Pretreatment 0-->no hurt  -CS     Posttreatment Pain Rating 0-->no hurt  -CS     Row Name 07/04/22 1000          Plan of Care Review    Plan of Care Reviewed With patient  -CS     Progress no change  OT eval  -CS     Outcome Evaluation OT eval completed. ADL completion limited by increased confusion, generalized weakness (BLE>BUE), and decreased functional endurance warranting skilled IP OT services to promote return to PLOF. MaxA w/2 for squat pivot to recliner, dependent for donning socks, Min-ModA for grooming tasks. Rec d/c to SNF rehab.  -     Row Name 07/04/22 1000          Therapy Assessment/Plan (OT)    Rehab Potential (OT) good, to achieve stated therapy goals  -CS     Criteria for Skilled Therapeutic Interventions Met (OT) yes;meets criteria  -CS     Therapy Frequency (OT) daily  -CS     Row Name 07/04/22 1000          Therapy Plan Review/Discharge Plan (OT)    Anticipated Discharge Disposition (OT) skilled nursing facility  -[facility] Name 07/04/22  1000          Vital Signs    Pre Systolic BP Rehab 145  RN cleared for eval  -CS     Pre Treatment Diastolic BP 82  -CS     Post Systolic BP Rehab 136  -CS     Post Treatment Diastolic BP 85  -CS     Posttreatment Heart Rate (beats/min) 90  -CS     O2 Delivery Pre Treatment nasal cannula  -CS     O2 Delivery Intra Treatment nasal cannula  -CS     Post SpO2 (%) 95  -CS     O2 Delivery Post Treatment nasal cannula  -CS     Pre Patient Position Sitting  -CS     Intra Patient Position Standing  -CS     Post Patient Position Sitting  -CS     Row Name 07/04/22 1000          Positioning and Restraints    Pre-Treatment Position in bed  -CS     Post Treatment Position chair  -CS     In Chair notified nsg;reclined;sitting;call light within reach;encouraged to call for assist;exit alarm on;RUE elevated;waffle cushion;legs elevated;heels elevated  -CS           User Key  (r) = Recorded By, (t) = Taken By, (c) = Cosigned By    Initials Name Provider Type    CS Binu Gregg, OT Occupational Therapist               Outcome Measures     Row Name 07/04/22 1038          How much help from another is currently needed...    Putting on and taking off regular lower body clothing? 2  -CS     Bathing (including washing, rinsing, and drying) 2  -CS     Toileting (which includes using toilet bed pan or urinal) 2  -CS     Putting on and taking off regular upper body clothing 2  -CS     Taking care of personal grooming (such as brushing teeth) 3  -CS     Eating meals 3  -CS     AM-PAC 6 Clicks Score (OT) 14  -CS     Row Name 07/04/22 0954 07/04/22 0400       How much help from another person do you currently need...    Turning from your back to your side while in flat bed without using bedrails? 2  -LM 3  -JL    Moving from lying on back to sitting on the side of a flat bed without bedrails? 2  -LM 2  -JL    Moving to and from a bed to a chair (including a wheelchair)? 2  -LM 2  -JL    Standing up from a chair using your arms (e.g.,  wheelchair, bedside chair)? 2  -LM 1  -JL    Climbing 3-5 steps with a railing? 1  -LM 1  -JL    To walk in hospital room? 1  -LM 1  -JL    AM-PAC 6 Clicks Score (PT) 10  -LM 10  -JL    Highest level of mobility 4 --> Transferred to chair/commode  -LM 4 --> Transferred to chair/commode  -JL    Row Name 07/04/22 1038 07/04/22 0954       Functional Assessment    Outcome Measure Options AM-PAC 6 Clicks Daily Activity (OT)  -CS AM-PAC 6 Clicks Basic Mobility (PT)  -          User Key  (r) = Recorded By, (t) = Taken By, (c) = Cosigned By    Initials Name Provider Type    LM Christin Aly, PT Physical Therapist    Binu Meeks, OT Occupational Therapist    Cyndi Hill, RN Registered Nurse                Occupational Therapy Education                 Title: PT OT SLP Therapies (In Progress)     Topic: Occupational Therapy (In Progress)     Point: ADL training (In Progress)     Description:   Instruct learner(s) on proper safety adaptation and remediation techniques during self care or transfers.   Instruct in proper use of assistive devices.              Learning Progress Summary           Patient Acceptance, E,D, NR by  at 7/4/2022 1039                   Point: Home exercise program (Not Started)     Description:   Instruct learner(s) on appropriate technique for monitoring, assisting and/or progressing therapeutic exercises/activities.              Learner Progress:  Not documented in this visit.          Point: Precautions (In Progress)     Description:   Instruct learner(s) on prescribed precautions during self-care and functional transfers.              Learning Progress Summary           Patient Acceptance, E,D, NR by  at 7/4/2022 1039                   Point: Body mechanics (In Progress)     Description:   Instruct learner(s) on proper positioning and spine alignment during self-care, functional mobility activities and/or exercises.              Learning Progress Summary           Patient  Acceptance, E,D, NR by  at 7/4/2022 1039                               User Key     Initials Effective Dates Name Provider Type Discipline     06/16/21 -  Binu Gregg OT Occupational Therapist OT              OT Recommendation and Plan  Planned Therapy Interventions (OT): activity tolerance training, functional balance retraining, ROM/therapeutic exercise, occupation/activity based interventions, strengthening exercise, transfer/mobility retraining, patient/caregiver education/training, neuromuscular control/coordination retraining, adaptive equipment training  Therapy Frequency (OT): daily  Plan of Care Review  Plan of Care Reviewed With: patient  Progress: no change (OT eval)  Outcome Evaluation: OT eval completed. ADL completion limited by increased confusion, generalized weakness (BLE>BUE), and decreased functional endurance warranting skilled IP OT services to promote return to PLOF. MaxA w/2 for squat pivot to recliner, dependent for donning socks, Min-ModA for grooming tasks. Rec d/c to SNF rehab.     Time Calculation:    Time Calculation- OT     Row Name 07/04/22 1039             Time Calculation- OT    OT Start Time 0846  -CS      OT Received On 07/04/22  -CS      OT Goal Re-Cert Due Date 07/14/22  -              Timed Charges    82803 - OT E-Stim Attended Minutes --  -CS              Untimed Charges    OT Eval/Re-eval Minutes 48  -CS              Total Minutes    Timed Charges Total Minutes --  -CS      Untimed Charges Total Minutes 48  -CS       Total Minutes 48  -CS            User Key  (r) = Recorded By, (t) = Taken By, (c) = Cosigned By    Initials Name Provider Type     Binu Gregg OT Occupational Therapist              Therapy Charges for Today     Code Description Service Date Service Provider Modifiers Qty    [id number]  OT EVAL MOD COMPLEXITY 4 7/4/2022 Binu Gregg OT GO 1               Binu Gregg OT  7/4/2022

## 2022-07-05 PROBLEM — E43 SEVERE MALNUTRITION (HCC): Status: ACTIVE | Noted: 2022-01-01

## 2022-07-05 NOTE — NURSING NOTE
Low Air Loss specialty bed ordered.  I spoke with nurse who states that bottom is still blanchable redness.  I will place orders to turn every 2 and apply a moisture barrier cream and elevate heels.  Please reconsult if further needs arise.

## 2022-07-05 NOTE — PROGRESS NOTES
[facility] Medicine Services  PROGRESS NOTE    Patient Name: Cooper Covarrubias  : 1931  MRN: [id number]    Date of Admission: 7/3/2022  Primary Care Physician: Lb Nielsen MD    Subjective   Subjective     CC:  hypoxia    HPI:  Mr. Covarrubias is a pleasant 89-year-old male.  Since yesterday he has been seen by numerous services including speech who have made dietary recommendations for the patient.  He is also been seen by spiritual care.  Per hospice note the [facility] per wound care patient has a blanchable wound recommend frequent turning.  Also reviewed palliative care's note.  I reviewed his labs which shows a improved creatinine as compared to yesterday.  Also reviewed his CBC which is stable.  Per reports he is more confused today.    ROS:  Unable to assess as patient is confused denies any pain except for some mild left abdominal pain denies shortness of breath is tolerating some p.o. per family.  Is having urinary output through catheter.        Objective   Objective     Vital Signs:   Temp:  [97.5 °F (36.4 °C)-98.1 °F (36.7 °C)] 97.5 °F (36.4 °C)  Heart Rate:  [] 100  Resp:  [18-20] 18  BP: (105-169)/(68-98) 105/68  Flow (L/min):  [1-3] 2     Physical Exam:  Constitutional: No acute distress, awake, alert  Respiratory: Faint bibasilar crackles and some expiratory wheeze, respiratory effort normal   Cardiovascular: Irregular, no murmurs, rubs, or gallops  Gastrointestinal: Positive bowel sounds, soft, nontender, nondistended  Musculoskeletal: +1 pedal  ankle edema  Psychiatric: Appropriate affect, cooperative  Neurologic:, strength symmetric in all extremities,speech clear        Results Reviewed:  LAB RESULTS:      Lab 22  0635 22  0439 22  2249   WBC 8.69 9.76 8.74   HEMOGLOBIN 10.0* 9.7* 10.1*   HEMATOCRIT 31.3* 32.4* 33.1*   PLATELETS 206 178 192   NEUTROS ABS  --   --  7.25*   EOS ABS  --   --  0.00   MCV  78.4* 83.1 82.3   CRP  --   --  20.21*   PROCALCITONIN  --   --  0.53*   LACTATE  --   --  1.0         Lab 07/05/22  0636 07/04/22 0439 07/03/22  2249   SODIUM 143 142 145   POTASSIUM 4.2 4.0 3.9   CHLORIDE 108* 106 107   CO2 26.0 26.0 29.0   ANION GAP 9.0 10.0 9.0   BUN 42* 41* 41*   CREATININE 1.19 1.42* 1.44*   EGFR 58.0* 46.9* 46.2*   GLUCOSE 157* 126* 115*   CALCIUM 9.2 9.1 9.0   MAGNESIUM  --   --  2.0   PHOSPHORUS  --   --  3.0         Lab 07/04/22 0439 07/03/22 2249   TOTAL PROTEIN 6.0 6.0   ALBUMIN 2.30* 2.50*   GLOBULIN 3.7 3.5   ALT (SGPT) 12 11   AST (SGOT) 13 14   BILIRUBIN 0.4 0.5   ALK PHOS 139* 139*   LIPASE  --  21         Lab 07/04/22  0439 07/04/22 0215 07/03/22 2249   PROBNP  --   --  716.0   TROPONIN T 0.028 0.021 0.035*                 Lab 07/03/22  2307   PH, ARTERIAL 7.450   PCO2, ARTERIAL 45.4*   PO2 ART 81.9*   FIO2 32   HCO3 ART 31.5*   BASE EXCESS ART 6.7*   CARBOXYHEMOGLOBIN 0.6     Brief Urine Lab Results  (Last result in the past 365 days)      Color   Clarity   Blood   Leuk Est   Nitrite   Protein   CREAT   Urine HCG        07/04/22 0557 Yellow   Turbid   Large (3+)   Moderate (2+)   Negative   100 mg/dL (2+)                 Microbiology Results Abnormal     Procedure Component Value - Date/Time    Urine Culture - Urine, Urine, Clean Catch [[id number]]  (Normal) Collected: 07/04/22 0557    Lab Status: Final result Specimen: Urine, Clean Catch Updated: 07/05/22 0944     Urine Culture No growth    Blood Culture - Blood, Arm, Right [[id number]]  (Normal) Collected: 07/04/22 0045    Lab Status: Preliminary result Specimen: Blood from Arm, Right Updated: 07/05/22 0247     Blood Culture No growth at 24 hours    Blood Culture - Blood, Arm, Left [[id number]]  (Normal) Collected: 07/04/22 0050    Lab Status: Preliminary result Specimen: Blood from Arm, Left Updated: 07/05/22 0247     Blood Culture No growth at 24 hours    Legionella Antigen, Urine - Urine, Urine, Clean Catch [[id number]]   (Normal) Collected: 07/04/22 0557    Lab Status: Final result Specimen: Urine, Clean Catch Updated: 07/04/22 1255     LEGIONELLA ANTIGEN, URINE Negative    MRSA Screen, PCR (Inpatient) - Swab, Nares [[id number]]  (Normal) Collected: 07/04/22 0445    Lab Status: Final result Specimen: Swab from Nares Updated: 07/04/22 1108     MRSA PCR Negative    Narrative:      The negative predictive value of this diagnostic test is high and should only be used to consider de-escalating anti-MRSA therapy. A positive result may indicate colonization with MRSA and must be correlated clinically.  MRSA Negative    COVID PRE-OP / PRE-PROCEDURE SCREENING ORDER (NO ISOLATION) - Swab, Nasopharynx [[id number]]  (Normal) Collected: 07/04/22 0445    Lab Status: Final result Specimen: Swab from Nasopharynx Updated: 07/04/22 0550    Narrative:      The following orders were created for panel order COVID PRE-OP / PRE-PROCEDURE SCREENING ORDER (NO ISOLATION) - Swab, Nasopharynx.  Procedure                               Abnormality         Status                     ---------                               -----------         ------                     Respiratory Panel PCR w/...[[id number]]  Normal              Final result                 Please view results for these tests on the individual orders.    Respiratory Panel PCR w/COVID-19(SARS-CoV-2) TERESA/EMI/RUSTY/PAD/COR/MAD/KATE In-House, NP Swab in UTM/VTM, 3-4 HR TAT - Swab, Nasopharynx [[id number]]  (Normal) Collected: 07/04/22 0445    Lab Status: Final result Specimen: Swab from Nasopharynx Updated: 07/04/22 0550     ADENOVIRUS, PCR Not Detected     Coronavirus 229E Not Detected     Coronavirus HKU1 Not Detected     Coronavirus NL63 Not Detected     Coronavirus OC43 Not Detected     COVID19 Not Detected     Human Metapneumovirus Not Detected     Human Rhinovirus/Enterovirus Not Detected     Influenza A PCR Not Detected     Influenza B PCR Not Detected     Parainfluenza Virus 1 Not Detected      Parainfluenza Virus 2 Not Detected     Parainfluenza Virus 3 Not Detected     Parainfluenza Virus 4 Not Detected     RSV, PCR Not Detected     Bordetella pertussis pcr Not Detected     Bordetella parapertussis PCR Not Detected     Chlamydophila pneumoniae PCR Not Detected     Mycoplasma pneumo by PCR Not Detected    Narrative:      In the setting of a positive respiratory panel with a viral infection PLUS a negative procalcitonin without other underlying concern for bacterial infection, consider observing off antibiotics or discontinuation of antibiotics and continue supportive care. If the respiratory panel is positive for atypical bacterial infection (Bordetella pertussis, Chlamydophila pneumoniae, or Mycoplasma pneumoniae), consider antibiotic de-escalation to target atypical bacterial infection.          XR Chest 1 View    Result Date: 7/3/2022  FRONTAL VIEW OF THE CHEST CLINICAL INDICATION: Dyspnea. COMPARISON: 5/21/2022. FINDINGS: No focal consolidation, pleural effusion or pneumothorax. Cardiomediastinal morphology is normal.     Impression: No acute cardiopulmonary abnormality. Electronically signed by:  Daniel Robledo M.D.  7/3/2022 9:43 PM Mountain Time          I have reviewed the medications:  Scheduled Meds:aspirin, 324 mg, Oral, Once  cefepime, 2 g, Intravenous, Q12H  doxycycline, 100 mg, Intravenous, Q12H  heparin (porcine), 5,000 Units, Subcutaneous, Q12H  hydrALAZINE, 25 mg, Oral, Q12H  ipratropium-albuterol, 3 mL, Nebulization, Q4H - RT  levothyroxine, 137 mcg, Oral, Q AM  methylPREDNISolone sodium succinate, 60 mg, Intravenous, Q8H  pharmacy consult - MTM, , Does not apply, Daily  sodium chloride, 10 mL, Intravenous, Q12H  tamsulosin, 0.4 mg, Oral, Daily      Continuous Infusions:   PRN Meds:.•  acetaminophen  •  ipratropium-albuterol  •  melatonin  •  ondansetron  •  QUEtiapine  •  sodium chloride  •  sodium chloride  •  traMADol    Assessment & Plan   Assessment & Plan     Active Hospital  Problems    Diagnosis  POA   • **Acute on chronic respiratory failure ([facility]) [J96.20]  Yes   • Elevated troponin [R77.8]  Yes   • OKSANA (acute kidney injury) ([facility]) [N17.9]  Yes   • Sepsis (HCC) [A41.9]  Yes   • COPD exacerbation ([facility]) [J44.1]  Yes   • Anemia [D64.9]  Yes   • Essential hypertension [I10]  Yes   • Anxiety and depression [F41.9, F32.A]  Yes   • BPH with urinary obstruction [N40.1, N13.8]  Yes   • Acute exacerbation of COPD with asthma ([facility]) [J44.1, J45.901]  Yes      Resolved Hospital Problems   No resolved problems to display.        Brief Hospital Course to date:  Cooper Covarrubias is a 89 y.o. male  to summarize Mr. Leyva was on hospice in June. has acute on chronic respiratory failure AECOPD (solumedrol, nebs), ?PNA receiving broad spectrum antibiotics. He also has 15% bands and hypoxia concerning for sepsis. Concern for NSTEMI due to demand ischemia, very mild troponin rise. Concern for mild OKSANA Cr 1.42 from 1.44 baseline around 0.95 to 1.2. Was receiving LR at 50 now dc'ed. Concern with vancomycin with kidneys. MRSA swab pending. If negative will DC vancomycin. He is DNI. He has a palliative care consult. O2 sat this am is 97% on 4 liters, vitals are stable and he is afebrile. CXR is read as clear. UA shows too numerous to count WBC and RBC. He does have a chronic chandler which we will make sure was replaced. He also recently had klebsiella UTI per records. I discussed with nurse, she confirmed the chandler was changed. Mr. Covarrubias is confused for me at the time of my exam perseverating about the warranty on his air conditioner and wanting to get treatment started. I'm not certain what his baseline is. I told nursing to let me know when family is here and we can talk with them as well. He's stable right now. Will continue treatments from admission and await further goal clarification from palliative.    Acute on Chronic Respiratory failure:  -on broad spectrum antibiotics for possible PNA  -on steroids  and Nebs for COPD  -currently 94% on 2 liters    UTI  -UCx no growth as yet. On Cefepime. Recent Klebsiella.    Elevated Troponin  -Minimal elevation thought secondary to acute medical conditions    OKSANA  -Creatinine improved to near baseline today 1.4 down to 1.19.    Sepsis  -heart rate has increased a bit but I am hesitant to give fluids with his edema.    COPD Exacerbation  -on Steroids and nebs.    Anemia  -hemoglobin stable is microcytic iron panel from recent admission shows iron deficiency anemia  -Not currently on iron will consider adding    Essential HTN  -Hydralazine  -currently normotensive      BPH with Urinary obstruction  -Chronic chandler and Flomax  -Chandler was replaced    [facility]  Expected Discharge Date: 7/9/2022    DVT prophylaxis:  Medical DVT prophylaxis orders are present.     AM-PAC 6 Clicks Score (PT): 12 (07/04/22 2000)    CODE STATUS:   Code Status and Medical Interventions:   Ordered at: 07/04/22 0215     Medical Intervention Limits:    NO intubation (DNI)     Level Of Support Discussed With:    Patient     Code Status (Patient has no pulse and is not breathing):    No CPR (Do Not Attempt to Resuscitate)     Medical Interventions (Patient has pulse or is breathing):    Limited Support       Hudson Sandoval DO  07/05/22

## 2022-07-05 NOTE — PLAN OF CARE
Goal Outcome Evaluation:  Plan of Care Reviewed With: patient           Outcome Evaluation: New palliative consult for assistance with GOC per REMEDIOS Montague PA-C.  Dr. Erickson saw pt. and discussed GOC with family.  Family indicated they are unable to take care of pt. at home in his current state so placement will be the goal.  Pt. very confused.  Pt. did not demonstrate any visible signs of discomfort during visit.  Palliative care to continue to follow for support, POC and ongoing GOC.      1330 Palliative IDT meeting: ALONZO Rodriguez RN, PN; KESHIA Voss RN, CHPN; EYAD Franklin, APRN; COLE Erickson, ; DAVID Blakc, [facility], [facility]; SVITLANA Milan      Problem: Adult Inpatient Plan of Care  Goal: Plan of Care Review  Flowsheets (Taken 7/5/2022 1455)  Plan of Care Reviewed With: patient  Outcome Evaluation: New palliative consult for assistance with GOC per REMEDIOS Montague PA-C.  Dr. Erickson saw pt. and discussed GOC with family.  Family indicated they are unable to take care of pt. at home in his current state so placement will be the goal.  Pt. very confused.  Pt. did not demonstrate any visible signs of discomfort during visit.  Palliative care to continue to follow for support, POC and ongoing GOC.     Problem: Adjustment to Illness COPD (Chronic Obstructive Pulmonary Disease)  Goal: Optimal Chronic Illness Coping  Intervention: Support and Optimize Psychosocial Response  Recent Flowsheet Documentation  Taken 7/5/2022 1455 by Pita Voss RN  Life Transition/Adjustment:   palliative care discussed   palliative care initiated     Problem: Palliative Care  Goal: Enhanced Quality of Life  Intervention: Promote Advance Care Planning  Flowsheets (Taken 7/5/2022 1455)  Life Transition/Adjustment:   palliative care discussed   palliative care initiated

## 2022-07-05 NOTE — PLAN OF CARE
Goal Outcome Evaluation:  Plan of Care Reviewed With: patient, daughter            SLP re-evaluation completed. Will sign-off . Please see note for further details and recommendations.

## 2022-07-05 NOTE — PROGRESS NOTES
Continued Stay Note   [place]     Patient Name: Cooper Covarrubias  MRN: [id number]  Today's Date: 7/5/2022    Admit Date: 7/3/2022     Discharge Plan     Row Name 07/05/22 1057       Plan    Plan     Plan Comments Visited with patient who is taking bites of breakfast.  No family at bedside this morning.  Patient denies discomfort.  Hospice will continue to follow.               Discharge Codes    No documentation.               Expected Discharge Date and Time     Expected Discharge Date Expected Discharge Time    Jul 9, 2022             Janet Harper RN

## 2022-07-05 NOTE — THERAPY RE-EVALUATION
Acute Care - Speech Language Pathology   Swallow Re-Evaluation [facility]   Clinical Swallow Re-Evaluation     Patient Name: Cooper Covarrubias  : 1931  MRN: [id number]  Today's Date: 2022               Admit Date: 7/3/2022    Visit Dx:     ICD-10-CM ICD-9-CM   1. Acute on chronic respiratory failure with hypoxia ([facility])  J96.21 518.84     799.02   2. OKSANA (acute kidney injury) ([facility])  N17.9 584.9   3. Elevated troponin  R77.8 790.6   4. Dysphagia, unspecified type  R13.10 787.20     Patient Active Problem List   Diagnosis   • Asthma   • Anxiety   • Urinary incontinence   • BPH with urinary obstruction   • Nocturia   • Arthritis   • Vitamin D deficiency   • Acute exacerbation of COPD with asthma ([facility])   • Conjunctivitis   • Anxiety and depression   • Declining functional status   • Anemia   • Essential hypertension   • Hyponatremia   • SIADH (syndrome of inappropriate ADH production) ([facility])   • Acute on chronic respiratory failure ([facility])   • Elevated troponin   • OKSANA (acute kidney injury) ([facility])   • Sepsis ([facility])   • COPD exacerbation ([facility])     Past Medical History:   Diagnosis Date   • Anxiety    • Arthritis    • Basal cell carcinoma    • Cataract    • COPD (chronic obstructive pulmonary disease) ([facility])    • Hypertension    • Urinary incontinence    • Visual impairment      Past Surgical History:   Procedure Laterality Date   • CATARACT EXTRACTION     • SKIN CANCER EXCISION      , , , [id number]       SLP Recommendation and Plan  SLP Swallowing Diagnosis: other (see comments) (no suspected pharyngeal impairment) (22 114)  SLP Diet Recommendation: soft textures, ground, thin liquids, other (see comments) (extra sauces/gravies) (22 1145)  Recommended Precautions and Strategies: upright posture during/after eating, small bites of food and sips of liquid, general aspiration precautions (22 1145)  SLP Rec. for Method of Medication Administration: meds crushed, with pudding or applesauce,  as tolerated (07/05/22 1145)     Monitor for Signs of Aspiration: yes, notify SLP if any concerns (07/05/22 1145)     Swallow Criteria for Skilled Therapeutic Interventions Met: baseline status, no significant expected improvement in functional status, no problems identified which require skilled intervention (07/05/22 1145)  Anticipated Discharge Disposition (SLP): unknown (07/05/22 1145)     Therapy Frequency (Swallow): evaluation only (07/05/22 1145)                                     Plan of Care Reviewed With: patient, daughter      SWALLOW EVALUATION (last 72 hours)     SLP Adult Swallow Evaluation     Row Name 07/05/22 1145 07/04/22 1215                Rehab Evaluation    Document Type re-evaluation  -MP evaluation  -AC       Subjective Information no complaints  -MP no complaints  -AC       Patient Observations alert;cooperative  -MP alert;cooperative  -AC       Patient/Family/Caregiver Comments/Observations Daughters present  -MP No family present.  -AC       Patient Effort good  -MP good  -AC                General Information    Patient Profile Reviewed yes  -MP yes  -AC       Pertinent History Of Current Problem See previous eval.  -MP Re-adm w/ a/c respiratory failure. Hx COPD, asthma, dementia.  -AC       Current Method of Nutrition pureed with some mashed;thin liquids  -MP NPO  -AC       Precautions/Limitations, Vision WFL;for purposes of eval  -MP WFL;for purposes of eval  -AC       Precautions/Limitations, Hearing hearing impairment, bilaterally;other (see comments)  hearing aid in R ear  -MP hearing impairment, bilaterally;other (see comments)  provided sound amplification to R ear--appeared to help  -AC       Prior Level of Function-Communication cognitive-linguistic impairment  -MP cognitive-linguistic impairment  -AC       Prior Level of Function-Swallowing no diet consistency restrictions  -MP no diet consistency restrictions  -AC       Plans/Goals Discussed with patient;family;agreed upon   -MP patient;agreed upon  -AC       Barriers to Rehab medically complex;cognitive status  -MP medically complex;cognitive status  -AC       Patient's Goals for Discharge patient did not state  -MP patient did not state  -AC       Family Goals for Discharge family did not state  -MP --                Pain    Additional Documentation Pain Scale: FACES Pre/Post-Treatment (Group)  -MP --                Pain Scale: FACES Pre/Post-Treatment    Pain: FACES Scale, Pretreatment 0-->no hurt  -MP 0-->no hurt  -AC       Posttreatment Pain Rating 0-->no hurt  -MP 0-->no hurt  -AC                Oral Motor Structure and Function    Dentition Assessment upper dentures/partial in place;lower dentures/partial in place;other (see comments)  dtrs brought dentures, places before eval  -MP edentulous, dentures not available  -AC       Secretion Management WNL/WFL  -MP WNL/WFL  -AC       Mucosal Quality moist, healthy  -MP dry  -AC                Oral Musculature and Cranial Nerve Assessment    Oral Motor General Assessment generalized oral motor weakness  -MP generalized oral motor weakness  -AC                General Eating/Swallowing Observations    Respiratory Support Currently in Use nasal cannula  -MP nasal cannula;other (see comments)  4L  -AC       Eating/Swallowing Skills self-fed;fed by SLP  -MP fed by SLP;self-fed;other (see comments)  needed assist  -AC       Positioning During Eating upright in bed  - upright 90 degree;upright in chair  -       Utensils Used spoon;cup;straw  -MP spoon;cup;straw  -AC       Consistencies Trialed thin liquids;pureed;soft textures  - thin liquids;nectar/syrup-thick liquids;pureed;soft textures  -AC                Clinical Swallow Eval    Oral Prep Phase impaired  -MP impaired  -AC       Oral Transit -- WFL  -AC       Oral Residue -- WFL  -AC       Pharyngeal Phase no overt signs/symptoms of pharyngeal impairment  -MP --       Clinical Swallow Evaluation Summary Mildly prolonged, though  adequate, mastication of soft solid. No glaring s/sxs aspiration/ pharyngeal impairment. Dtr requested upgrade from pureed/some mashed, as pt w/ aversion to pureed foods. Will upgrade to soft/ground, extra sauces/gravies, thin liquids. No further SLP/skilled intervention indicated. Will sign off. Please re-consult if any furthed needs.  -MP Increased oral prep time 2' missing dentures. Audible swallows w/ all consistencies, but otherwise no overt clinical s/sxs aspiration. Pt indicated occasional difficulty w/ solids/liquids.  -AC                Oral Prep Concerns    Oral Prep Concerns prolonged mastication  -MP increased prep time  -       Prolonged Mastication mechanical soft  -MP --       Increased Prep Time -- mechanical soft  -AC                Swallowing Quality of Life Assessment    Aversion noted with  -- nectar  -                SLP Evaluation Clinical Impression    SLP Swallowing Diagnosis other (see comments)  no suspected pharyngeal impairment  - R/O pharyngeal dysphagia  -       Functional Impact -- risk of aspiration/pneumonia  -       Rehab Potential/Prognosis, Swallowing -- re-evaluate goals as necessary  -       Swallow Criteria for Skilled Therapeutic Interventions Met baseline status;no significant expected improvement in functional status;no problems identified which require skilled intervention  - demonstrates skilled criteria  -                Recommendations    Therapy Frequency (Swallow) evaluation only  - --       SLP Diet Recommendation soft textures;ground;thin liquids;other (see comments)  extra sauces/gravies  -MP puree with some mashed;thin liquids  -       Recommended Diagnostics -- reassess via clinical swallow evaluation;other (see comments)  consider instrumental swallow study, pending further [facility] decisions  -       Recommended Precautions and Strategies upright posture during/after eating;small bites of food and sips of liquid;general aspiration precautions   -MP upright posture during/after eating;general aspiration precautions;assist with feeding  -AC       Oral Care Recommendations Oral Care BID/PRN  -MP Oral Care BID/PRN  -AC       SLP Rec. for Method of Medication Administration meds crushed;with pudding or applesauce;as tolerated  -MP meds crushed;with pudding or applesauce;as tolerated  -AC       Monitor for Signs of Aspiration yes;notify SLP if any concerns  -MP yes;notify SLP if any concerns  -AC       Anticipated Discharge Disposition (SLP) unknown  -MP anticipate therapy at next level of care  -             User Key  (r) = Recorded By, (t) = Taken By, (c) = Cosigned By    Initials Name Effective Dates    AC Padmini Turcios MS CCC-SLP 06/16/21 -     MP Michael Crystal MS CCC-SLP 12/28/21 -                 EDUCATION  The patient has been educated in the following areas:   Dysphagia (Swallowing Impairment).              Time Calculation:    Time Calculation- SLP     Row Name 07/05/22 1213             Time Calculation- SLP    SLP Start Time 1245  -MP      SLP Received On 07/05/22  -MP              Untimed Charges    [id number] Eval Oral Pharyng Swallow Minutes 40  -MP              Total Minutes    Untimed Charges Total Minutes 40  -MP       Total Minutes 40  -MP            User Key  (r) = Recorded By, (t) = Taken By, (c) = Cosigned By    Initials Name Provider Type    MP Michael Crystal MS CCC-SLP Speech and Language Pathologist                Therapy Charges for Today     Code Description Service Date Service Provider Modifiers Qty    [id number] HC ST EVAL ORAL PHARYNG SWALLOW 3 7/5/2022 Michael Crystal MS CCC-SLP GN 1               MS SABRINA Guerrero  7/5/2022

## 2022-07-05 NOTE — CASE MANAGEMENT/SOCIAL WORK
Discharge Planning Assessment  [facility]     Patient Name: Cooper Covarrubias  MRN: [id number]  Today's Date: 7/5/2022    Admit Date: 7/3/2022     Discharge Needs Assessment     Row Name 07/05/22 1253       Living Environment    People in Home child(terrell), adult    Name(s) of People in Home Bettina, Candygreg, and Jennifer    Unique Family Situation Pt lives in his own home and 3 daughters take turns staying with pt around the clock    Current Living Arrangements home    Primary Care Provided by child(terrell)    Provides Primary Care For no one, unable/limited ability to care for self    Family Caregiver if Needed child(terrell), adult    Family Caregiver Names Jennifer, Helena, and Mitra(daughters)    Quality of Family Relationships helpful;involved;supportive    Able to Return to Prior Arrangements --  TBD    Living Arrangement Comments I spoke with daughters Bettina and Mitra outside pt's room with permission regarding discharge plan. Pt resides in [facility] in his own home and was recently discharged home from the hospital with [facility]. His 3 adult daughters take turns staying with pt around the clock.       Transition Planning    Patient/Family Anticipates Transition to other (see comments)  TBD    Patient/Family Anticipated Services at Transition hospice care       Discharge Needs Assessment    Readmission Within the Last 30 Days current reason for admission unrelated to previous admission    Current Outpatient/Agency/Support Group home hospice  PT is current with [facility]    Equipment Currently Used at Home nebulizer;hospital bed;oxygen;respiratory supplies  Daughter's unable to remember the name of O2 company    Concerns to be Addressed discharge planning    Anticipated Changes Related to Illness inability to care for self    Equipment Needed After Discharge respiratory supplies;oxygen;nebulizer    Outpatient/Agency/Support Group Needs other (see comments)  Pending family's decision, home  with hospice vs SNF with hospice    Discharge Coordination/Progress Pt has [employer] Medicare Replacement with prescription coverage. Pt uses [facility] on [address]  Pt was recently discharged home from the hospital with [facility] and daughters(all 3) were taking turns staying with pt around the clock. Ptis dependent of all ADLs and  has a history of COPD. Pt was brought back to the hospital for acute on chronic respiratory failure and currently  uses 2-3 L of O2 at home around the clock.  Daughters states that their father has some VA benefits as he was in the [place] War and they wanted their father to go to a facility with hospice, hopefully using his benefits.   I asked Ty with hospice to speak with family per request. Discharge plan TBD. CM will cont to follow.               Discharge Plan     Row Name 07/05/22 4872       Plan    Plan discharge plan    Plan Comments Discharge plan TBD.  Daughters are interested in patient going to a facility with hospice, possibly using VA benefits. Ty with hospice plans to speak with pt's family. CM will cont to follow.    Final Discharge Disposition Code 30 - still a patient    Row Name 07/05/22 7944       Plan    Plan d    Plan Comments d              Continued Care and Services - Admitted Since 7/3/2022    Coordination has not been started for this encounter.     Selected Continued Care - Prior Encounters Includes selections from prior encounters from 4/4/2022 to 7/5/2022    Discharged on 6/3/2022 Admission date: 5/29/2022 - Discharge disposition: Hospice/Home    Home Medical Care     Service Provider Selected Services Address Phone Fax Patient Preferred    [facility] [employer] [facility] [address] [facility] [address] [phone] --                Discharged on 5/28/2022 Admission date: 5/21/2022 - Discharge disposition: Skilled Nursing Facility (DC - External)    Destination     Service Provider Selected  Services Address Phone Fax Patient Preferred    [facility]  Skilled Nursing [address] [facility] [facility] [address] [phone] --                    Expected Discharge Date and Time     Expected Discharge Date Expected Discharge Time    Jul 9, 2022          Demographic Summary     Row Name 07/05/22 1250       General Information    General Information Comments Pt's PCP is Cooper Nielsen       Contact Information    Permission Granted to Share Info With     Contact Information Obtained for     Contact Information Comments Helena Salomon(daughter) [phone],  Jennifer Leyva(daughter) [phone], and Mitra Coleman(daughter) [phone]               Functional Status     Row Name 07/05/22 1252       Functional Status    Functional Status Comments Pt is dependent of ADLs               Psychosocial    No documentation.                Abuse/Neglect    No documentation.                Legal    No documentation.                Substance Abuse    No documentation.                Patient Forms    No documentation.                   Mary Ornelas RN

## 2022-07-05 NOTE — CONSULTS
NN has received consult and completed chart review.  Patient was not appropriate for teaching this date, therefore, NN will complete interview and education at next most appropriate opportunity.

## 2022-07-05 NOTE — PROGRESS NOTES
Malnutrition Severity Assessment    Patient Name:  Cooper Covarrubias  YOB: 1931  MRN: [id number]  Admit Date:  7/3/2022    Patient meets criteria for : Severe Malnutrition (PO intakes <50% est. needs and loss 20% body weight past month, mild muscle wasting.)    Malnutrition Severity Assessment  Malnutrition Type: Acute Disease or Injury - Related Malnutrition  Malnutrition Type (last 8 hours)     Malnutrition Severity Assessment     Row Name 07/05/22 1402       Malnutrition Severity Assessment    Malnutrition Type Acute Disease or Injury - Related Malnutrition    Row Name 07/05/22 1402       Insufficient Energy Intake     Insufficient Energy Intake Findings Severe  PO intakes <50% est. needs past month    Insufficient Energy Intake  <50% of est. energy requirement for >or equal to 1 month    Row Name 07/05/22 1402       Unintentional Weight Loss     Unintentional Weight Loss Findings Severe  loss 20% body weight past month    Unintentional Weight Loss  Weight loss greater than 5% in one month    Row Name 07/05/22 1402       Muscle Loss    Loss of Muscle Mass Findings Mild  mild claviuclar, temporalis, and interosseous muscle wasting    Row Name 07/05/22 1402       Fat Loss    Subcutaneous Fat Loss Findings --  YONATHAN    Row Name 07/05/22 1402       Criteria Met (Must meet criteria for severity in at least 2 of these categories: M Wasting, Fat Loss, Fluid, Secondary Signs, Wt. Status, Intake)    Patient meets criteria for  Severe Malnutrition  PO intakes <50% est. needs and loss 20% body weight past month, mild muscle wasting.                Electronically signed by:  Ashley Mckoy RD  07/05/22 14:03 EDT

## 2022-07-05 NOTE — PROGRESS NOTES
Palliative Care Progress Note    Date of Admission: 7/3/2022    Subjective: Patient with no complaints.  Family feels that he is may be slightly more confused today compared to the last day or so.  Current Code Status     Date Active Code Status Order ID Comments User Context       7/4/2022 0215 No CPR (Do Not Attempt to Resuscitate) [id number]  Libby Montague PA-C ED     Advance Care Planning Activity      Questions for Current Code Status     Question Answer    Code Status (Patient has no pulse and is not breathing) No CPR (Do Not Attempt to Resuscitate)    Medical Interventions (Patient has pulse or is breathing) Limited Support    Medical Intervention Limits: NO intubation (DNI)    Level Of Support Discussed With Patient        No current facility-administered medications on file prior to encounter.     Current Outpatient Medications on File Prior to Encounter   Medication Sig Dispense Refill   • acetaminophen (TYLENOL) 325 MG tablet Take 2 tablets by mouth Every 6 (Six) Hours As Needed for Mild Pain .     • albuterol sulfate  (90 Base) MCG/ACT inhaler ProAir  (90 Base) MCG/ACT Inhalation Aerosol Solution; Patient Sig: ProAir  (90 Base) MCG/ACT Inhalation Aerosol Solution INHALE ONE TO TWO PUFFS BY MOUTH EVERY 6 HOURS AS NEEDED; 8.5; 1; 06-Aug-2014; Active     • ammonium lactate (Lac-Hydrin) 12 % lotion Apply to skin bid 500 g 3   • artificial tears (REFRESH LACRI-LUBE) ointment ophthalmic ointment Administer  to both eyes Every 1 (One) Hour As Needed (for eye congestion). One drop each eye BID prn 7 g 6   • azelastine (ASTELIN) 0.1 % nasal spray 2 sprays into the nostril(s) as directed by provider 2 (Two) Times a Day. Use in each nostril as directed 30 mL 4   • fluticasone-salmeterol (ADVAIR HFA) 45-21 MCG/ACT inhaler INHALE TWO PUFFS BY MOUTH TWICE A DAY 1 inhaler 5   • hydrALAZINE (APRESOLINE) 25 MG tablet Take 1 tablet by mouth Every 12 (Twelve) Hours. 60 tablet 0   •  "ipratropium-albuterol (DUO-NEB) 0.5-2.5 mg/3 ml nebulizer Take 3 mL by nebulization Every 4 (Four) Hours As Needed for Wheezing. 360 mL 4   • levothyroxine (SYNTHROID, LEVOTHROID) 137 MCG tablet Take 1 tablet by mouth Every Morning.     • loperamide (IMODIUM) 2 MG capsule Take 1 capsule by mouth 4 (Four) Times a Day As Needed for Diarrhea.     • melatonin 5 MG tablet tablet Take 1 tablet by mouth At Night As Needed (sleep).     • QUEtiapine (SEROquel) 25 MG tablet Take 1 tablet by mouth At Night As Needed (agitation). 12 tablet 0   • Spacer/Aero-Holding Chambers (AEROCHAMBER PLUS RUSTY-VU) misc USE AS DIRECTED PER DOCTOR'S INSTRUCTIONS 1 each 0   • tamsulosin (FLOMAX) 0.4 MG capsule 24 hr capsule Take 1 capsule by mouth Daily. 90 capsule 2   • triamcinolone (KENALOG) 0.1 % ointment Apply 1 application topically to the appropriate area as directed 2 (Two) Times a Day. 80 g 4        •  acetaminophen  •  ipratropium-albuterol  •  melatonin  •  ondansetron  •  QUEtiapine  •  sodium chloride  •  sodium chloride  •  traMADol    Objective: /68 (BP Location: Left arm, Patient Position: Lying)   Pulse 91   Temp 97.5 °F (36.4 °C) (Oral)   Resp 18   Ht 182.9 cm (72\")   Wt 71.2 kg (157 lb)   SpO2 94%   BMI 21.29 kg/m²      Intake/Output Summary (Last 24 hours) at 7/5/2022 1339  Last data filed at 7/5/2022 0900  Gross per 24 hour   Intake 195 ml   Output 800 ml   Net -605 ml     Physical Exam:      General Appearance:    Alert, cooperative, in no acute distress   Head:    Normocephalic, without obvious abnormality, atraumatic   Eyes:            Lids and lashes normal, conjunctivae and sclerae normal, no   icterus, no pallor, corneas clear, PERRLA   Ears:    Ears appear intact with no abnormalities noted   Throat:   No oral lesions, no thrush, oral mucosa moist   Neck:   No adenopathy, supple, trachea midline, no thyromegaly, no     carotid bruit, no JVD   Back:     No kyphosis present, no scoliosis present, no skin " lesions,       erythema or scars, no tenderness to percussion or                   palpation,   range of motion normal   Lungs:     Clear to auscultation,respirations regular, even and                   unlabored    Heart:    Regular rhythm and normal rate, normal S1 and S2, no            murmur, no gallop, no rub, no click   Breast Exam:    Deferred   Abdomen:     Normal bowel sounds, no masses, no organomegaly, soft        non-tender, non-distended, no guarding, no rebound                 tenderness   Genitalia:    Deferred   Extremities:   Moves all extremities well, no edema, no cyanosis, no              redness   Pulses:   Pulses palpable and equal bilaterally   Skin:   No bleeding, bruising or rash   Lymph nodes:   No palpable adenopathy   Neurologic:   Cranial nerves 2 - 12 grossly intact, sensation intact, DTR        present and equal bilaterally     Results from last 7 days   Lab Units 07/05/22  0635   WBC 10*3/mm3 8.69   HEMOGLOBIN g/dL 10.0*   HEMATOCRIT % 31.3*   PLATELETS 10*3/mm3 206     Results from last 7 days   Lab Units 07/05/22  0636 07/04/22  0439   SODIUM mmol/L 143 142   POTASSIUM mmol/L 4.2 4.0   CHLORIDE mmol/L 108* 106   CO2 mmol/L 26.0 26.0   BUN mg/dL 42* 41*   CREATININE mg/dL 1.19 1.42*   CALCIUM mg/dL 9.2 9.1   BILIRUBIN mg/dL  --  0.4   ALK PHOS U/L  --  139*   ALT (SGPT) U/L  --  12   AST (SGOT) U/L  --  13   GLUCOSE mg/dL 157* 126*       Impression: UTI  COPD  Debility  Confusion  GOc    Plan: Did have a long discussion with both of the patient's daughters about goals of care.  They feel that the patient is not able to go back home as he is living by himself and they are not able to take care of him at home.  Case management to talk to family about possible placement options.        Fernando Erickson DO  07/05/22  13:39 EDT

## 2022-07-05 NOTE — PROGRESS NOTES
Clinical Nutrition   Reason For Visit: MST score 2+, Difficulty chewing/swallowing, Unintentional weight loss    Patient Name: Cooper Covarrubias  YOB: 1931  MRN: [id number]  Date of Encounter: 07/05/22 13:49 EDT  Admission date: 7/3/2022    Pt meets criteria severe malnutrition in the context of acute illness, see MSA for full assessment.    RD notes pt followed by hospice/has received home hospice care. Will follow POC/GOC and provide MNT as appropriate and desired by pt/family.    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)    Anxiety and depression    Anemia    Essential hypertension    Elevated troponin    OKSANA (acute kidney injury) (HCC)    Sepsis (HCC)    COPD exacerbation (HCC)     Applicable PMH:    Applicable Nutrition-Related Information:    Applicable medical tests/procedures since admission:  SLP Recommendation and Plan  SLP Swallowing Diagnosis: other (see comments) (no suspected pharyngeal impairment) (07/05/22 1145)  SLP Diet Recommendation: soft textures, ground, thin liquids, other (see comments) (extra sauces/gravies) (07/05/22 1145)    PMH: He  has a past medical history of Anxiety, Arthritis, Basal cell carcinoma, Cataract, COPD (chronic obstructive pulmonary disease) (HCC), Hypertension, Urinary incontinence, and Visual impairment.   PSxH: He  has a past surgical history that includes Cataract extraction and Skin cancer excision.        Reported/Observed/Food/Nutrition Related History     Pt lying in bed trying to eat but does not like his lunch, RD ordered new lunch. He is pleasantly confused, care discussed with 2 daughters at bedside. They report he has had very poor intakes and significant weight loss in the past month since his last admission. He will eat a few bites and be done. Drinks Boost but not regularly. They deny signs dysphagia or c/o  altered GI function. They deny pt having further dietary needs, do provide some preferences, NKFA.    Anthropometrics   Height: 72 in  Weight: 157 lb per bed scale 7/4  BMI: 21  BMI classification: Normal: 18.5-24.9kg/m2   IBW: 171 lb    UBW: ~195 lb per dtrs- confirmed was 197 lb on 6/3/22 per EMR  Weight change: loss 40 lb / 20% body weight past month    Nutrition Focused Physical Exam     Unable to perform exam due to: Potential for patient discomfort   Limited exam completed to avoid increasing pt's anxiety, mild muscle wasting evident, see MSA for full assessment.     Labs reviewed   Labs reviewed: Yes    Results from last 7 days   Lab Units 07/05/22  0636 07/04/22  0439 07/03/22  2249   SODIUM mmol/L 143 142 145   POTASSIUM mmol/L 4.2 4.0 3.9   CHLORIDE mmol/L 108* 106 107   CO2 mmol/L 26.0 26.0 29.0   BUN mg/dL 42* 41* 41*   CREATININE mg/dL 1.19 1.42* 1.44*   GLUCOSE mg/dL 157* 126* 115*   CALCIUM mg/dL 9.2 9.1 9.0   PHOSPHORUS mg/dL  --   --  3.0   MAGNESIUM mg/dL  --   --  2.0     Results from last 7 days   Lab Units 07/05/22  0635 07/04/22  0439 07/03/22  2249   WBC 10*3/mm3 8.69 9.76 8.74   ALBUMIN g/dL  --  2.30* 2.50*   CRP mg/dL  --   --  20.21*         Lab Results   Lab Value Date/Time    HGBA1C 5.7 09/14/2017 1247     Medications reviewed   Medications reviewed: Yes  Scheduled: heparin, steroids  GTT:  PRN:    Current Nutrition Prescription   PO: Diet Soft Texture; Ground; Thin; Cardiac  Oral Nutrition Supplement: Orders Placed This Encounter      DIET MESSAGE Please bring late lunch tray. Thanks!      DIET MESSAGE Please send 2nd lunch of chicken salad and veg soup- did not like lunch tray. Thank you!       Average PO intake: 25% X 5 meals documented    Nutrition Diagnosis     Problem Malnutrition severe/acute   Etiology Energy needs>energy intake, COPD   Signs/Symptoms PO intakes <50% est. needs and loss 20% body weight past month, mild muscle wasting.     Goal:   General: Nutrition to support  treatment  PO: Increase intake     Intervention   Intervention: Follow treatment progress, Care plan reviewed, Advise alternate selection, Advised available snacks, Interview for preferences, Menu provided, Encourage intake, Supplement provided, Education provided    Diet liberalized from cardiac to regular  Preferences in order, alternatives encouraged  Boost all meals    Monitoring/Evaluation:   Monitoring/Evaluation: Per protocol, I&O, PO intake, Supplement intake, Pertinent labs, Weight, Skin status, GI status, Symptoms, POC/GOC, Swallow function    Ashley Mckoy RD  Time Spent: 45

## 2022-07-06 PROBLEM — J96.21 ACUTE ON CHRONIC RESPIRATORY FAILURE WITH HYPOXIA (HCC): Status: ACTIVE | Noted: 2022-01-01

## 2022-07-06 NOTE — CONSULTS
Referring Provider: MD Lori  Reason for Consultation: AoCRF    Subjective .   Education: NN spoke with daughters at .  They report that they hope to continue a comfort focused plan of care for their father.  He is a former smoker, quit date unclear.  He is UTD on vaccinations.  As he has been Hospice, there have not been any issues with medications or transportation for appointments.  COPD education as it pertains to end of life was discussed.  Questions were answered to the best of my ability.  No other questions or concerns at this time.  NN continues to follow.    Age: 89 y.o.  Sex: male  Smoker Status: former, ~60 pack years  Pulmonologist: HANH  FEV1 (PFT): NA  Home O2: 2L    Objective     SpO2 SpO2: 94 % (07/06/22 1015)  Device Device (Oxygen Therapy): room air (07/06/22 1200)  Flow Flow (L/min): 2 (07/05/22 2200)  Incentive Spirometer    IS Predicted Level (mL)     Number of Repetitions     Level Incentive Spirometer (mL)    Patient Tolerance     Inhaler Treatment Status    Treatment Route        Home Medications:  Medications Prior to Admission   Medication Sig Dispense Refill Last Dose   • acetaminophen (TYLENOL) 325 MG tablet Take 2 tablets by mouth Every 6 (Six) Hours As Needed for Mild Pain .      • albuterol sulfate  (90 Base) MCG/ACT inhaler ProAir  (90 Base) MCG/ACT Inhalation Aerosol Solution; Patient Sig: ProAir  (90 Base) MCG/ACT Inhalation Aerosol Solution INHALE ONE TO TWO PUFFS BY MOUTH EVERY 6 HOURS AS NEEDED; 8.5; 1; 06-Aug-2014; Active      • ammonium lactate (Lac-Hydrin) 12 % lotion Apply to skin bid 500 g 3    • artificial tears (REFRESH LACRI-LUBE) ointment ophthalmic ointment Administer  to both eyes Every 1 (One) Hour As Needed (for eye congestion). One drop each eye BID prn 7 g 6    • azelastine (ASTELIN) 0.1 % nasal spray 2 sprays into the nostril(s) as directed by provider 2 (Two) Times a Day. Use in each nostril as directed 30 mL 4    • fluticasone-salmeterol  (ADVAIR HFA) 45-21 MCG/ACT inhaler INHALE TWO PUFFS BY MOUTH TWICE A DAY 1 inhaler 5    • hydrALAZINE (APRESOLINE) 25 MG tablet Take 1 tablet by mouth Every 12 (Twelve) Hours. 60 tablet 0    • ipratropium-albuterol (DUO-NEB) 0.5-2.5 mg/3 ml nebulizer Take 3 mL by nebulization Every 4 (Four) Hours As Needed for Wheezing. 360 mL 4    • levothyroxine (SYNTHROID, LEVOTHROID) 137 MCG tablet Take 1 tablet by mouth Every Morning.      • loperamide (IMODIUM) 2 MG capsule Take 1 capsule by mouth 4 (Four) Times a Day As Needed for Diarrhea.      • melatonin 5 MG tablet tablet Take 1 tablet by mouth At Night As Needed (sleep).      • QUEtiapine (SEROquel) 25 MG tablet Take 1 tablet by mouth At Night As Needed (agitation). 12 tablet 0    • Spacer/Aero-Holding Chambers (AEROCHAMBER PLUS RUSTY-VU) misc USE AS DIRECTED PER DOCTOR'S INSTRUCTIONS 1 each 0    • tamsulosin (FLOMAX) 0.4 MG capsule 24 hr capsule Take 1 capsule by mouth Daily. 90 capsule 2    • triamcinolone (KENALOG) 0.1 % ointment Apply 1 application topically to the appropriate area as directed 2 (Two) Times a Day. 80 g 4        Discussion: Per current GOLD Standards, please consider: Hospice patient with advanced age        Vivian Abdalla RN

## 2022-07-06 NOTE — PLAN OF CARE
Goal Outcome Evaluation:  Plan of Care Reviewed With: patient           Outcome Evaluation: Pt. asleep and did not demonstrate any visible signs of discomfort during visit.  Per family report, pt. was agitated and took his gown off earlier this morning.  Did not attempt to wake patient.   Spoke with daughters outside of room regarding POC.  Daughters realize patient is declining and will not return to baseline.  They state that pt. can become agitated and combative.  Daughters would [facility]  Hospice liason came during visit and addressed questions.  Due to [facility]id[facility] not [facility]  [facility] at this [facility].  Palliative to continue to follow for support, POC and ongoing GOC conversations.      1330 Palliative IDT meeting: ALONZO Rodriguez RN, CHPN; KESHIA Voss, RN, PN; EYAD Franklin, APRN; COLE Erickson, DO; SARAH Black, [facility]; DAVID Paz MDiv, [facility]; DAVID Treadwell RN, PN; SVITLANA Nails, RN

## 2022-07-06 NOTE — CASE MANAGEMENT/SOCIAL WORK
Continued Stay Note   [place]     Patient Name: Cooper Covarrubias  MRN: [id number]  Today's Date: 7/6/2022    Admit Date: 7/3/2022     Discharge Plan     Row Name 07/06/22 1240       Plan    Plan discharge plan    Plan Comments I met with 2 of pt's daughter per their request and they would like for pt to be inpatient hospice at [facility].  I asked Malena Dodd(hospice liason to speak with daughters/family) Per Malena, [facility]. Dr Swan updated. CM will cont to follow    Final Discharge Disposition Code 30 - still a patient               Discharge Codes    No documentation.               Expected Discharge Date and Time     Expected Discharge Date Expected Discharge Time    Jul 9, 2022             Mary Ornelas RN

## 2022-07-06 NOTE — PLAN OF CARE
Problem: Fall Injury Risk  Goal: Absence of Fall and Fall-Related Injury  Outcome: Ongoing, Progressing  Intervention: Identify and Manage Contributors  Recent Flowsheet Documentation  Taken 7/6/2022 0800 by Mckayla Garcia RN  Medication Review/Management: medications reviewed  Intervention: Promote Injury-Free Environment  Recent Flowsheet Documentation  Taken 7/6/2022 1600 by Mckayla Garcia RN  Safety Promotion/Fall Prevention:   activity supervised   assistive device/personal items within reach   clutter free environment maintained   fall prevention program maintained   nonskid shoes/slippers when out of bed   room organization consistent   safety round/check completed   toileting scheduled  Taken 7/6/2022 1400 by Mckayla Garcia RN  Safety Promotion/Fall Prevention:   activity supervised   clutter free environment maintained   assistive device/personal items within reach   fall prevention program maintained   nonskid shoes/slippers when out of bed   room organization consistent   safety round/check completed   toileting scheduled  Taken 7/6/2022 1200 by Mckayla Garcia RN  Safety Promotion/Fall Prevention:   activity supervised   assistive device/personal items within reach   clutter free environment maintained   fall prevention program maintained   nonskid shoes/slippers when out of bed   room organization consistent   safety round/check completed   toileting scheduled  Taken 7/6/2022 1000 by Mckayla Garcia RN  Safety Promotion/Fall Prevention:   activity supervised   assistive device/personal items within reach   clutter free environment maintained   fall prevention program maintained   nonskid shoes/slippers when out of bed   room organization consistent   safety round/check completed   toileting scheduled  Taken 7/6/2022 0800 by Mckayla Garcia RN  Safety Promotion/Fall Prevention:   activity supervised   assistive device/personal items within reach   clutter free environment maintained    fall prevention program maintained   nonskid shoes/slippers when out of bed   safety round/check completed   toileting scheduled   room organization consistent     Problem: Adult Inpatient Plan of Care  Goal: Plan of Care Review  Outcome: Ongoing, Progressing  Flowsheets (Taken 7/6/2022 1628)  Progress: no change  Plan of Care Reviewed With:   patient   daughter  Goal: Patient-Specific Goal (Individualized)  Outcome: Ongoing, Progressing  Goal: Absence of Hospital-Acquired Illness or Injury  Outcome: Ongoing, Progressing  Intervention: Identify and Manage Fall Risk  Recent Flowsheet Documentation  Taken 7/6/2022 1600 by Mckayla Garcia RN  Safety Promotion/Fall Prevention:   activity supervised   assistive device/personal items within reach   clutter free environment maintained   fall prevention program maintained   nonskid shoes/slippers when out of bed   room organization consistent   safety round/check completed   toileting scheduled  Taken 7/6/2022 1400 by Mckayla Garcia RN  Safety Promotion/Fall Prevention:   activity supervised   clutter free environment maintained   assistive device/personal items within reach   fall prevention program maintained   nonskid shoes/slippers when out of bed   room organization consistent   safety round/check completed   toileting scheduled  Taken 7/6/2022 1200 by Mckayla Garcia, RN  Safety Promotion/Fall Prevention:   activity supervised   assistive device/personal items within reach   clutter free environment maintained   fall prevention program maintained   nonskid shoes/slippers when out of bed   room organization consistent   safety round/check completed   toileting scheduled  Taken 7/6/2022 1000 by Mckayla Garcia, RN  Safety Promotion/Fall Prevention:   activity supervised   assistive device/personal items within reach   clutter free environment maintained   fall prevention program maintained   nonskid shoes/slippers when out of bed   room organization consistent    safety round/check completed   toileting scheduled  Taken 7/6/2022 0800 by Mckayla Garcia RN  Safety Promotion/Fall Prevention:   activity supervised   assistive device/personal items within reach   clutter free environment maintained   fall prevention program maintained   nonskid shoes/slippers when out of bed   safety round/check completed   toileting scheduled   room organization consistent  Intervention: Prevent Skin Injury  Recent Flowsheet Documentation  Taken 7/6/2022 1600 by Mckayla Garcia RN  Body Position:   weight shifting   tilted   supine, legs elevated   upper extremity elevated   left  Skin Protection:   adhesive use limited   incontinence pads utilized   tubing/devices free from skin contact   transparent dressing maintained   skin-to-skin areas padded   skin-to-device areas padded  Taken 7/6/2022 1400 by Mckayla Garcia RN  Body Position:   weight shifting   upper extremity elevated   tilted   supine, legs elevated   right  Skin Protection:   adhesive use limited   incontinence pads utilized   tubing/devices free from skin contact   transparent dressing maintained   skin-to-skin areas padded   skin-to-device areas padded  Taken 7/6/2022 1200 by Mckayla Garcia RN  Body Position:   weight shifting   upper extremity elevated   supine, legs elevated   tilted   right  Skin Protection:   adhesive use limited   incontinence pads utilized   tubing/devices free from skin contact   transparent dressing maintained   skin-to-device areas padded   skin-to-skin areas padded  Taken 7/6/2022 1000 by Mckayla Garcia RN  Body Position:   weight shifting   tilted   supine, legs elevated   upper extremity elevated   left  Skin Protection:   adhesive use limited   incontinence pads utilized   tubing/devices free from skin contact   transparent dressing maintained   skin-to-skin areas padded   skin-to-device areas padded  Taken 7/6/2022 0800 by Mckayla Garcia RN  Body Position:   weight shifting   upper  extremity elevated   tilted   supine, legs elevated   right  Skin Protection:   adhesive use limited   incontinence pads utilized   tubing/devices free from skin contact   transparent dressing maintained   skin-to-skin areas padded   skin-to-device areas padded   skin sealant/moisture barrier applied   protective footwear used   pulse oximeter probe site changed  Intervention: Prevent and Manage VTE (Venous Thromboembolism) Risk  Recent Flowsheet Documentation  Taken 7/6/2022 1600 by Mckayla Garcia RN  Activity Management:   activity adjusted per tolerance   activity encouraged  Taken 7/6/2022 1400 by Mckayla Garcia RN  Activity Management:   activity adjusted per tolerance   activity encouraged  Taken 7/6/2022 1200 by Mckayla Garcia RN  Activity Management:   activity adjusted per tolerance   activity encouraged  Taken 7/6/2022 1000 by Mckayla Garcia RN  Activity Management:   activity adjusted per tolerance   activity encouraged  Taken 7/6/2022 0800 by Mckayla Garcia RN  Activity Management:   activity adjusted per tolerance   activity encouraged  VTE Prevention/Management:   bleeding risk factor(s) identified   dorsiflexion/plantar flexion performed  Intervention: Prevent Infection  Recent Flowsheet Documentation  Taken 7/6/2022 1600 by Mckayla Garcia RN  Infection Prevention:   environmental surveillance performed   hand hygiene promoted   personal protective equipment utilized   rest/sleep promoted  Taken 7/6/2022 1400 by Mckayla Garcia RN  Infection Prevention:   environmental surveillance performed   hand hygiene promoted   personal protective equipment utilized   rest/sleep promoted  Taken 7/6/2022 1200 by Mckayla Garcia RN  Infection Prevention:   environmental surveillance performed   personal protective equipment utilized   rest/sleep promoted   hand hygiene promoted  Taken 7/6/2022 1000 by Mckayla Garcia RN  Infection Prevention:   environmental surveillance performed    personal protective equipment utilized   rest/sleep promoted   hand hygiene promoted  Taken 7/6/2022 0800 by Mckayla Garica RN  Infection Prevention:   environmental surveillance performed   hand hygiene promoted   personal protective equipment utilized   rest/sleep promoted  Goal: Optimal Comfort and Wellbeing  Outcome: Ongoing, Progressing  Intervention: Monitor Pain and Promote Comfort  Recent Flowsheet Documentation  Taken 7/6/2022 1600 by Mckayla Garcia RN  Pain Management Interventions:   quiet environment facilitated   relaxation techniques promoted  Taken 7/6/2022 1400 by Mckayla Garcia RN  Pain Management Interventions:   quiet environment facilitated   relaxation techniques promoted  Taken 7/6/2022 1200 by Mckayla Garcia RN  Pain Management Interventions:   quiet environment facilitated   relaxation techniques promoted  Taken 7/6/2022 1000 by Mckayla Garcia RN  Pain Management Interventions:   quiet environment facilitated   relaxation techniques promoted  Taken 7/6/2022 0800 by Mckayla Garcia RN  Pain Management Interventions:   quiet environment facilitated   relaxation techniques promoted  Intervention: Provide Person-Centered Care  Recent Flowsheet Documentation  Taken 7/6/2022 1600 by Mckayla Garcia RN  Trust Relationship/Rapport:   care explained   choices provided   emotional support provided   empathic listening provided   questions answered   questions encouraged   reassurance provided   thoughts/feelings acknowledged  Taken 7/6/2022 1400 by Mckayla Garcia RN  Trust Relationship/Rapport:   care explained   choices provided   emotional support provided   empathic listening provided   questions answered   questions encouraged   reassurance provided   thoughts/feelings acknowledged  Taken 7/6/2022 1200 by Mckayla Garcia RN  Trust Relationship/Rapport:   care explained   choices provided   emotional support provided   empathic listening provided   questions answered    questions encouraged   reassurance provided   thoughts/feelings acknowledged  Taken 7/6/2022 1000 by Mckayla Garcia RN  Trust Relationship/Rapport:   care explained   choices provided   emotional support provided   empathic listening provided   questions answered   questions encouraged   reassurance provided   thoughts/feelings acknowledged  Taken 7/6/2022 0800 by Mckayla Garcia RN  Trust Relationship/Rapport:   care explained   choices provided   emotional support provided   empathic listening provided   questions answered   questions encouraged   reassurance provided   thoughts/feelings acknowledged  Goal: Readiness for Transition of Care  Outcome: Ongoing, Progressing     Problem: COPD (Chronic Obstructive Pulmonary Disease) Comorbidity  Goal: Maintenance of COPD Symptom Control  Outcome: Ongoing, Progressing  Intervention: Maintain COPD-Symptom Control  Recent Flowsheet Documentation  Taken 7/6/2022 0800 by Mckayla Garcia RN  Medication Review/Management: medications reviewed     Problem: Hypertension Comorbidity  Goal: Blood Pressure in Desired Range  Outcome: Ongoing, Progressing  Intervention: Maintain Blood Pressure Management  Recent Flowsheet Documentation  Taken 7/6/2022 0800 by Mckayla Garcia RN  Medication Review/Management: medications reviewed     Problem: Skin Injury Risk Increased  Goal: Skin Health and Integrity  Outcome: Ongoing, Progressing  Intervention: Optimize Skin Protection  Recent Flowsheet Documentation  Taken 7/6/2022 1600 by Mckayla Garcia RN  Pressure Reduction Techniques:   frequent weight shift encouraged   heels elevated off bed   positioned off wounds   pressure points protected   rest period provided between sit times  Head of Bed (HOB) Positioning: HOB at 30-45 degrees  Pressure Reduction Devices:   specialty bed utilized   positioning supports utilized   heel offloading device utilized   foam padding utilized   feet on footrest/footstool  Skin Protection:    adhesive use limited   incontinence pads utilized   tubing/devices free from skin contact   transparent dressing maintained   skin-to-skin areas padded   skin-to-device areas padded  Taken 7/6/2022 1400 by Mckayla Garcia RN  Pressure Reduction Techniques:   frequent weight shift encouraged   heels elevated off bed   positioned off wounds   rest period provided between sit times   pressure points protected   weight shift assistance provided   sit time limited to 2 hours  Head of Bed (HOB) Positioning: HOB at 45 degrees  Pressure Reduction Devices:   specialty bed utilized   positioning supports utilized   heel offloading device utilized   feet on footrest/footstool   elbow protectors utilized   foam padding utilized  Skin Protection:   adhesive use limited   incontinence pads utilized   tubing/devices free from skin contact   transparent dressing maintained   skin-to-skin areas padded   skin-to-device areas padded  Taken 7/6/2022 1200 by Mckayla Garcia RN  Pressure Reduction Techniques:   pressure points protected   rest period provided between sit times   positioned off wounds   heels elevated off bed   frequent weight shift encouraged   weight shift assistance provided  Head of Bed (HOB) Positioning: HOB at 30-45 degrees  Pressure Reduction Devices:   specialty bed utilized   positioning supports utilized   heel offloading device utilized   foam padding utilized  Skin Protection:   adhesive use limited   incontinence pads utilized   tubing/devices free from skin contact   transparent dressing maintained   skin-to-device areas padded   skin-to-skin areas padded  Taken 7/6/2022 1000 by Mckayla Garcia RN  Pressure Reduction Techniques:   frequent weight shift encouraged   heels elevated off bed   positioned off wounds   pressure points protected   rest period provided between sit times   weight shift assistance provided  Head of Bed (HOB) Positioning: HOB at 30-45 degrees  Pressure Reduction Devices:    specialty bed utilized   positioning supports utilized   heel offloading device utilized   foam padding utilized   feet on footrest/footstool  Skin Protection:   adhesive use limited   incontinence pads utilized   tubing/devices free from skin contact   transparent dressing maintained   skin-to-skin areas padded   skin-to-device areas padded  Taken 7/6/2022 0800 by Mckayla Garcia RN  Pressure Reduction Techniques:   heels elevated off bed   frequent weight shift encouraged   pressure points protected   rest period provided between sit times   positioned off wounds   sit time limited to 2 hours   weight shift assistance provided  Head of Bed (HOB) Positioning: HOB at 45 degrees  Pressure Reduction Devices:   specialty bed utilized   positioning supports utilized   heel offloading device utilized   foam padding utilized   feet on footrest/footstool  Skin Protection:   adhesive use limited   incontinence pads utilized   tubing/devices free from skin contact   transparent dressing maintained   skin-to-skin areas padded   skin-to-device areas padded   skin sealant/moisture barrier applied   protective footwear used   pulse oximeter probe site changed     Problem: Adjustment to Illness COPD (Chronic Obstructive Pulmonary Disease)  Goal: Optimal Chronic Illness Coping  Outcome: Ongoing, Progressing  Intervention: Support and Optimize Psychosocial Response  Recent Flowsheet Documentation  Taken 7/6/2022 1600 by Mckayla Garcia RN  Family/Support System Care:   self-care encouraged   support provided  Taken 7/6/2022 1400 by Mckayla Garcia RN  Family/Support System Care:   self-care encouraged   support provided  Taken 7/6/2022 1200 by Mckayla Garcia RN  Family/Support System Care:   self-care encouraged   support provided  Taken 7/6/2022 1000 by Mckayla Garcia RN  Family/Support System Care:   self-care encouraged   support provided  Taken 7/6/2022 0800 by Mckayla Garcia RN  Family/Support System Care:    self-care encouraged   support provided     Problem: Functional Ability Impaired COPD (Chronic Obstructive Pulmonary Disease)  Goal: Optimal Level of Functional [place]  Outcome: Ongoing, Progressing  Intervention: Optimize Functional Ability  Recent Flowsheet Documentation  Taken 7/6/2022 1600 by Mckayla Garcia RN  Activity Management:   activity adjusted per tolerance   activity encouraged  Taken 7/6/2022 1400 by Mckayla Garcia RN  Activity Management:   activity adjusted per tolerance   activity encouraged  Taken 7/6/2022 1200 by Mckayla Garcia RN  Activity Management:   activity adjusted per tolerance   activity encouraged  Taken 7/6/2022 1000 by Mckayla Garcia RN  Activity Management:   activity adjusted per tolerance   activity encouraged  Taken 7/6/2022 0800 by Mckayla Garcia RN  Activity Management:   activity adjusted per tolerance   activity encouraged     Problem: Infection COPD (Chronic Obstructive Pulmonary Disease)  Goal: Absence of Infection Signs and Symptoms  Outcome: Ongoing, Progressing     Problem: Oral Intake Inadequate COPD (Chronic Obstructive Pulmonary Disease)  Goal: Improved Nutrition Intake  Outcome: Ongoing, Progressing     Problem: Respiratory Compromise COPD (Chronic Obstructive Pulmonary Disease)  Goal: Effective Oxygenation and Ventilation  Outcome: Ongoing, Progressing  Intervention: Promote Airway Secretion Clearance  Recent Flowsheet Documentation  Taken 7/6/2022 1600 by Mckayla Garcia RN  Activity Management:   activity adjusted per tolerance   activity encouraged  Cough And Deep Breathing: done with encouragement  Taken 7/6/2022 1400 by Mckayla Garcia RN  Activity Management:   activity adjusted per tolerance   activity encouraged  Cough And Deep Breathing: done with encouragement  Taken 7/6/2022 1200 by Mckayla Garcia RN  Activity Management:   activity adjusted per tolerance   activity encouraged  Cough And Deep Breathing: done with  encouragement  Taken 7/6/2022 1000 by Mckayla Garcia RN  Activity Management:   activity adjusted per tolerance   activity encouraged  Cough And Deep Breathing: done with encouragement  Taken 7/6/2022 0800 by Mckayla Garcia RN  Activity Management:   activity adjusted per tolerance   activity encouraged  Cough And Deep Breathing: done with encouragement  Intervention: Optimize Oxygenation and Ventilation  Recent Flowsheet Documentation  Taken 7/6/2022 1600 by Mckayla Garcia RN  Head of Bed (HOB) Positioning: HOB at 30-45 degrees  Taken 7/6/2022 1400 by Mckayla Garcia RN  Head of Bed (HOB) Positioning: HOB at 45 degrees  Taken 7/6/2022 1200 by Mckayla Garcia RN  Head of Bed (HOB) Positioning: HOB at 30-45 degrees  Taken 7/6/2022 1000 by Mckayla Garcia RN  Head of Bed (HOB) Positioning: HOB at 30-45 degrees  Taken 7/6/2022 0800 by Mckayla Garcia RN  Head of Bed (HOB) Positioning: HOB at 45 degrees     Problem: Palliative Care  Goal: Enhanced Quality of Life  Outcome: Ongoing, Progressing  Intervention: Maximize Comfort  Recent Flowsheet Documentation  Taken 7/6/2022 1600 by Mckayla Garcia RN  Pain Management Interventions:   quiet environment facilitated   relaxation techniques promoted  Taken 7/6/2022 1400 by Mckayla Garcia RN  Pain Management Interventions:   quiet environment facilitated   relaxation techniques promoted  Taken 7/6/2022 1200 by Mckayla Garcia RN  Pain Management Interventions:   quiet environment facilitated   relaxation techniques promoted  Taken 7/6/2022 1000 by Mckayla Garcia RN  Pain Management Interventions:   quiet environment facilitated   relaxation techniques promoted  Taken 7/6/2022 0800 by Mckayla Gacria RN  Pain Management Interventions:   quiet environment facilitated   relaxation techniques promoted  Intervention: Optimize Psychosocial Wellbeing  Recent Flowsheet Documentation  Taken 7/6/2022 1600 by Mckayla Garcia RN  Family/Support System  Care:   self-care encouraged   support provided  Taken 7/6/2022 1400 by Mckayla Garcia, RN  Family/Support System Care:   self-care encouraged   support provided  Taken 7/6/2022 1200 by Mckayla Garcia, RN  Family/Support System Care:   self-care encouraged   support provided  Taken 7/6/2022 1000 by Mckayla Garcia, RN  Family/Support System Care:   self-care encouraged   support provided  Taken 7/6/2022 0800 by Mckayla Garcia, RN  Family/Support System Care:   self-care encouraged   support provided   Goal Outcome Evaluation:  Plan of Care Reviewed With: patient, daughter        Progress: no change

## 2022-07-06 NOTE — PLAN OF CARE
Goal Outcome Evaluation:  Plan of Care Reviewed With: patient, daughter        Progress: no change  Outcome Evaluation: Rolled L/R w/ max A (able to grasp & hold bedrail) for ther exer sidely., & scooted to HOB x 2 w/ draw sheet (Dep.1 to 2A). Performed ther exer in sup & sidely, & wash cloth rolls placed in hands to relieve anxiousness(clutching blanket w/ vise ). Def. EOB or STS d/t decr cognit status after med to calm s/p agit/pulling IV out x 2. Noted HR 92 & desat 94%.

## 2022-07-06 NOTE — THERAPY TREATMENT NOTE
Patient Name: Cooper Covarrubias  : 1931    MRN: [id number]                              Today's Date: 2022       Admit Date: 7/3/2022    Visit Dx:     ICD-10-CM ICD-9-CM   1. Acute on chronic respiratory failure with hypoxia (HCC)  J96.21 518.84     799.02   2. OKSANA (acute kidney injury) (HCC)  N17.9 584.9   3. Elevated troponin  R77.8 790.6   4. Dysphagia, unspecified type  R13.10 787.20     Patient Active Problem List   Diagnosis   • Asthma   • Anxiety   • Urinary incontinence   • BPH with urinary obstruction   • Nocturia   • Arthritis   • Vitamin D deficiency   • Acute exacerbation of COPD with asthma (HCC)   • Conjunctivitis   • Anxiety and depression   • Declining functional status   • Anemia   • Essential hypertension   • Hyponatremia   • SIADH (syndrome of inappropriate ADH production) (HCC)   • Acute on chronic respiratory failure (HCC)   • Elevated troponin   • OKSANA (acute kidney injury) (HCC)   • Sepsis (HCC)   • COPD exacerbation (HCC)   • Severe malnutrition (HCC)     Past Medical History:   Diagnosis Date   • Anxiety    • Arthritis    • Basal cell carcinoma    • Cataract    • COPD (chronic obstructive pulmonary disease) (HCC)    • Hypertension    • Urinary incontinence    • Visual impairment      Past Surgical History:   Procedure Laterality Date   • CATARACT EXTRACTION     • SKIN CANCER EXCISION      , , , [id number]      General Information     Row Name 22 1311          Physical Therapy Time and Intention    Document Type therapy note (daily note)  -DM     Mode of Treatment physical therapy  -DM     Row Name 22 1311          General Information    Existing Precautions/Restrictions fall;other (see comments)  OKSANA sepsis/conf.;chandler;A/D  -DM           User Key  (r) = Recorded By, (t) = Taken By, (c) = Cosigned By    Initials Name Provider Type    DM Malena Wilson, PT Physical Therapist               Mobility     Row Name 22 1311          Bed Mobility    Bed  "Mobility rolling left;rolling right;scooting/bridging  -DM     Rolling Left [place] (Bed Mobility) verbal cues;maximum assist (25% patient effort)  -DM     Rolling Right [place] (Bed Mobility) verbal cues;maximum assist (25% patient effort)  -DM     Scooting/Bridging [place] (Bed Mobility) verbal cues;dependent (less than 25% patient effort);2 person assist  scooted to HOB x 2  -DM     Supine-Sit [place] (Bed Mobility) unable to assess  def. d/t poor compliance w/ cues  -DM     Assistive Device (Bed Mobility) bed rails;draw sheet;head of bed elevated  -DM     Comment, (Bed Mobility) per dtrs, just woke up after being asleep for 4 hrs s/p med to relax him after episode of agit./pulling out IV x 2 & gown off x 1 (R.T. had attempted to arouse for treatment but unable); did not verbalize for PT,& has dazed look; glancing back & forth from mirror to window;dtrs state he usually verbalizes his name & theirs, but unable to when PT asked, then nodded \"no\" when pt was told his dtrs' names & then was asked if they were his dtrs.;when rolled for ther.exer,dtr inquired about brief for incont.bowels (had used Depends); PT placed dry flow pad under & btwn legs  -DM     Row Name 07/06/22 1311          Transfers    Comment, (Transfers) def. d/t decr. cognit. level this session  -DM     Row Name 07/06/22 1311          Sit-Stand Transfer    Sit-Stand [place] (Transfers) unable to assess  -DM     Row Name 07/06/22 1311          Gait/Stairs (Locomotion)    [place] Level (Gait) unable to assess  -DM     Comment, (Gait/Stairs) per dtrs., is non-amb.at baseline (\"stays in the bed\")  -DM           User Key  (r) = Recorded By, (t) = Taken By, (c) = Cosigned By    Initials Name Provider Type    Malena Valdez, PT Physical Therapist               Obj/Interventions     Row Name 07/06/22 1311          Motor Skills    Therapeutic Exercise shoulder;hip;knee;ankle  -DM     Row Name 07/06/22 1311          Shoulder " (Therapeutic Exercise)    Shoulder (Therapeutic Exercise) AROM (active range of motion)  -DM     Shoulder AROM (Therapeutic Exercise) bilateral;extension;aDduction;10 repetitions;supine  -DM     Shoulder AAROM (Therapeutic Exercise) bilateral;flexion;aBduction;external rotation;internal rotation;horizontal aBduction/aDduction;supine;10 repetitions;other (see comments)  biceps curls  -DM     Row Name 07/06/22 1311          Hip (Therapeutic Exercise)    Hip (Therapeutic Exercise) AAROM (active assistive range of motion);PROM (passive range of motion)  -DM     Hip AAROM (Therapeutic Exercise) bilateral;flexion;extension;aBduction;aDduction;supine;sidelying;10 repetitions  also did B BKFO in sup.w/ min A, & hip & knee F/E in Sidelying w/ mod A  -DM     Hip PROM (Therapeutic Exercise) bilateral;external rotation;internal rotation;supine;10 repetitions  -DM     Row Name 07/06/22 1311          Knee (Therapeutic Exercise)    Knee (Therapeutic Exercise) AAROM (active assistive range of motion);PROM (passive range of motion)  -DM     Knee AAROM (Therapeutic Exercise) bilateral;flexion;extension;sidelying;supine;10 repetitions  h.slides & SAQ in sup.; knee to chest in sidely  -DM     Knee PROM (Therapeutic Exercise) bilateral;extension;sidelying;10 repetitions  pass. knee ext w/ LAQ in sidely.  -DM     Row Name 07/06/22 1311          Ankle (Therapeutic Exercise)    Ankle (Therapeutic Exercise) AAROM (active assistive range of motion);PROM (passive range of motion)  -DM     Ankle AAROM (Therapeutic Exercise) bilateral;dorsiflexion;plantarflexion;supine;10 repetitions  -DM     Ankle PROM (Therapeutic Exercise) bilateral;supine;10 repetitions;other (see comments)  AC  -DM           User Key  (r) = Recorded By, (t) = Taken By, (c) = Cosigned By    Initials Name Provider Type    Malena Valdez PT Physical Therapist               Goals/Plan    No documentation.                Clinical Impression     Row Name 07/06/22 1311           Pain    Pre/Posttreatment Pain Comment after PT left to get washcloth rolls for hands, pt told dtr he had discomf in chest; nsg notified by dtr.; dtr notes pt has chr. back & leg discomf.  -DM     Pain Intervention(s) Repositioned;Elevated;Rest  -DM     Additional Documentation Pain Scale: FACES Pre/Post-Treatment (Group)  -DM     Row Name 07/06/22 1311          Pain Scale: FACES Pre/Post-Treatment    Pain: FACES Scale, Pretreatment 0-->no hurt  -DM     Posttreatment Pain Rating 2-->hurts little bit  -DM     Pain Location - chest  -DM     Row Name 07/06/22 1311          Plan of Care Review    Plan of Care Reviewed With patient;daughter  -DM     Progress no change  -DM     Outcome Evaluation Rolled L/R w/ max A (able to grasp & hold bedrail) for ther exer sidely., & scooted to HOB x 2 w/ draw sheet (Dep.1 to 2A). Performed ther exer in sup & sidely, & wash cloth rolls placed in hands to relieve anxiousness(clutching blanket w/ vise ). Def. EOB or STS d/t decr cognit status after med to calm s/p agit/pulling IV out x 2. Noted HR 92 & desat 94%.  -DM     Row Name 07/06/22 1311          Vital Signs    Pre Systolic BP Rehab 142  -DM     Pre Treatment Diastolic BP 81  -DM     Post Systolic BP Rehab 129  -DM     Post Treatment Diastolic BP 64  -DM     Pretreatment Heart Rate (beats/min) 88  -DM     Posttreatment Heart Rate (beats/min) 92  -DM     Intratreatment Resp Rate (breaths/min) 83  -DM     Pre SpO2 (%) 97  -DM     O2 Delivery Pre Treatment room air  -DM     Intra SpO2 (%) 94  -DM     O2 Delivery Intra Treatment room air  -DM     Post SpO2 (%) 96  -DM     O2 Delivery Post Treatment room air  -DM     Pre Patient Position Supine  -DM     Intra Patient Position Side Lying  -DM     Post Patient Position Supine  -DM     Row Name 07/06/22 1311          Positioning and Restraints    Pre-Treatment Position in bed  -DM     Post Treatment Position bed  -DM     In Bed notified nsg;supine;call light within  reach;encouraged to call for assist;exit alarm on;with family/caregiver;heels elevated  -DM           User Key  (r) = Recorded By, (t) = Taken By, (c) = Cosigned By    Initials Name Provider Type    Malena Valdez, PT Physical Therapist               Outcome Measures     Row Name 07/06/22 1311 07/06/22 0800       How much help from another person do you currently need...    Turning from your back to your side while in flat bed without using bedrails? 2  -DM 2  -KH    Moving from lying on back to sitting on the side of a flat bed without bedrails? 1  -DM 2  -KH    Moving to and from a bed to a chair (including a wheelchair)? 1  -DM 2  -KH    Standing up from a chair using your arms (e.g., wheelchair, bedside chair)? 1  -DM 1  -KH    Climbing 3-5 steps with a railing? 1  -DM 1  -KH    To walk in hospital room? 1  -DM 1  -KH    AM-PAC 6 Clicks Score (PT) 7  -DM 9  -KH    Highest level of mobility 2 --> Bed activities/dependent transfer  -DM 3 --> Sat at edge of bed  -KH    Row Name 07/06/22 1311          Functional Assessment    Outcome Measure Options AM-PAC 6 Clicks Basic Mobility (PT)  -DM           User Key  (r) = Recorded By, (t) = Taken By, (c) = Cosigned By    Initials Name Provider Type    Malena Valdez, PT Physical Therapist    Mckayla López, RN Registered Nurse                             Physical Therapy Education                 Title: PT OT SLP Therapies (In Progress)     Topic: Physical Therapy (In Progress)     Point: Mobility training (In Progress)     Learning Progress Summary           Patient Acceptance, E,D, NR by DM at 7/6/2022 1444    Acceptance, E, NR by LM at 7/4/2022 0954   Family Acceptance, E,D, NR by DM at 7/6/2022 1444                   Point: Home exercise program (In Progress)     Learning Progress Summary           Patient Acceptance, E,D, NR by DM at 7/6/2022 1444   Family Acceptance, E,D, NR by DM at 7/6/2022 1444                   Point: Body mechanics (In Progress)      Learning Progress Summary           Patient Acceptance, E,D, NR by DM at 7/6/2022 1444   Family Acceptance, E,D, NR by DM at 7/6/2022 1444                   Point: Precautions (In Progress)     Learning Progress Summary           Patient Acceptance, E,D, NR by DM at 7/6/2022 1444    Acceptance, E, NR by LM at 7/4/2022 0954   Family Acceptance, E,D, NR by DM at 7/6/2022 1444                               User Key     Initials Effective Dates Name Provider Type Discipline    DM 06/16/21 -  Malena Wilson, PT Physical Therapist PT    LM 06/16/21 -  Christin Aly, PT Physical Therapist PT              PT Recommendation and Plan     Plan of Care Reviewed With: patient, daughter  Progress: no change  Outcome Evaluation: Rolled L/R w/ max A (able to grasp & hold bedrail) for ther exer sidely., & scooted to HOB x 2 w/ draw sheet (Dep.1 to 2A). Performed ther exer in sup & sidely, & wash cloth rolls placed in hands to relieve anxiousness(clutching blanket w/ vise ). Def. EOB or STS d/t decr cognit status after med to calm s/p agit/pulling IV out x 2. Noted HR 92 & desat 94%.     Time Calculation:    PT Charges     Row Name 07/06/22 1445             Time Calculation    Start Time 1311  -DM      PT Received On 07/06/22  -DM      PT Goal Re-Cert Due Date 07/14/22  -DM              Time Calculation- PT    Total Timed Code Minutes- PT 36 minute(s)  -DM              Timed Charges    62946 - PT Therapeutic Exercise Minutes 21  -DM      19182 - PT Therapeutic Activity Minutes 15  -DM              Total Minutes    Timed Charges Total Minutes 36  -DM       Total Minutes 36  -DM            User Key  (r) = Recorded By, (t) = Taken By, (c) = Cosigned By    Initials Name Provider Type    DM Malena Wilson, PT Physical Therapist              Therapy Charges for Today     Code Description Service Date Service Provider Modifiers Qty    91114189388  PT THER PROC EA 15 MIN 7/6/2022 Malena Wilson, PT GP 1    [id number] HC PT  THERAPEUTIC ACT EA 15 MIN 7/6/2022 Malena Wilson, PT GP 1          PT G-Codes  Outcome Measure Options: AM-PAC 6 Clicks Basic Mobility (PT)  AM-PAC 6 Clicks Score (PT): 7  AM-PAC 6 Clicks Score (OT): 14    Malena Wilson, PT  7/6/2022

## 2022-07-06 NOTE — PROGRESS NOTES
[facility] Medicine Services  PROGRESS NOTE    Patient Name: Cooper Covarrubias  : 1931  MRN: [id number]    Date of Admission: 7/3/2022  Primary Care Physician: Lb Nielsen MD    Subjective   Subjective     CC:  hypoxia    HPI:  Continues to be confused per nursing.  [facility].  Hospice to follow-up and discuss placement options with family    ROS:  Unable to assess secondary to mental status, denies any discomfort        Objective   Objective     Vital Signs:   Temp:  [97.9 °F (36.6 °C)-98.2 °F (36.8 °C)] 98.2 °F (36.8 °C)  Heart Rate:  [] 67  Resp:  [16-22] 22  BP: (109-142)/(53-81) 142/81  Flow (L/min):  [2] 2     Physical Exam:  Constitutional: No acute distress, awake, alert  Respiratory: Faint bibasilar crackles but no wheezing today, respiratory effort normal   Cardiovascular: Irregular, no murmurs, rubs, or gallops  Gastrointestinal: Positive bowel sounds, soft, nontender, nondistended  Musculoskeletal: +1 pedal  ankle edema  Psychiatric: slightly agitated affect, cooperative  Neurologic: AaO x0, strength symmetric in all extremities,no other focal deficits        Results Reviewed:  LAB RESULTS:      Lab 22  0722  0635 22  2249   WBC 7.64 8.69 9.76 8.74   HEMOGLOBIN 8.7* 10.0* 9.7* 10.1*   HEMATOCRIT 27.6* 31.3* 32.4* 33.1*   PLATELETS 179 206 178 192   NEUTROS ABS  --   --   --  7.25*   EOS ABS  --   --   --  0.00   MCV 82.1 78.4* 83.1 82.3   CRP  --   --   --  20.21*   PROCALCITONIN  --   --   --  0.53*   LACTATE  --   --   --  1.0         Lab 22  0712 22  0636 22  04322  2249   SODIUM 144 143 142 145   POTASSIUM 4.0 4.2 4.0 3.9   CHLORIDE 111* 108* 106 107   CO2 25.0 26.0 26.0 29.0   ANION GAP 8.0 9.0 10.0 9.0   BUN 54* 42* 41* 41*   CREATININE 1.49* 1.19 1.42* 1.44*   EGFR 44.3* 58.0* 46.9* 46.2*    GLUCOSE 155* 157* 126* 115*   CALCIUM 9.0 9.2 9.1 9.0   MAGNESIUM  --   --   --  2.0   PHOSPHORUS  --   --   --  3.0         Lab 07/04/22  0439 07/03/22  2249   TOTAL PROTEIN 6.0 6.0   ALBUMIN 2.30* 2.50*   GLOBULIN 3.7 3.5   ALT (SGPT) 12 11   AST (SGOT) 13 14   BILIRUBIN 0.4 0.5   ALK PHOS 139* 139*   LIPASE  --  21         Lab 07/04/22  0439 07/04/22  0215 07/03/22  2249   PROBNP  --   --  716.0   TROPONIN T 0.028 0.021 0.035*             Lab 07/06/22  0712   IRON 78   IRON SATURATION 40   TIBC 197*   TRANSFERRIN 132*         Lab 07/03/22  2307   PH, ARTERIAL 7.450   PCO2, ARTERIAL 45.4*   PO2 ART 81.9*   FIO2 32   HCO3 ART 31.5*   BASE EXCESS ART 6.7*   CARBOXYHEMOGLOBIN 0.6     Brief Urine Lab Results  (Last result in the past 365 days)      Color   Clarity   Blood   Leuk Est   Nitrite   Protein   CREAT   Urine HCG        07/04/22 0557 Yellow   Turbid   Large (3+)   Moderate (2+)   Negative   100 mg/dL (2+)                 Microbiology Results Abnormal     Procedure Component Value - Date/Time    Blood Culture - Blood, Arm, Right [[id number]]  (Normal) Collected: 07/04/22 0045    Lab Status: Preliminary result Specimen: Blood from Arm, Right Updated: 07/06/22 0246     Blood Culture No growth at 2 days    Blood Culture - Blood, Arm, Left [[id number]]  (Normal) Collected: 07/04/22 0050    Lab Status: Preliminary result Specimen: Blood from Arm, Left Updated: 07/06/22 0245     Blood Culture No growth at 2 days    Urine Culture - Urine, Urine, Clean Catch [[id number]]  (Normal) Collected: 07/04/22 0557    Lab Status: Final result Specimen: Urine, Clean Catch Updated: 07/05/22 0944     Urine Culture No growth    Legionella Antigen, Urine - Urine, Urine, Clean Catch [[id number]]  (Normal) Collected: 07/04/22 0557    Lab Status: Final result Specimen: Urine, Clean Catch Updated: 07/04/22 1255     LEGIONELLA ANTIGEN, URINE Negative    MRSA Screen, PCR (Inpatient) - Swab, Nares [[id number]]  (Normal) Collected: 07/04/22  0445    Lab Status: Final result Specimen: Swab from Nares Updated: 07/04/22 1108     MRSA PCR Negative    Narrative:      The negative predictive value of this diagnostic test is high and should only be used to consider de-escalating anti-MRSA therapy. A positive result may indicate colonization with MRSA and must be correlated clinically.  MRSA Negative    COVID PRE-OP / PRE-PROCEDURE SCREENING ORDER (NO ISOLATION) - Swab, Nasopharynx [[id number]]  (Normal) Collected: 07/04/22 0445    Lab Status: Final result Specimen: Swab from Nasopharynx Updated: 07/04/22 0550    Narrative:      The following orders were created for panel order COVID PRE-OP / PRE-PROCEDURE SCREENING ORDER (NO ISOLATION) - Swab, Nasopharynx.  Procedure                               Abnormality         Status                     ---------                               -----------         ------                     Respiratory Panel PCR w/...[[id number]]  Normal              Final result                 Please view results for these tests on the individual orders.    Respiratory Panel PCR w/COVID-19(SARS-CoV-2) TERESA/EMI/RUSTY/PAD/COR/MAD/KATE In-House, NP Swab in UTM/VTM, 3-4 HR TAT - Swab, Nasopharynx [[id number]]  (Normal) Collected: 07/04/22 0445    Lab Status: Final result Specimen: Swab from Nasopharynx Updated: 07/04/22 0550     ADENOVIRUS, PCR Not Detected     Coronavirus 229E Not Detected     Coronavirus HKU1 Not Detected     Coronavirus NL63 Not Detected     Coronavirus OC43 Not Detected     COVID19 Not Detected     Human Metapneumovirus Not Detected     Human Rhinovirus/Enterovirus Not Detected     Influenza A PCR Not Detected     Influenza B PCR Not Detected     Parainfluenza Virus 1 Not Detected     Parainfluenza Virus 2 Not Detected     Parainfluenza Virus 3 Not Detected     Parainfluenza Virus 4 Not Detected     RSV, PCR Not Detected     Bordetella pertussis pcr Not Detected     Bordetella parapertussis PCR Not Detected     Chlamydophila  pneumoniae PCR Not Detected     Mycoplasma pneumo by PCR Not Detected    Narrative:      In the setting of a positive respiratory panel with a viral infection PLUS a negative procalcitonin without other underlying concern for bacterial infection, consider observing off antibiotics or discontinuation of antibiotics and continue supportive care. If the respiratory panel is positive for atypical bacterial infection (Bordetella pertussis, Chlamydophila pneumoniae, or Mycoplasma pneumoniae), consider antibiotic de-escalation to target atypical bacterial infection.          XR Chest 1 View    Result Date: 7/5/2022  DATE OF EXAM: 7/5/2022 4:40 PM  PROCEDURE: XR CHEST 1 VW-  INDICATIONS: crackles lower extremity edema; J96.21-Acute and chronic respiratory failure with hypoxia; N17.9-Acute kidney failure, unspecified; R77.8-Other specified abnormalities of plasma proteins; R13.10-Dysphagia, unspecified  COMPARISON: 7/3/2022  TECHNIQUE: Single radiographic view of the chest was obtained.  FINDINGS: Increasing left base consolidation and left effusion. There is also mild right base opacity. Cardiac and mediastinal contours are within normal limits. No evidence of pneumothorax.      Impression:  1. Developing left base consolidation and possibly small effusion concerning for pneumonia 2. Mild heterogeneous right base opacity may represent multifocal pneumonia.  This report was finalized on 7/5/2022 4:48 PM by Yoan Kelley MD.            I have reviewed the medications:  Scheduled Meds:aspirin, 324 mg, Oral, Once  cefepime, 2 g, Intravenous, Q12H  doxycycline, 100 mg, Intravenous, Q12H  heparin (porcine), 5,000 Units, Subcutaneous, Q12H  hydrALAZINE, 25 mg, Oral, Q12H  ipratropium-albuterol, 3 mL, Nebulization, Q4H - RT  levothyroxine, 137 mcg, Oral, Q AM  melatonin, 5 mg, Oral, Nightly  methylPREDNISolone sodium succinate, 40 mg, Intravenous, Daily  pharmacy consult - MTM, , Does not apply, Daily  sodium chloride, 10 mL,  Intravenous, Q12H  tamsulosin, 0.4 mg, Oral, Daily      Continuous Infusions:   PRN Meds:.•  acetaminophen  •  ipratropium-albuterol  •  ondansetron  •  QUEtiapine  •  sodium chloride  •  sodium chloride  •  traMADol    Assessment & Plan   Assessment & Plan     Active Hospital Problems    Diagnosis  POA   • **Acute on chronic respiratory failure (HCC) [J96.20]  Yes   • Severe malnutrition ([facility]) [E43]  Yes   • Elevated troponin [R77.8]  Yes   • OKSANA (acute kidney injury) ([facility]) [N17.9]  Yes   • Sepsis (HCC) [A41.9]  Yes   • COPD exacerbation ([facility]) [J44.1]  Yes   • Anemia [D64.9]  Yes   • Essential hypertension [I10]  Yes   • Anxiety and depression [F41.9, F32.A]  Yes   • BPH with urinary obstruction [N40.1, N13.8]  Yes   • Acute exacerbation of COPD with asthma ([facility]) [J44.1, J45.901]  Yes      Resolved Hospital Problems   No resolved problems to display.        Brief Hospital Course to date:  Cooper Covarrubias is a 89 y.o. male  who is admitted w acute on chronic respiratory failure AECOPD (solumedrol, nebs), ?PNA receiving broad spectrum antibiotics. He also has 15% bands and hypoxia concerning for sepsis.       Acute on Chronic Respiratory failure:  -on broad spectrum antibiotics for possible PNA  -on steroids and Nebs for COPD  -currently 94% on 2 liters    UTI  -UCx no growth as yet. On Cefepime. Recent Klebsiella.    Elevated Troponin  -Minimal elevation thought secondary to acute medical conditions    OKSANA  -Creatinine improved to near baseline today 1.4       COPD Exacerbation  -on Steroids and nebs.  - steroids decreased today to help with agitation    Anemia  -hemoglobin stable is microcytic iron panel from recent admission shows iron deficiency anemia        Essential HTN  -Hydralazine  -currently normotensive      BPH with Urinary obstruction  -Chronic chandler and Flomax  -Chandler was replaced    Expected Discharge Location and Transportation: TBD, family wants placement with hospice  Expected Discharge Date:  7/9/2022    DVT prophylaxis:  Medical DVT prophylaxis orders are present.     AM-PAC 6 Clicks Score (PT): 7 (07/06/22 1311)    CODE STATUS:   Code Status and Medical Interventions:   Ordered at: 07/04/22 0215     Medical Intervention Limits:    NO intubation (DNI)     Level Of Support Discussed With:    Patient     Code Status (Patient has no pulse and is not breathing):    No CPR (Do Not Attempt to Resuscitate)     Medical Interventions (Patient has pulse or is breathing):    Limited Support       Gemini Swan MD  07/06/22

## 2022-07-07 PROBLEM — G93.41 ENCEPHALOPATHY, METABOLIC: Status: ACTIVE | Noted: 2022-01-01

## 2022-07-07 NOTE — PLAN OF CARE
Goal Outcome Evaluation:  Plan of Care Reviewed With: (P) patient        Progress: (P) declining   Responds to stimulation. Tremors in sleep. Meds given for comfort and agitation. No family at bedside at this time. Moody in place, decreased UOP. Awaiting further plans.

## 2022-07-07 NOTE — PROGRESS NOTES
[facility] Medicine Services  PROGRESS NOTE    Patient Name: Cooper Covarrubias  : 1931  MRN: [id number]    Date of Admission: 7/3/2022  Primary Care Physician: Lb Nielsen MD    Subjective   Subjective     CC:  hypoxia    HPI:  Continues to be agitated    ROS:  Unable to assess secondary to mental status, denies any pain or discomfort        Objective   Objective     Vital Signs:   Temp:  [97.1 °F (36.2 °C)-98.2 °F (36.8 °C)] 97.1 °F (36.2 °C)  Heart Rate:  [] 107  Resp:  [16-22] 18  BP: (130-147)/(72-87) 130/74     Physical Exam:  Constitutional: No acute distress, awake, alert  Respiratory: Faint bibasilar crackles but no wheezing today, respiratory effort normal   Cardiovascular: Irregular, no murmurs, rubs, or gallops  Gastrointestinal: Positive bowel sounds, soft, nontender, nondistended  Musculoskeletal: +1 pedal  ankle edema  Psychiatric: slightly agitated affect, cooperative  Neurologic: Oriented to person and time only, strength symmetric in all extremities,no other focal deficits        Results Reviewed:  LAB RESULTS:      Lab 22  0712 22  0635 22  04322  2249   WBC 7.64 8.69 9.76 8.74   HEMOGLOBIN 8.7* 10.0* 9.7* 10.1*   HEMATOCRIT 27.6* 31.3* 32.4* 33.1*   PLATELETS 179 206 178 192   NEUTROS ABS  --   --   --  7.25*   EOS ABS  --   --   --  0.00   MCV 82.1 78.4* 83.1 82.3   CRP  --   --   --  20.21*   PROCALCITONIN  --   --   --  0.53*   LACTATE  --   --   --  1.0         Lab 22  0712 22  0636 22  04322  2249   SODIUM 144 143 142 145   POTASSIUM 4.0 4.2 4.0 3.9   CHLORIDE 111* 108* 106 107   CO2 25.0 26.0 26.0 29.0   ANION GAP 8.0 9.0 10.0 9.0   BUN 54* 42* 41* 41*   CREATININE 1.49* 1.19 1.42* 1.44*   EGFR 44.3* 58.0* 46.9* 46.2*   GLUCOSE 155* 157* 126* 115*   CALCIUM 9.0 9.2 9.1 9.0   MAGNESIUM  --   --   --  2.0   PHOSPHORUS  --   --   --  3.0         Lab 22  0439 22  7950   TOTAL PROTEIN 6.0 6.0    ALBUMIN 2.30* 2.50*   GLOBULIN 3.7 3.5   ALT (SGPT) 12 11   AST (SGOT) 13 14   BILIRUBIN 0.4 0.5   ALK PHOS 139* 139*   LIPASE  --  21         Lab 07/04/22  0439 07/04/22  0215 07/03/22  2249   PROBNP  --   --  716.0   TROPONIN T 0.028 0.021 0.035*             Lab 07/06/22  0712   IRON 78   IRON SATURATION 40   TIBC 197*   TRANSFERRIN 132*         Lab 07/03/22  2307   PH, ARTERIAL 7.450   PCO2, ARTERIAL 45.4*   PO2 ART 81.9*   FIO2 32   HCO3 ART 31.5*   BASE EXCESS ART 6.7*   CARBOXYHEMOGLOBIN 0.6     Brief Urine Lab Results  (Last result in the past 365 days)      Color   Clarity   Blood   Leuk Est   Nitrite   Protein   CREAT   Urine HCG        07/04/22 0557 Yellow   Turbid   Large (3+)   Moderate (2+)   Negative   100 mg/dL (2+)                 Microbiology Results Abnormal     Procedure Component Value - Date/Time    Blood Culture - Blood, Arm, Right [[id number]]  (Normal) Collected: 07/04/22 0045    Lab Status: Preliminary result Specimen: Blood from Arm, Right Updated: 07/07/22 0247     Blood Culture No growth at 3 days    Blood Culture - Blood, Arm, Left [[id number]]  (Normal) Collected: 07/04/22 0050    Lab Status: Preliminary result Specimen: Blood from Arm, Left Updated: 07/07/22 0247     Blood Culture No growth at 3 days    Urine Culture - Urine, Urine, Clean Catch [[id number]]  (Normal) Collected: 07/04/22 0557    Lab Status: Final result Specimen: Urine, Clean Catch Updated: 07/05/22 0944     Urine Culture No growth    Legionella Antigen, Urine - Urine, Urine, Clean Catch [[id number]]  (Normal) Collected: 07/04/22 0557    Lab Status: Final result Specimen: Urine, Clean Catch Updated: 07/04/22 1255     LEGIONELLA ANTIGEN, URINE Negative    MRSA Screen, PCR (Inpatient) - Swab, Nares [[id number]]  (Normal) Collected: 07/04/22 0445    Lab Status: Final result Specimen: Swab from Nares Updated: 07/04/22 1108     MRSA PCR Negative    Narrative:      The negative predictive value of this diagnostic test is high  and should only be used to consider de-escalating anti-MRSA therapy. A positive result may indicate colonization with MRSA and must be correlated clinically.  MRSA Negative    COVID PRE-OP / PRE-PROCEDURE SCREENING ORDER (NO ISOLATION) - Swab, Nasopharynx [[id number]]  (Normal) Collected: 07/04/22 0445    Lab Status: Final result Specimen: Swab from Nasopharynx Updated: 07/04/22 0550    Narrative:      The following orders were created for panel order COVID PRE-OP / PRE-PROCEDURE SCREENING ORDER (NO ISOLATION) - Swab, Nasopharynx.  Procedure                               Abnormality         Status                     ---------                               -----------         ------                     Respiratory Panel PCR w/...[[id number]]  Normal              Final result                 Please view results for these tests on the individual orders.    Respiratory Panel PCR w/COVID-19(SARS-CoV-2) TERESA/EMI/RUSTY/PAD/COR/MAD/KATE In-House, NP Swab in UTM/VTM, 3-4 HR TAT - Swab, Nasopharynx [[id number]]  (Normal) Collected: 07/04/22 0445    Lab Status: Final result Specimen: Swab from Nasopharynx Updated: 07/04/22 0550     ADENOVIRUS, PCR Not Detected     Coronavirus 229E Not Detected     Coronavirus HKU1 Not Detected     Coronavirus NL63 Not Detected     Coronavirus OC43 Not Detected     COVID19 Not Detected     Human Metapneumovirus Not Detected     Human Rhinovirus/Enterovirus Not Detected     Influenza A PCR Not Detected     Influenza B PCR Not Detected     Parainfluenza Virus 1 Not Detected     Parainfluenza Virus 2 Not Detected     Parainfluenza Virus 3 Not Detected     Parainfluenza Virus 4 Not Detected     RSV, PCR Not Detected     Bordetella pertussis pcr Not Detected     Bordetella parapertussis PCR Not Detected     Chlamydophila pneumoniae PCR Not Detected     Mycoplasma pneumo by PCR Not Detected    Narrative:      In the setting of a positive respiratory panel with a viral infection PLUS a negative  procalcitonin without other underlying concern for bacterial infection, consider observing off antibiotics or discontinuation of antibiotics and continue supportive care. If the respiratory panel is positive for atypical bacterial infection (Bordetella pertussis, Chlamydophila pneumoniae, or Mycoplasma pneumoniae), consider antibiotic de-escalation to target atypical bacterial infection.          XR Chest 1 View    Result Date: 7/5/2022  DATE OF EXAM: 7/5/2022 4:40 PM  PROCEDURE: XR CHEST 1 VW-  INDICATIONS: crackles lower extremity edema; J96.21-Acute and chronic respiratory failure with hypoxia; N17.9-Acute kidney failure, unspecified; R77.8-Other specified abnormalities of plasma proteins; R13.10-Dysphagia, unspecified  COMPARISON: 7/3/2022  TECHNIQUE: Single radiographic view of the chest was obtained.  FINDINGS: Increasing left base consolidation and left effusion. There is also mild right base opacity. Cardiac and mediastinal contours are within normal limits. No evidence of pneumothorax.      Impression:  1. Developing left base consolidation and possibly small effusion concerning for pneumonia 2. Mild heterogeneous right base opacity may represent multifocal pneumonia.  This report was finalized on 7/5/2022 4:48 PM by Yoan Kelley MD.            I have reviewed the medications:  Scheduled Meds:aspirin, 324 mg, Oral, Once  cefepime, 2 g, Intravenous, Q12H  doxycycline, 100 mg, Intravenous, Q12H  heparin (porcine), 5,000 Units, Subcutaneous, Q12H  hydrALAZINE, 25 mg, Oral, Q12H  ipratropium-albuterol, 3 mL, Nebulization, Q4H - RT  levothyroxine, 137 mcg, Oral, Q AM  melatonin, 5 mg, Oral, Nightly  methylPREDNISolone sodium succinate, 40 mg, Intravenous, Daily  pharmacy consult - MTM, , Does not apply, Daily  senna-docusate sodium, 2 tablet, Oral, BID  sodium chloride, 10 mL, Intravenous, Q12H  tamsulosin, 0.4 mg, Oral, Daily      Continuous Infusions:   PRN Meds:.•  acetaminophen  •  senna-docusate sodium  **AND** polyethylene glycol **AND** bisacodyl **AND** bisacodyl  •  ipratropium-albuterol  •  ondansetron  •  QUEtiapine  •  sodium chloride  •  sodium chloride  •  traMADol    Assessment & Plan   Assessment & Plan     Active Hospital Problems    Diagnosis  POA   • **Acute on chronic respiratory failure (HCC) [J96.20]  Yes   • Acute on chronic respiratory failure with hypoxia (HCC) [J96.21]  Yes   • Severe malnutrition (HCC) [E43]  Yes   • Elevated troponin [R77.8]  Yes   • OKSANA (acute kidney injury) (HCC) [N17.9]  Yes   • Sepsis (HCC) [A41.9]  Yes   • COPD exacerbation (HCC) [J44.1]  Yes   • Anemia [D64.9]  Yes   • Essential hypertension [I10]  Yes   • Anxiety and depression [F41.9, F32.A]  Yes   • BPH with urinary obstruction [N40.1, N13.8]  Yes   • 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        Brief Hospital Course to date:  Cooper Covarrubias is a 89 y.o. male  who is admitted w acute on chronic respiratory failure AECOPD (solumedrol, nebs), ?PNA receiving broad spectrum antibiotics. He also has 15% bands and hypoxia concerning for sepsis.       Acute on Chronic Respiratory failure:  -on broad spectrum antibiotics for possible PNA  -on steroids and Nebs for COPD.  Steroids DC'd today as he has new wheezing and has been having increasing agitation  -currently 94% on 2 liters    UTI  -UCx no growth as yet.  cefepime. Recent Klebsiella.    Elevated Troponin  -Minimal elevation thought secondary to acute medical conditions    OKSANA  -Creatinine improved to near baseline today 1.4       COPD Exacerbation  -on Steroids and nebs.  - steroids DC'd today  - on abx    Anemia  -hemoglobin stable is microcytic iron panel from recent admission shows iron deficiency anemia  -Can start iron supplementation        Essential HTN  -Hydralazine  -currently normotensive      BPH with Urinary obstruction  -Chronic chandler and Flomax  -Chandler was  replaced    Expected Discharge Location and Transportation: TBD, family wants placement with hospice  Expected Discharge Date: TBD    DVT prophylaxis:  Medical DVT prophylaxis orders are present.     AM-PAC 6 Clicks Score (PT): 7 (07/06/22 2000)    CODE STATUS:   Code Status and Medical Interventions:   Ordered at: 07/04/22 0215     Medical Intervention Limits:    NO intubation (DNI)     Level Of Support Discussed With:    Patient     Code Status (Patient has no pulse and is not breathing):    No CPR (Do Not Attempt to Resuscitate)     Medical Interventions (Patient has pulse or is breathing):    Limited Support       Gemini Swan MD  07/07/22

## 2022-07-07 NOTE — PLAN OF CARE
Problem: Fall Injury Risk  Goal: Absence of Fall and Fall-Related Injury  Intervention: Identify and Manage Contributors  Recent Flowsheet Documentation  Taken 7/7/2022 0200 by Cyndi Montalvo RN  Medication Review/Management: medications reviewed  Taken 7/7/2022 0000 by Cyndi Mnotalvo RN  Medication Review/Management: medications reviewed  Taken 7/6/2022 2200 by Cyndi Montalvo RN  Medication Review/Management: medications reviewed  Taken 7/6/2022 2000 by Cyndi Montalvo RN  Medication Review/Management: medications reviewed  Intervention: Promote Injury-Free Environment  Recent Flowsheet Documentation  Taken 7/7/2022 0200 by Cyndi Montalvo RN  Safety Promotion/Fall Prevention:   activity supervised   assistive device/personal items within reach   clutter free environment maintained  Taken 7/7/2022 0000 by Cyndi Montalvo RN  Safety Promotion/Fall Prevention:   activity supervised   assistive device/personal items within reach   clutter free environment maintained  Taken 7/6/2022 2200 by Cyndi Montalvo RN  Safety Promotion/Fall Prevention:   activity supervised   assistive device/personal items within reach   clutter free environment maintained  Taken 7/6/2022 2000 by Cyndi Montalvo RN  Safety Promotion/Fall Prevention:   activity supervised   assistive device/personal items within reach   clutter free environment maintained     Problem: Adult Inpatient Plan of Care  Goal: Absence of Hospital-Acquired Illness or Injury  Intervention: Identify and Manage Fall Risk  Recent Flowsheet Documentation  Taken 7/7/2022 0200 by Cyndi Montalvo RN  Safety Promotion/Fall Prevention:   activity supervised   assistive device/personal items within reach   clutter free environment maintained  Taken 7/7/2022 0000 by Cyndi Montalvo RN  Safety Promotion/Fall Prevention:   activity supervised   assistive device/personal items within reach   clutter free environment maintained  Taken 7/6/2022 2200 by Cyndi Montalvo RN  Safety  Promotion/Fall Prevention:   activity supervised   assistive device/personal items within reach   clutter free environment maintained  Taken 7/6/2022 2000 by Cyndi Montalvo RN  Safety Promotion/Fall Prevention:   activity supervised   assistive device/personal items within reach   clutter free environment maintained  Intervention: Prevent Skin Injury  Recent Flowsheet Documentation  Taken 7/7/2022 0200 by Cyndi Montalvo RN  Skin Protection:   adhesive use limited   tubing/devices free from skin contact   skin-to-device areas padded   incontinence pads utilized  Taken 7/7/2022 0000 by Cyndi Montalvo RN  Skin Protection:   adhesive use limited   tubing/devices free from skin contact   skin-to-device areas padded   incontinence pads utilized  Taken 7/6/2022 2200 by Cyndi Montalvo RN  Skin Protection:   adhesive use limited   skin-to-device areas padded   incontinence pads utilized  Taken 7/6/2022 2000 by Cyndi Montalvo RN  Skin Protection:   adhesive use limited   tubing/devices free from skin contact   skin-to-device areas padded   incontinence pads utilized  Intervention: Prevent and Manage VTE (Venous Thromboembolism) Risk  Recent Flowsheet Documentation  Taken 7/7/2022 0200 by Cyndi Montalvo RN  Activity Management: activity adjusted per tolerance  Taken 7/7/2022 0000 by Cyndi Montalvo RN  Activity Management: activity adjusted per tolerance  Taken 7/6/2022 2200 by Cyndi Montalvo RN  Activity Management: activity adjusted per tolerance  Taken 7/6/2022 2000 by Cyndi Montalvo RN  Activity Management: activity adjusted per tolerance  Intervention: Prevent Infection  Recent Flowsheet Documentation  Taken 7/7/2022 0200 by Cyndi Montalvo RN  Infection Prevention: rest/sleep promoted  Taken 7/7/2022 0000 by Cyndi Montalvo RN  Infection Prevention: rest/sleep promoted  Taken 7/6/2022 2200 by Cyndi Montalvo RN  Infection Prevention: rest/sleep promoted  Taken 7/6/2022 2000 by Cyndi Montalvo RN  Infection  Prevention: rest/sleep promoted     Problem: COPD (Chronic Obstructive Pulmonary Disease) Comorbidity  Goal: Maintenance of COPD Symptom Control  Intervention: Maintain COPD-Symptom Control  Recent Flowsheet Documentation  Taken 7/7/2022 0200 by Cyndi Montalvo RN  Medication Review/Management: medications reviewed  Taken 7/7/2022 0000 by Cyndi Montalvo RN  Medication Review/Management: medications reviewed  Taken 7/6/2022 2200 by Cyndi Montalvo RN  Medication Review/Management: medications reviewed  Taken 7/6/2022 2000 by Cyndi Montalvo RN  Medication Review/Management: medications reviewed     Problem: Hypertension Comorbidity  Goal: Blood Pressure in Desired Range  Intervention: Maintain Blood Pressure Management  Recent Flowsheet Documentation  Taken 7/7/2022 0200 by Cyndi Montalvo RN  Medication Review/Management: medications reviewed  Taken 7/7/2022 0000 by Cyndi Montalvo RN  Medication Review/Management: medications reviewed  Taken 7/6/2022 2200 by Cyndi Montalvo RN  Medication Review/Management: medications reviewed  Taken 7/6/2022 2000 by Cyndi Montalvo RN  Medication Review/Management: medications reviewed     Problem: Skin Injury Risk Increased  Goal: Skin Health and Integrity  Intervention: Optimize Skin Protection  Recent Flowsheet Documentation  Taken 7/7/2022 0200 by Cyndi Montalvo RN  Pressure Reduction Techniques: frequent weight shift encouraged  Pressure Reduction Devices:   specialty bed utilized   pressure-redistributing mattress utilized   positioning supports utilized   heel offloading device utilized  Skin Protection:   adhesive use limited   tubing/devices free from skin contact   skin-to-device areas padded   incontinence pads utilized  Taken 7/7/2022 0000 by Cyndi Montalvo RN  Pressure Reduction Techniques: frequent weight shift encouraged  Pressure Reduction Devices:   specialty bed utilized   pressure-redistributing mattress utilized   positioning supports utilized  Skin  Protection:   adhesive use limited   tubing/devices free from skin contact   skin-to-device areas padded   incontinence pads utilized  Taken 7/6/2022 2200 by Cyndi Montalvo RN  Pressure Reduction Techniques: frequent weight shift encouraged  Pressure Reduction Devices:   specialty bed utilized   pressure-redistributing mattress utilized   positioning supports utilized  Skin Protection:   adhesive use limited   skin-to-device areas padded   incontinence pads utilized  Taken 7/6/2022 2000 by Cyndi Montalvo RN  Pressure Reduction Techniques: frequent weight shift encouraged  Pressure Reduction Devices:   specialty bed utilized   pressure-redistributing mattress utilized   positioning supports utilized  Skin Protection:   adhesive use limited   tubing/devices free from skin contact   skin-to-device areas padded   incontinence pads utilized     Problem: Functional Ability Impaired COPD (Chronic Obstructive Pulmonary Disease)  Goal: Optimal Level of Functional [place]  Intervention: Optimize Functional Ability  Recent Flowsheet Documentation  Taken 7/7/2022 0200 by Cyndi Montalvo RN  Activity Management: activity adjusted per tolerance  Taken 7/7/2022 0000 by Cyndi Montalvo RN  Activity Management: activity adjusted per tolerance  Taken 7/6/2022 2200 by Cyndi Montalvo RN  Activity Management: activity adjusted per tolerance  Taken 7/6/2022 2000 by Cyndi Montalvo RN  Activity Management: activity adjusted per tolerance     Problem: Respiratory Compromise COPD (Chronic Obstructive Pulmonary Disease)  Goal: Effective Oxygenation and Ventilation  Intervention: Promote Airway Secretion Clearance  Recent Flowsheet Documentation  Taken 7/7/2022 0200 by Cyndi Montlavo RN  Activity Management: activity adjusted per tolerance  Taken 7/7/2022 0000 by Cyndi Montalvo RN  Activity Management: activity adjusted per tolerance  Taken 7/6/2022 2200 by Cyndi Montalvo RN  Activity Management: activity adjusted per  tolerance  Taken 7/6/2022 2000 by Cyndi Montalvo, RN  Activity Management: activity adjusted per tolerance   Goal Outcome Evaluation:      Agitated overnight, restless. Seroquel and melatonin given at beginning of shift, pt continued to pick at things including removing IV, gown, telemetry. One time dose of haldol given and pt rested better afterwards. ZOILA.

## 2022-07-07 NOTE — PROGRESS NOTES
Continued Stay Note  [facility]     Patient Name: Cooper Covarrubias  MRN: [id number]  Today's Date: 7/7/2022    Admit Date: 7/7/2022     Discharge Plan     Row Name 07/07/22 1429       Plan    Plan Inpatient hospice    Plan Comments Patient admitted to inpatient hospice.  Dr. Swan aware of discharge/readmit. Coordinated care with staff nurse, Mckayla.  For further assist, call ext [phone].               Discharge Codes    No documentation.                     Janet Harper RN

## 2022-07-07 NOTE — INTERVAL H&P NOTE
Mr. Covarrubias was admitted to [facility] on 7/7/2022. Please see Hospice documentation for further information.     Reviewed documentation and orders.  Agree with plan of care.

## 2022-07-07 NOTE — PROGRESS NOTES
NN spoke with daughters at .  They report the patient is being admitted to inpatient Hospice.  They had no questions or concerns at that time.  NN continues to follow.

## 2022-07-08 NOTE — H&P
Hospice History and Physical     Patient Name:  Cooper Covarrubias   : 1931   Sex: male    Patient Care Team:  Lb Nielsen MD as PCP - General  Lb Nielsen MD as PCP - Family Medicine  Ulysses Patrick MD as Consulting Physician (Dermatology)  Aislinn Castro MD as Referring Physician (Dermatology)  Ward Sales MD as Consulting Physician (Radiation Oncology)  Juan Viramontes MD as Consulting Physician (Dermatology)  Juan Carlos Heranndez MD as Consulting Physician (Nephrology)  Matt Sosa MD as Consulting Physician (Urology)    Code Status: Comfort Measures    Subjective     Per Hosptialist Discharge Summary:    Cooper Covarrubias is a 89 y.o. male who is admitted w acute on chronic respiratory failure AECOPD (solumedrol, nebs), ?PNA receiving broad spectrum antibiotics. He also had 15% bands and hypoxia concerning for sepsis. He was placed on broad spectrum antibiotics for possible PNA for his acute on chronic respiratory failure. He was also on steroids and nebulizers for COPD, steroids were discontinued secondary to increasing agitation. Receiving cefepime for UTI, urine culture did not show any growth yet but he did have a recent Klebsiella infection. He also had an OKSANA and creatinine was monitored. Given underlying medical conditions, dementia and worsening agitation palliative was consulted. Family interested in hospice and he was ultimately discharge to inpatient hospice.    [end of copied text]    Mr. Covarrubias was admitted to in Hospice on 2022 for mgmt of acute symptoms 2/2 metabolic encephalopathy. Not comfortable upon visit; eyes open; nonverbal; does not tolerate care/touch    PRNs:  Haldol 1mg x2  Dilaudid 0.5mg x2  Toradol 15mg x2  Versed 1mg x1      Review of Systems  Review of Systems   Unable to perform ROS: Acuity of condition       History  Past Medical History:   Diagnosis Date   • Anxiety    • Arthritis    • Basal cell carcinoma    • Cataract    • COPD (chronic  obstructive pulmonary disease) (HCC)    • Hypertension    • Urinary incontinence    • Visual impairment      Past Surgical History:   Procedure Laterality Date   • CATARACT EXTRACTION     • SKIN CANCER EXCISION      1986, 1999, 21004, [id number]     Current Facility-Administered Medications   Medication Dose Route Frequency Provider Last Rate Last Admin   • acetaminophen (TYLENOL) suppository 650 mg  650 mg Rectal Q4H PRN Cyndi Ash H, APRN       • bisacodyl (DULCOLAX) suppository 10 mg  10 mg Rectal Daily PRN Cyndi Ash, APRN       • bisacodyl (DULCOLAX) suppository 10 mg  10 mg Rectal Nightly Cyndi Ash, APRN   10 mg at 07/07/22 2017   • furosemide (LASIX) injection 20 mg  20 mg Intravenous Q6H PRN Cyndi Ash, APRN       • glycopyrrolate (ROBINUL) injection 0.2 mg  0.2 mg Intravenous Q6H PRN Cyndi Ash, APRN       • haloperidol lactate (HALDOL) injection 1 mg  1 mg Intravenous Q4H PRN Cyndi Ash, APRN   1 mg at 07/08/22 1310   • HYDROmorphone (DILAUDID) injection 0.25 mg  0.25 mg Intravenous Q6H Cyndi Ash, APRN   0.25 mg at 07/08/22 0845   • HYDROmorphone (DILAUDID) injection 0.5 mg  0.5 mg Intravenous Q2H PRN Cyndi Ash, APRN   0.5 mg at 07/08/22 1311   • ketorolac (TORADOL) injection 15 mg  15 mg Intravenous Q6H PRN Cyndi Ash, APRN   15 mg at 07/08/22 1310   • midazolam (VERSED) injection 1 mg  1 mg Intravenous Q4H PRN Cyndi Ash, APRN   1 mg at 07/08/22 0845   • ondansetron (ZOFRAN) injection 4 mg  4 mg Intravenous Q6H PRN Cyndi Ash H, APRN       • palliative care oral rinse   Mouth/Throat Q6H Cyndi Ash, APRN   Given at 07/08/22 0845   • scopolamine patch 1 mg/72 hr  1 patch Transdermal Q72H PRN Cyndi Ash, ADNNY            •  acetaminophen  •  bisacodyl  •  furosemide  •  glycopyrrolate  •  haloperidol lactate  •  HYDROmorphone  •  ketorolac  •  midazolam  •  ondansetron  •  Scopolamine  Allergies   Allergen  Reactions   • Latex Hives     Family History   Problem Relation Age of Onset   • No Known Problems Mother    • Cancer Father    • Cancer Brother      Social History     Socioeconomic History   • Marital status:    Tobacco Use   • Smoking status: Former Smoker     Packs/day: 2.00     Years: 30.00     Pack years: 60.00   • Smokeless tobacco: Never Used   Substance and Sexual Activity   • Alcohol use: No   • Drug use: No   • Sexual activity: Defer     Comment:         Objective     PPS: Palliative Performance Scale score as of 7/14/2022, 11:16 EDT is 10% based on the following measures:   Ambulation: Totally bed bound  Activity and Evidence of Disease: Unable to do any work, extensive evidence of disease  Self-Care: Total care  Intake:  Mouth care only  LOC: Drowsy or coma      Physical Exam:    General Appearance:    laying in bed; agitated/restless; not comfortable   HEENT:    furrowed brow; dry MM   Neck:   trachea midline, no JVD   Lungs:     CTA bilat, diminished in bases; respirations regular, even and unlabored; 4LNC    Heart:    RRR, normal S1 and S2, no M/R/G   Abdomen:     BSx4, soft, non-tender, non-distended   G/U:   FC draining clear yellow urine   MSK/Extremities:   Wasting, no edema   Pulses:   Pulses palpable and equal bilaterally   Skin:   Warm, dry   Neurologic:   does not follow commands; BUE tremor   Psych:  + facial grimacing; + moaning         Results Reviewed:  LAB RESULTS:      Lab 07/06/22  0712 07/05/22  0635 07/04/22 0439 07/03/22  2249   WBC 7.64 8.69 9.76 8.74   HEMOGLOBIN 8.7* 10.0* 9.7* 10.1*   HEMATOCRIT 27.6* 31.3* 32.4* 33.1*   PLATELETS 179 206 178 192   NEUTROS ABS  --   --   --  7.25*   EOS ABS  --   --   --  0.00   MCV 82.1 78.4* 83.1 82.3   CRP  --   --   --  20.21*   PROCALCITONIN  --   --   --  0.53*   LACTATE  --   --   --  1.0         Lab 07/07/22  0729 07/06/22  0712 07/05/22  0636 07/04/22  0439 07/03/22  2249   SODIUM 146* 144 143 142 145   POTASSIUM 4.1 4.0  4.2 4.0 3.9   CHLORIDE 113* 111* 108* 106 107   CO2 25.0 25.0 26.0 26.0 29.0   ANION GAP 8.0 8.0 9.0 10.0 9.0   BUN 57* 54* 42* 41* 41*   CREATININE 1.38* 1.49* 1.19 1.42* 1.44*   EGFR 48.6* 44.3* 58.0* 46.9* 46.2*   GLUCOSE 103* 155* 157* 126* 115*   CALCIUM 8.9 9.0 9.2 9.1 9.0   MAGNESIUM  --   --   --   --  2.0   PHOSPHORUS  --   --   --   --  3.0         Lab 07/04/22  0439 07/03/22  2249   TOTAL PROTEIN 6.0 6.0   ALBUMIN 2.30* 2.50*   GLOBULIN 3.7 3.5   ALT (SGPT) 12 11   AST (SGOT) 13 14   BILIRUBIN 0.4 0.5   ALK PHOS 139* 139*   LIPASE  --  21         Lab 07/04/22  0439 07/04/22  0215 07/03/22  2249   PROBNP  --   --  716.0   TROPONIN T 0.028 0.021 0.035*             Lab 07/06/22  0712   IRON 78   IRON SATURATION 40   TIBC 197*   TRANSFERRIN 132*         Lab 07/03/22  2307   PH, ARTERIAL 7.450   PCO2, ARTERIAL 45.4*   PO2 ART 81.9*   FIO2 32   HCO3 ART 31.5*   BASE EXCESS ART 6.7*   CARBOXYHEMOGLOBIN 0.6     Brief Urine Lab Results  (Last result in the past 365 days)      Color   Clarity   Blood   Leuk Est   Nitrite   Protein   CREAT   Urine HCG        07/04/22 0557 Yellow   Turbid   Large (3+)   Moderate (2+)   Negative   100 mg/dL (2+)                 Microbiology Results Abnormal     None            Encephalopathy, metabolic      Assessment & Plan   Assessment/Plan:     89yoM admitted to [facility] 7/7 for metabolic encephalopathy.    Agitation/restlessness  AMS  Anxiety  Dyspnea  Pain, nos  EOLC    *Not comfortable upon visit; eyes open; nonverbal; does not tolerate care/touch    Continue dul supp    Dilaudid 0.5mg q6h    Versed 1mg q6h    Moody    Transfer to [facility]    Palliative oral rinse scheduled and prn    Eye gtts scheduled and prn    Coordinated care with Nursing and Hospice IDT    Disposition: EOLC    Total Visit Time: 65min  Face to Face Time: 20min    Justification for care:  Patient meets criteria for acute in-patient care with required nursing assessment and interventions for symptoms with IV  medications.      Cyndi Ash, RITU, MHA, APRN  [facility] Care Navigators  Hospice and Palliative Care Nurse Practitioner  07/08/22  13:32 EDT

## 2022-07-08 NOTE — PLAN OF CARE
Patient has rested well.   No PRN medications required.   No apnea, no audible congestion.  Responds to painful stimuli.  Extremities warm.  Skin integrity maintained.  Moody with adequate output.  No oral intake.   Bath completed.  Bm this shift.   Dressing to coccyx CDI.

## 2022-07-08 NOTE — PROGRESS NOTES
Continued Stay Note  [facility]     Patient Name: Cooper Covarrubias  MRN: [id number]  Today's Date: 7/8/2022    Admit Date: 7/7/2022     Discharge Plan     Row Name 07/08/22 0849       Plan    Plan Inpatient hospice    Plan Comments Patient with relaxed face, jaw, body posture, respirations. Nonverbal indicators of comfort present. Rn reassures patient that he is safe, cared for. Patient continues to meet inpatient hospice criteria due to requirements of injectable medications requiring skilled nursing intervention.  Current level of care unable to be provided in alternate setting.    Final Discharge Disposition Code 51 - hospice medical facility               Discharge Codes    No documentation.                     Jeannette Greene RN

## 2022-07-08 NOTE — PROGRESS NOTES
NN rounded on patient at BS.  Patient appears relaxed and on room air.  No family at BS.  No questions or concerns at this time.  NN continues to follow.

## 2022-07-08 NOTE — PROGRESS NOTES
Nutrition Services    Patient Name:  Cooper Covarrubias  YOB: 1931  MRN: [id number]  Admit Date:  7/7/2022    Patient transferred to Hospice care, comfort measures only. .  Nutrition services will sign off at this time.  Please consult if nutrition services are needed.          Electronically signed by:  Ria Becerra RD  07/08/22 08:11 EDT

## 2022-07-09 NOTE — PROGRESS NOTES
Continued Stay Note   [facility]     Patient Name: Cooper Covarrubias  MRN: [id number]  Today's Date: 7/9/2022    Admit Date: 7/7/2022     Discharge Plan     Row Name 07/09/22 0906       Plan    Plan Inpatient hospice    Plan Comments Patient resting peacefully at time of visit.  Face, jaw, and body relaxed and breathing even and unlabored.  No family present at this time.               Discharge Codes    No documentation.                     Janet Harper RN

## 2022-07-09 NOTE — PROGRESS NOTES
Hospice Progress Note    Patient Name: Cooper Covarrubias   : 1931  Gender: male    Code Status: comfort measures    Date of Admission: 2022    Subjective:    89yoM admitted to in Hospice  for metabolic encephalopathy.    Comfortable overnight; change of condition throughout the day requiring multiple prns and medication changes for comfort. Family at bedside.     - PRNs:  Haldol 1mg x1  Dilaudid 0.5mg x1    - Intake/Output  Intake & Output (last 3 days)        07 07 07 07 07 07 0701  07/10 0700    P.O. 50 0 0 0    IV Piggyback 350       Total Intake(mL/kg) 400 (5.6) 0 (0) 0 (0) 0 (0)    Urine (mL/kg/hr) 850 (0.5) 825 (0.5) 1200 (0.7)     Stool  0      Total Output      Net -450 -825 -1200 0            Stool Unmeasured Occurrence  1 x               ROS:  Review of Systems   Unable to perform ROS: Acuity of condition       Reviewed current scheduled and prn medications for route, type, dose and frequency.     •  acetaminophen  •  bisacodyl  •  furosemide  •  glycopyrrolate  •  haloperidol lactate  •  HYDROmorphone  •  ketorolac  •  midazolam  •  ondansetron  •  polyvinyl alcohol  •  Scopolamine    Objective:     PPS: Palliative Performance Scale score as of 2022, 11:21 EDT is 10% based on the following measures:   Ambulation: Totally bed bound  Activity and Evidence of Disease: Unable to do any work, extensive evidence of disease  Self-Care: Total care  Intake:  Mouth care only  LOC: Drowsy or coma    Physical Exam:  General Appearance:    could be more comfortable; intolerant to care   HEENT:    furrowed brow; dry MM; does open eyes - not scared   Neck:   trachea midline, no JVD   Lungs:     CTA bilat, diminished in bases; respirations regular, even and unlabored; tachypneic with touch; 4LNC    Heart:    RRR, normal S1 and S2, no M/R/G   Abdomen:     BSx4, soft, non-tender, non-distended   G/U:   FC draining clear yellow urine    MSK/Extremities:   Wasting, no edema   Pulses:   Pulses palpable and equal bilaterally   Skin:   Warm, dry   Neurologic:   does not follow commands; BUE tremor   Psych:  + facial grimacing; + moaning         Encephalopathy, metabolic      Assessment/Plan:     89yoM admitted to in Hospice 7/7 for metabolic encephalopathy.     Agitation/restlessness  AMS  Anxiety  Dyspnea  Pain, nos  EOLC    Dilaudid 0.5mg q4h    Versed 1mg q4h    Palliative oral rinse scheduled and prn    Eye gtts scheduled and prn    Discontinue meal tray      UPDATE:    Increase scheduled dilaudid q4h to 1mg     Continue Versed 1mg q4h - times changed to be given with dilaudid    Continue with palliative oral rinse and eye gtts    Prns reviewed/adjusted for comfort      Coordinated care with Nursing and Hospice IDT    Discussion at bedside with loving family.    Discharge Disposition: EOLC    Total Visit Time: 45min  Face to Face Time: 20min    Justification for care:  Patient meets criteria for acute in-patient care with required nursing assessment and interventions for symptoms with IV medications.      Cyndi Ash, DNP, MHA, APRN  [facility] Care Navigators  Hospice and Palliative Care Nurse Practitioner  07/09/22  09:42 EDT

## 2022-07-09 NOTE — PLAN OF CARE
Problem: Adult Inpatient Plan of Care  Goal: Plan of Care Review  Flowsheets (Taken 7/9/2022 3198)  Progress: declining  Plan of Care Reviewed With: patient  Outcome Evaluation: Pt appears comfortable with scheduled meds.  Opens eyes to voice and pain.  Unable to follow commands.  Dulcolax suppository held d/t multiple bms. No prns given.

## 2022-07-09 NOTE — DISCHARGE SUMMARY
[facility] Medicine Services  DISCHARGE SUMMARY    Patient Name: Cooper Covarrubias  : 1931  MRN: [id number]    Date of Admission: 7/3/2022 10:10 PM  Date of Discharge:  2022  Primary Care Physician: Lb Nielsen MD    Consults     Date and Time Order Name Status Description    2022  2:55 AM Inpatient Palliative Care MD Consult Completed           Hospital Course     Presenting Problem:   Acute on chronic respiratory failure (HCC) [J96.20]  Acute on chronic respiratory failure with hypoxia (HCC) [J96.21]    Active Hospital Problems    Diagnosis  POA   • **Acute on chronic respiratory failure (HCC) [J96.20]  Yes   • Acute on chronic respiratory failure with hypoxia (HCC) [J96.21]  Yes   • Severe malnutrition (HCC) [E43]  Yes   • Elevated troponin [R77.8]  Yes   • OKSANA (acute kidney injury) (HCC) [N17.9]  Yes   • Sepsis (HCC) [A41.9]  Yes   • COPD exacerbation (HCC) [J44.1]  Yes   • Anemia [D64.9]  Yes   • Essential hypertension [I10]  Yes   • Anxiety and depression [F41.9, F32.A]  Yes   • BPH with urinary obstruction [N40.1, N13.8]  Yes   • 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          Hospital Course:  Cooper Covarrubias is a 89 y.o. male who is admitted w acute on chronic respiratory failure AECOPD (solumedrol, nebs), ?PNA receiving broad spectrum antibiotics. He also had 15% bands and hypoxia concerning for sepsis. He was placed on broad spectrum antibiotics for possible PNA for his acute on chronic respiratory failure. He was also on steroids and nebulizers for COPD, steroids were discontinued secondary to increasing agitation. Receiving cefepime for UTI, urine culture did not show any growth yet but he did have a recent Klebsiella infection. He also had an OKSANA and creatinine was monitored. Given underlying medical conditions, dementia and worsening agitation palliative was  consulted. Family interested in hospice and he was ultimately discharge to inpatient hospice        Acute on Chronic Respiratory failure     UTI  -UCx no growth as yet.  cefepime. Recent Klebsiella.     Elevated Troponin  -Minimal elevation thought secondary to acute medical conditions     OKSANA  -Creatinine improved to near baseline today 1.4         COPD Exacerbation  -on Steroids and nebs.  - steroids DC'd today  - on abx     Anemia  -hemoglobin stable is microcytic iron panel from recent admission shows iron deficiency anemia  -Can start iron supplementation           Essential HTN  -Hydralazine  -currently normotensive        BPH with Urinary obstruction  -Chronic chandler and Flomax  -Chandler was replaced      Discharge Follow Up Recommendations for outpatient labs/diagnostics:  Inpatient hospice    Day of Discharge     HPI:   Continues to be agitated per nursing/staff    Review of Systems  YONATHAN secondary to mental status    Vital Signs:          Physical Exam:  Constitutional: No acute distress, awake, alert  Respiratory: Faint bibasilar crackles but no wheezing today, respiratory effort normal   Cardiovascular: Irregular, no murmurs, rubs, or gallops  Gastrointestinal: Positive bowel sounds, soft, nontender, nondistended  Musculoskeletal: +1 pedal  ankle edema  Psychiatric: slightly agitated affect, cooperative  Neurologic: Oriented to person and time only, strength symmetric in all extremities,no other focal deficits    Pertinent  and/or Most Recent Results     LAB RESULTS:      Lab 07/06/22  0712 07/05/22  0635 07/04/22  0439 07/03/22  2249   WBC 7.64 8.69 9.76 8.74   HEMOGLOBIN 8.7* 10.0* 9.7* 10.1*   HEMATOCRIT 27.6* 31.3* 32.4* 33.1*   PLATELETS 179 206 178 192   NEUTROS ABS  --   --   --  7.25*   EOS ABS  --   --   --  0.00   MCV 82.1 78.4* 83.1 82.3   CRP  --   --   --  20.21*   PROCALCITONIN  --   --   --  0.53*   LACTATE  --   --   --  1.0         Lab 07/07/22  0729 07/06/22  0712 07/05/22  0636  07/04/22  0439 07/03/22 2249   SODIUM 146* 144 143 142 145   POTASSIUM 4.1 4.0 4.2 4.0 3.9   CHLORIDE 113* 111* 108* 106 107   CO2 25.0 25.0 26.0 26.0 29.0   ANION GAP 8.0 8.0 9.0 10.0 9.0   BUN 57* 54* 42* 41* 41*   CREATININE 1.38* 1.49* 1.19 1.42* 1.44*   EGFR 48.6* 44.3* 58.0* 46.9* 46.2*   GLUCOSE 103* 155* 157* 126* 115*   CALCIUM 8.9 9.0 9.2 9.1 9.0   MAGNESIUM  --   --   --   --  2.0   PHOSPHORUS  --   --   --   --  3.0         Lab 07/04/22 0439 07/03/22 2249   TOTAL PROTEIN 6.0 6.0   ALBUMIN 2.30* 2.50*   GLOBULIN 3.7 3.5   ALT (SGPT) 12 11   AST (SGOT) 13 14   BILIRUBIN 0.4 0.5   ALK PHOS 139* 139*   LIPASE  --  21         Lab 07/04/22  0439 07/04/22 0215 07/03/22 2249   PROBNP  --   --  716.0   TROPONIN T 0.028 0.021 0.035*             Lab 07/06/22  0712   IRON 78   IRON SATURATION 40   TIBC 197*   TRANSFERRIN 132*         Lab 07/03/22  2307   PH, ARTERIAL 7.450   PCO2, ARTERIAL 45.4*   PO2 ART 81.9*   FIO2 32   HCO3 ART 31.5*   BASE EXCESS ART 6.7*   CARBOXYHEMOGLOBIN 0.6     Brief Urine Lab Results  (Last result in the past 365 days)      Color   Clarity   Blood   Leuk Est   Nitrite   Protein   CREAT   Urine HCG        07/04/22 0557 Yellow   Turbid   Large (3+)   Moderate (2+)   Negative   100 mg/dL (2+)               Microbiology Results (last 10 days)     Procedure Component Value - Date/Time    Legionella Antigen, Urine - Urine, Urine, Clean Catch [[id number]]  (Normal) Collected: 07/04/22 0557    Lab Status: Final result Specimen: Urine, Clean Catch Updated: 07/04/22 1255     LEGIONELLA ANTIGEN, URINE Negative    Urine Culture - Urine, Urine, Clean Catch [[id number]]  (Normal) Collected: 07/04/22 0557    Lab Status: Final result Specimen: Urine, Clean Catch Updated: 07/05/22 0944     Urine Culture No growth    COVID PRE-OP / PRE-PROCEDURE SCREENING ORDER (NO ISOLATION) - Swab, Nasopharynx [[id number]]  (Normal) Collected: 07/04/22 0445    Lab Status: Final result Specimen: Swab from Nasopharynx  Updated: 07/04/22 0550    Narrative:      The following orders were created for panel order COVID PRE-OP / PRE-PROCEDURE SCREENING ORDER (NO ISOLATION) - Swab, Nasopharynx.  Procedure                               Abnormality         Status                     ---------                               -----------         ------                     Respiratory Panel PCR w/...[[id number]]  Normal              Final result                 Please view results for these tests on the individual orders.    Respiratory Panel PCR w/COVID-19(SARS-CoV-2) TERESA/EMI/RUSTY/PAD/COR/MAD/KATE In-House, NP Swab in UTM/VTM, 3-4 HR TAT - Swab, Nasopharynx [[id number]]  (Normal) Collected: 07/04/22 0445    Lab Status: Final result Specimen: Swab from Nasopharynx Updated: 07/04/22 0550     ADENOVIRUS, PCR Not Detected     Coronavirus 229E Not Detected     Coronavirus HKU1 Not Detected     Coronavirus NL63 Not Detected     Coronavirus OC43 Not Detected     COVID19 Not Detected     Human Metapneumovirus Not Detected     Human Rhinovirus/Enterovirus Not Detected     Influenza A PCR Not Detected     Influenza B PCR Not Detected     Parainfluenza Virus 1 Not Detected     Parainfluenza Virus 2 Not Detected     Parainfluenza Virus 3 Not Detected     Parainfluenza Virus 4 Not Detected     RSV, PCR Not Detected     Bordetella pertussis pcr Not Detected     Bordetella parapertussis PCR Not Detected     Chlamydophila pneumoniae PCR Not Detected     Mycoplasma pneumo by PCR Not Detected    Narrative:      In the setting of a positive respiratory panel with a viral infection PLUS a negative procalcitonin without other underlying concern for bacterial infection, consider observing off antibiotics or discontinuation of antibiotics and continue supportive care. If the respiratory panel is positive for atypical bacterial infection (Bordetella pertussis, Chlamydophila pneumoniae, or Mycoplasma pneumoniae), consider antibiotic de-escalation to target atypical  bacterial infection.    MRSA Screen, PCR (Inpatient) - Swab, Nares [[id number]]  (Normal) Collected: 07/04/22 0445    Lab Status: Final result Specimen: Swab from Nares Updated: 07/04/22 1108     MRSA PCR Negative    Narrative:      The negative predictive value of this diagnostic test is high and should only be used to consider de-escalating anti-MRSA therapy. A positive result may indicate colonization with MRSA and must be correlated clinically.  MRSA Negative    Blood Culture - Blood, Arm, Left [[id number]]  (Normal) Collected: 07/04/22 0050    Lab Status: Final result Specimen: Blood from Arm, Left Updated: 07/09/22 0247     Blood Culture No growth at 5 days    Blood Culture - Blood, Arm, Right [[id number]]  (Normal) Collected: 07/04/22 0045    Lab Status: Final result Specimen: Blood from Arm, Right Updated: 07/09/22 0247     Blood Culture No growth at 5 days          XR Chest 1 View    Result Date: 7/5/2022  DATE OF EXAM: 7/5/2022 4:40 PM  PROCEDURE: XR CHEST 1 VW-  INDICATIONS: crackles lower extremity edema; J96.21-Acute and chronic respiratory failure with hypoxia; N17.9-Acute kidney failure, unspecified; R77.8-Other specified abnormalities of plasma proteins; R13.10-Dysphagia, unspecified  COMPARISON: 7/3/2022  TECHNIQUE: Single radiographic view of the chest was obtained.  FINDINGS: Increasing left base consolidation and left effusion. There is also mild right base opacity. Cardiac and mediastinal contours are within normal limits. No evidence of pneumothorax.       1. Developing left base consolidation and possibly small effusion concerning for pneumonia 2. Mild heterogeneous right base opacity may represent multifocal pneumonia.  This report was finalized on 7/5/2022 4:48 PM by Yoan Kelley MD.      XR Chest 1 View    Result Date: 7/3/2022  FRONTAL VIEW OF THE CHEST CLINICAL INDICATION: Dyspnea. COMPARISON: 5/21/2022. FINDINGS: No focal consolidation, pleural effusion or pneumothorax. Cardiomediastinal  morphology is normal.     No acute cardiopulmonary abnormality. Electronically signed by:  Daniel Robledo M.D.  7/3/2022 9:43 PM Mountain Time                  Plan for Follow-up of Pending Labs/Results: no pending results    Discharge Details        Discharge Medications      Continue These Medications      Instructions Start Date   AeroChamber Plus Sudarshan-Vu misc   USE AS DIRECTED PER DOCTOR'S INSTRUCTIONS         ASK your doctor about these medications      Instructions Start Date   acetaminophen 325 MG tablet  Commonly known as: TYLENOL   650 mg, Oral, Every 6 Hours PRN      albuterol sulfate  (90 Base) MCG/ACT inhaler  Commonly known as: PROVENTIL HFA;VENTOLIN HFA;PROAIR HFA   ProAir  (90 Base) MCG/ACT Inhalation Aerosol Solution; Patient Sig: ProAir  (90 Base) MCG/ACT Inhalation Aerosol Solution INHALE ONE TO TWO PUFFS BY MOUTH EVERY 6 HOURS AS NEEDED; 8.5; 1; 06-Aug-2014; Active      ammonium lactate 12 % lotion  Commonly known as: Lac-Hydrin   Apply to skin bid      artificial tears ointment ophthalmic ointment   Both Eyes, Every 1 Hour PRN, One drop each eye BID prn      azelastine 0.1 % nasal spray  Commonly known as: ASTELIN   2 sprays, Nasal, 2 Times Daily, Use in each nostril as directed      fluticasone-salmeterol 45-21 MCG/ACT inhaler  Commonly known as: ADVAIR HFA   INHALE TWO PUFFS BY MOUTH TWICE A DAY      hydrALAZINE 25 MG tablet  Commonly known as: APRESOLINE   25 mg, Oral, Every 12 Hours Scheduled      ipratropium-albuterol 0.5-2.5 mg/3 ml nebulizer  Commonly known as: DUO-NEB   3 mL, Nebulization, Every 4 Hours PRN      levothyroxine 137 MCG tablet  Commonly known as: SYNTHROID, LEVOTHROID   137 mcg, Oral, Every Early Morning      loperamide 2 MG capsule  Commonly known as: IMODIUM   2 mg, Oral, 4 Times Daily PRN      melatonin 5 MG tablet tablet   5 mg, Oral, Nightly PRN      QUEtiapine 25 MG tablet  Commonly known as: SEROquel   25 mg, Oral, Nightly PRN      tamsulosin 0.4 MG  capsule 24 hr capsule  Commonly known as: FLOMAX   0.4 mg, Oral, Daily      triamcinolone 0.1 % ointment  Commonly known as: KENALOG   1 application, Topical, 2 Times Daily             Allergies   Allergen Reactions   • Latex Hives         Discharge Disposition:  Hospice/Medical Facility ([facility])    Diet:  Hospital:No active diet order      Activity:  as tolerated    Restrictions or Other Recommendations:  none       CODE STATUS:    Code Status and Medical Interventions:   Ordered at: 07/04/22 0215     Medical Intervention Limits:    NO intubation (DNI)     Level Of Support Discussed With:    Patient     Code Status (Patient has no pulse and is not breathing):    No CPR (Do Not Attempt to Resuscitate)     Medical Interventions (Patient has pulse or is breathing):    Limited Support       No future appointments.              Gemini Swan MD  07/09/22      Time Spent on Discharge:  I spent  45  minutes on this discharge activity which included: face-to-face encounter with the patient, reviewing the data in the system, coordination of the care with the nursing staff as well as consultants, documentation, and entering orders.

## 2022-07-10 NOTE — PLAN OF CARE
Problem: Adult Inpatient Plan of Care  Goal: Plan of Care Review  Flowsheets  Taken 7/10/2022 6658  Progress: declining  Plan of Care Reviewed With: patient  Taken 7/9/2022 8075  Outcome Evaluation:   Opens eyes to voice and pain. Haldol x`1 prn, Dilaudid x1 prn, Toradol x1 prn.

## 2022-07-10 NOTE — PROGRESS NOTES
Continued Stay Note   [facility]     Patient Name: Cooper Covarrubias  MRN: [id number]  Today's Date: 7/10/2022    Admit Date: 7/7/2022     Discharge Plan     Row Name 07/10/22 0947       Plan    Plan Inpatient hospice    Plan Comments Patient lying in bed with eyes closed.  Noted with tense body posture and intermittent shaking of arms and legs.  Nurse to give prns of Dilaudid and Versed.  No family present at time of visit.               Discharge Codes    No documentation.                     Janet Harper RN

## 2022-07-10 NOTE — PROGRESS NOTES
"Hospice Progress Note    Patient Name: Cooper Covarrubias   : 1931  Gender: male    Code Status: comfort measures    Date of Admission: 2022    Subjective:    89yoM admitted to in Hospice  for metabolic encephalopathy.    Overnight notes reviewed: Opens eyes to voice and pain. Haldol x`1 prn, Dilaudid x1 prn, Toradol x1 prn.     Today pt opens eyes to light touch; nods yes when asked if he is in pain; when asked where, he mouths \"all over\". Does appear more comfortable than yesterday but states he is not.     - PRNs:  Haldol 5mg x1  Dilaudid 1mg x2  Toradol 15mg x1  Versed 1mg x2    - Intake/Output  Intake & Output (last 3 days)        0701   07 07 07 0701  07/10 0700 07/10 0701   0700    P.O. 0 0 0     IV Piggyback        Total Intake(mL/kg) 0 (0) 0 (0) 0 (0)     Urine (mL/kg/hr) 825 (0.5) 1200 (0.7) 1100 (0.6)     Stool 0       Total Output 825 1200 1100     Net -825 -1200 -1100             Stool Unmeasured Occurrence 1 x              ROS:  Review of Systems   Unable to perform ROS: Acuity of condition       Reviewed current scheduled and prn medications for route, type, dose and frequency.     •  acetaminophen  •  bisacodyl  •  diphenhydrAMINE  •  furosemide  •  glycopyrrolate  •  haloperidol lactate  •  HYDROmorphone  •  ketorolac  •  midazolam  •  ondansetron  •  polyvinyl alcohol  •  Scopolamine    Objective:   There were no vitals taken for this visit.     PPS: 10%    Physical Exam:  General Appearance:    opens eyes with light touch   HEENT:    furrowed brow; dry MM; does open eyes - not scared   Neck:   trachea midline, no JVD   Lungs:     CTA bilat, diminished in bases; respirations regular, even and unlabored; tachypneic with touch; 4LNC    Heart:    RRR, normal S1 and S2, no M/R/G   Abdomen:     BSx4, soft, non-tender, non-distended   G/U:   FC draining clear yellow urine   MSK/Extremities:   Wasting, no edema   Pulses:   Pulses palpable and equal " "bilaterally   Skin:   Warm, dry   Neurologic:   symm facial features; tries to answer questions   Psych:  mild facial grimacing; no moaning           Encephalopathy, metabolic      Assessment/Plan:     89yoM admitted to in Hospice  for metabolic encephalopathy.     Agitation/restlessness  AMS  Anxiety  Dyspnea  Pain, nos  EOLC    Benadryl 50mg TID (3p, 11p, 7a)    Haldol 1mg TID (11a, 7p, 3a)    Scop patch    Lasix 20mg q8h - one dose now please    [place] family at bedside; support provided. Discussion at bedside regarding existential suffering (pt with  dtr when she was 18mo old; wife  11 years ago; )    UPDATE:    D/c benadyl    D/c scheduled versed    Versed pca 1mg/hr    continuously Trying to clear throat. Eyes open. States he is hurting \"all over\"    Dilaudid 0.2mg/hr    D/c scheduled dilaudid       Coordinated care with Nursing and Hospice IDT    COMPLEX PT    Discharge Disposition: EOLC    Total Visit Time: 45min+  Face to Face Time: 25min     Justification for care:  Patient meets criteria for acute in-patient care with required nursing assessment and interventions for symptoms with IV medications.      Cyndi Ash, DNP, MHA, APRN  [facility] Navigators  Hospice and Palliative Care Nurse Practitioner  07/10/22  10:41 EDT  "

## 2022-07-11 NOTE — PROGRESS NOTES
Hospice Progress Note    Patient Name: Cooper Covarrubias   : 1931  Gender: male    Code Status: comfort measures    Date of Admission: 2022    Subjective:    89yoM admitted to in Hospice  for metabolic encephalopathy.    Comfortable overnight and today with scheduled and prn medications. [place] family at bedside.     - PRNs: (s/p pcas)  Haldol 5mg x1  Dilaudid 1mg x3  Versed 1mg x1    - Intake/Output  Intake & Output (last 3 days)        07 07 0701  07/10 0700 07/10 07 07 07 07    P.O. 0 0 0     I.V. (mL/kg)   17 (0.2)     Total Intake(mL/kg) 0 (0) 0 (0) 17 (0.2)     Urine (mL/kg/hr) 1200 (0.7) 1100 (0.6) 2450 (1.4)     Stool        Total Output 1200 1100 2450     Net -7206 -0014 -5989                  ROS:  Review of Systems   Unable to perform ROS: Acuity of condition       Reviewed current scheduled and prn medications for route, type, dose and frequency.  HYDROmorphone HCl-NaCl,   MIDAZOLAM, 1 mg/hr, Last Rate: 1 mg/hr (07/10/22 1348)      •  acetaminophen  •  bisacodyl  •  diphenhydrAMINE  •  furosemide  •  glycopyrrolate  •  haloperidol lactate  •  HYDROmorphone  •  ketorolac  •  midazolam  •  naloxone  •  ondansetron  •  polyvinyl alcohol  •  Scopolamine    Objective:   There were no vitals taken for this visit.       PPS: Palliative Performance Scale score as of 2022, 11:43 EDT is 10% based on the following measures:   Ambulation: Totally bed bound  Activity and Evidence of Disease: Unable to do any work, extensive evidence of disease  Self-Care: Total care  Intake:  Mouth care only  LOC: Drowsy or coma      Physical Exam:  General Appearance:    more comfortable today   HEENT:    dry MM; eyes stay closed   Neck:   trachea midline, no JVD   Lungs:     CTA bilat, diminished in bases; respirations regular, even and unlabored    Heart:    RRR, normal S1 and S2, no M/R/G   Abdomen:     BSx4, soft, non-tender, non-distended   G/U:   FC draining  clear yellow urine   MSK/Extremities:   Wasting, no edema   Pulses:   Pulses palpable and equal bilaterally   Skin:   Warm, dry   Neurologic:   symm facial features; does not follow commands   Psych:   No facial grimacing; no moaning         Encephalopathy, metabolic      Assessment/Plan:     89yoM admitted to in Hospice 7/7 for metabolic encephalopathy.     Agitation/restlessness  AMS  Anxiety  Dyspnea  Pain, nos  EOLC    Increase dilaudid pca to 0.3mg/hr    Increase scheduled haldol to 2mg q6h --- monitor for effectiveness (increased comfort); consider increasing versed infusion if still requiring prns    Lasix 20mg q6h    Palliative oral rinse scheduled and prn    Eye gtts scheduled and prn    Continue versed pca 1mg/hr    Continue scop patch    Prns for comfort    Discussion at bedside with family    Coordinated care with Nursing and Hospice IDT    Discharge Disposition: EOLC    Total Visit Time: 40min  Face to Face Time: 15min    Justification for care:  Patient meets criteria for acute in-patient care with required nursing assessment and interventions for symptoms with IV medications.      Cyndi Ash, DNP, MHA, APRN  [facility] Care Nav[facility]ators  Hospice and Palliative Care Nurse Practitioner  07/11/22  10:36 EDT

## 2022-07-11 NOTE — PLAN OF CARE
Problem: Adult Inpatient Plan of Care  Goal: Plan of Care Review  Outcome: Ongoing, Progressing  Flowsheets  Taken 7/11/2022 1405 by Miguel Lockett, RN  Outcome Evaluation: Resting well today. Slightly responsive for short time when turning. Fx at BS. Double drip infusion with Dilaudid and Versed. Rate increased today per MD order. Seems rather peacful through shift. Continue comfort care.  Taken 7/11/2022 0221 by Leah Cheng, RN  Plan of Care Reviewed With: patient  Taken 7/10/2022 0735 by Shannon Chris, RN  Progress: declining   Goal Outcome Evaluation:              Outcome Evaluation: Resting well today. Slightly responsive for short time when turning. Fx at BS. Double drip infusion with Dilaudid and Versed. Rate increased today per MD order. Seems rather peacful through shift. Continue comfort care.

## 2022-07-11 NOTE — PROGRESS NOTES
Continued Stay Note   [place]     Patient Name: Cooper Covarrubias  MRN: [id number]  Today's Date: 7/11/2022    Admit Date: 7/7/2022     Discharge Plan     Row Name 07/11/22 1224       Plan    Plan Inpatient hospice    Plan Comments Visit to bedside. Patient with relaxed face, jaw, body posture, respirations.  Nonverbal indicators of comfort present.  Rn reassures patient that he is safe, cared for.    Final Discharge Disposition Code 51 - hospice medical facility               Discharge Codes    No documentation.                     Jeannette Greene RN

## 2022-07-11 NOTE — PLAN OF CARE
Goal Outcome Evaluation:  Plan of Care Reviewed With: patient           Outcome Evaluation: Comfort care continued

## 2022-07-11 NOTE — PROGRESS NOTES
NN rounded on patient at BS.  Family present.  No distress noted.  No other questions or concerns at this time.  NN continues to follow.

## 2022-07-12 NOTE — PROGRESS NOTES
Continued Stay Note  [facility]     Patient Name: Cooper Covarrubias  MRN: [id number]  Today's Date: 7/12/2022    Admit Date: 7/7/2022     Discharge Plan     Row Name 07/12/22 1733       Plan    Plan Inpatient hospice    Plan Comments Visit to bedside. Relaxed face, jaw, body posture, respirations noted.  Nonverbal indicators of comfort present.  Respirations are shallow.  Rn reassures patient that he is safe, cared for. Updated SERENA Ash APRN. Patient continues to require injectable medications for palliation of symptoms requiring skilled nursing intervention.  Current level of care is unable to be provided in an alternate setting.    Final Discharge Disposition Code 51 - hospice medical facility               Discharge Codes    No documentation.                     Jeannette Greene RN

## 2022-07-12 NOTE — PLAN OF CARE
Problem: Adult Inpatient Plan of Care  Goal: Plan of Care Review  Outcome: Ongoing, Progressing  Flowsheets  Taken 7/12/2022 1607 by Miguel Lockett, RN  Outcome Evaluation: Resting well. No signs discomfort. PCA increased per order. Turning q4. Continue comfort care.  Taken 7/12/2022 0217 by Leah Cheng, RN  Progress: declining  Plan of Care Reviewed With: patient   Goal Outcome Evaluation:              Outcome Evaluation: Resting well. No signs discomfort. PCA increased per order. Turning q4. Continue comfort care.

## 2022-07-12 NOTE — PROGRESS NOTES
Hospice Progress Note    Patient Name: Cooper Covarrubias   : 1931  Gender: male    Code Status: comfort measures    Date of Admission: 2022    Subjective:    89yoM admitted to in Hospice  for metabolic encephalopathy.    Overnight notes reviewed: Pt. rested through the night, apneic episodes occcurred frequently, will continue to provide comfort care    Not comfortable upon visit - facial grimacing; shaking. Dtr at bedside.    - PRNs: none    - Held: none    - Intake/Output  Intake & Output (last 3 days)        0701  07/10 0700 07/10 07 07 07 07 07 07    P.O. 0 0 0 0    I.V. (mL/kg)  17 (0.2) 30.1 (0.4)     Total Intake(mL/kg) 0 (0) 17 (0.2) 30.1 (0.4) 0 (0)    Urine (mL/kg/hr) 1100 (0.6) 2450 (1.4) 1150 (0.7)     Total Output 1100 2450 1150     [facility] -1100 -2433 -1119.9 0                   ROS:  Review of Systems   Unable to perform ROS: Acuity of condition       Reviewed current scheduled and prn medications for route, type, dose and frequency.  HYDROmorphone HCl-NaCl,   MIDAZOLAM, 1 mg/hr, Last Rate: 1 mg/hr (22 1148)      •  acetaminophen  •  bisacodyl  •  diphenhydrAMINE  •  furosemide  •  glycopyrrolate  •  haloperidol lactate  •  HYDROmorphone  •  ketorolac  •  midazolam  •  naloxone  •  ondansetron  •  polyvinyl alcohol  •  Scopolamine    Objective:   There were no vitals taken for this visit.       PPS: Palliative Performance Scale score as of 7/15/2022, 11:12 EDT is 10% based on the following measures:   Ambulation: Totally bed bound  Activity and Evidence of Disease: Unable to do any work, extensive evidence of disease  Self-Care: Total care  Intake:  Mouth care only  LOC: Drowsy or coma      Physical Exam:  General Appearance:    could be more comfortable today   HEENT:    dry MM; eyes stay closed   Neck:   trachea midline, no JVD   Lungs:     CTA bilat, diminished in bases; respirations regular, even and unlabored    Heart:    RRR,  normal S1 and S2, no M/R/G   Abdomen:     BSx4, soft, non-tender, non-distended   G/U:   FC draining clear yellow urine   MSK/Extremities:   Wasting, no edema   Pulses:   Pulses palpable and equal bilaterally   Skin:   Warm, dry   Neurologic:   symm facial features; does not follow commands   Psych:   No facial grimacing; no moaning         Encephalopathy, metabolic      Assessment/Plan:     89yoM admitted to in Hospice 7/7 for metabolic encephalopathy.     Agitation/restlessness  AMS  Anxiety  Dyspnea  Pain, nos, existential  Congestion  Constipation  Fever  EOLC    Increase dilaudid pca to 0.5mg/hr    Continue current plan of care    Monitor for changing needs    Discussion at bedside with dtr regarding existential pain/suffering; support provided; SW aware    Coordinated care with Nursing and Hospice IDT    Discharge Disposition: EOLC    Total Visit Time: 40min  Face to Face Time: 20min    Justification for care:  Patient meets criteria for acute in-patient care with required nursing assessment and interventions for symptoms with IV medications.      Cyndi Ash, RITU, MHA, APRN  [facility]  Hospice and Palliative Care Nurse Practitioner  07/12/22  12:24 EDT

## 2022-07-12 NOTE — PLAN OF CARE
Goal Outcome Evaluation:  Plan of Care Reviewed With: patient        Progress: declining  Outcome Evaluation: Pt. rested through the night, apneic episodes occcurred frequently, will continue to provide comfort care

## 2022-07-12 NOTE — PROGRESS NOTES
NN rounded on patient who appeared to be resting peacefully.  No family present at time of rounding.  NN continues to follow.

## 2022-07-13 NOTE — PROGRESS NOTES
Continued Stay Note  [facility]     Patient Name: Cooper Covarrubias  MRN: [id number]  Today's Date: 7/13/2022    Admit Date: 7/7/2022     Discharge Plan     Row Name 07/13/22 0847       Plan    Plan Inpatient hospice    Plan Comments Patient lying in bed with eyes closed.  Noted with furrowed brow.  Becomes slightly restless with oral care.  Asked nurse to give prn Dilaudid.  No family at bedside at time of visit.               Discharge Codes    No documentation.                     Janet Harper RN

## 2022-07-13 NOTE — PLAN OF CARE
Problem: Adult Inpatient Plan of Care  Goal: Plan of Care Review  Outcome: Ongoing, Progressing  Flowsheets  Taken 7/13/2022 1419 by Miguel Lockett, RN  Outcome Evaluation: Resting well. No signs distress. PCA infusing. Fx visiting though shift. Continue comfort care.  Taken 7/13/2022 0311 by Leah Cheng, RN  Progress: declining  Plan of Care Reviewed With: patient   Goal Outcome Evaluation:              Outcome Evaluation: Resting well. No signs distress. PCA infusing. Fx visiting though shift. Continue comfort care.

## 2022-07-13 NOTE — PROGRESS NOTES
Hospice Progress Note    Patient Name: Cooper Covarrubias   : 1931  Gender: male    Code Status: comfort measures    Date of Admission: 2022    Subjective:    89yoM admitted to in Hospice  for metabolic encephalopathy.    Overnight notes reviewed: Pt. rested well throughtout the shift, prn anxiety medication was given for restlessness, comfort care continued    Today upon visit pt is resting comfortably. Tolerates care; responsive to oral care. Andrew dtrs at bedside.     - PRNs:  Haldol 5mg x1  Dilaudid 1mg x1    - Held: none    - Intake/Output  Intake & Output (last 3 days)       07/10 07 07 07 07 07 07    P.O. 0 0 0 0    I.V. (mL/kg) 17 (0.2) 30.1 (0.4) 14.7 (0.2)     Total Intake(mL/kg) 17 (0.2) 30.1 (0.4) 14.7 (0.2) 0 (0)    Urine (mL/kg/hr) 2450 (1.4) 1150 (0.7) 650 (0.4)     Total Output 2450 1150 650     Net -2433 -1119.9 -635.3 0                   ROS:  Review of Systems   Unable to perform ROS: Acuity of condition       Reviewed current scheduled and prn medications for route, type, dose and frequency.  HYDROmorphone HCl-NaCl,   MIDAZOLAM, 1 mg/hr, Last Rate: 1 mg/hr (22 1148)      •  acetaminophen  •  bisacodyl  •  diphenhydrAMINE  •  furosemide  •  glycopyrrolate  •  haloperidol lactate  •  HYDROmorphone  •  ketorolac  •  midazolam  •  naloxone  •  ondansetron  •  polyvinyl alcohol  •  Scopolamine    Objective:   There were no vitals taken for this visit.     PPS: Palliative Performance Scale score as of 7/15/2022, 11:21 EDT is 10% based on the following measures:   Ambulation: Totally bed bound  Activity and Evidence of Disease: Unable to do any work, extensive evidence of disease  Self-Care: Total care  Intake:  Mouth care only  LOC: Drowsy or coma      Physical Exam:  General Appearance:    more comfortable today   HEENT:    dry MM; eyes stay closed   Neck:   trachea midline, no JVD   Lungs:     CTA bilat,  diminished in bases; respirations regular, even and unlabored    Heart:    RRR, normal S1 and S2, no M/R/G   Abdomen:     BSx4, soft, non-tender, non-distended   G/U:   FC draining clear yellow urine   MSK/Extremities:   Wasting, no edema   Pulses:   Pulses palpable and equal bilaterally   Skin:   Warm, dry   Neurologic:   symm facial features; does not follow commands   Psych:   No facial grimacing; no moaning         Encephalopathy, metabolic      Assessment/Plan:     89yoM admitted to inpt Hospice 7/7 for metabolic encephalopathy.     Agitation/restlessness  AMS  Anxiety  Dyspnea  Pain, nos, existential  Congestion  Constipation  Fever  EOLC    Dul supp nightly    Continue current plan of care     Monitor for changing needs     Discussion at bedside with dtrs; support provided     Coordinated care with Nursing and Hospice IDT    Discharge Disposition: EOLC    Total Visit Time: 30min  Face to Face Time: 15min    Justification for care:  Patient meets criteria for acute in-patient care with required nursing assessment and interventions for symptoms with IV medications.      Cyndi Ash, RITU, MHA, APRN  [facility]  Hospice and Palliative Care Nurse Practitioner  07/13/22  10:48 EDT

## 2022-07-13 NOTE — PLAN OF CARE
Goal Outcome Evaluation:  Plan of Care Reviewed With: patient        Progress: declining  Outcome Evaluation: Pt. rested well throughtout the shift, prn anxiety medication was given for restlessness, comfort care continued

## 2022-07-14 NOTE — PROGRESS NOTES
NN rounded on patient and reviewed chart.  Patient resting on RA with no signs of distress noted.  No family present at time of rounding.  No questions or concerns at this time.  NN continues to follow.

## 2022-07-14 NOTE — PLAN OF CARE
Goal Outcome Evaluation:           Progress: no change  Outcome Evaluation: Comfort measures promoted. Pt only responsive to pain. Very minimal motor. No verbal. Resp shallow/unlabored w apnea. RA. NPO. No BM. Moody drained 25 mls. Pt has appeared comfortable w scheduled med regimen. Pt has appeared peaceful and rested comfortably during the night.

## 2022-07-14 NOTE — PROGRESS NOTES
Hospice Progress Note    Patient Name: Cooper Covarrubias   : 1931  Gender: male    Code Status: comfort measures    Date of Admission: 2022    Subjective:    89yoM admitted to in Hospice  for metabolic encephalopathy.    Overnight notes reviewed: Comfort measures promoted. Pt only responsive to pain. Very minimal motor. No verbal. Resp shallow/unlabored w apnea. RA. NPO. No BM. Moody drained 25 mls. Pt has appeared comfortable w scheduled med regimen. Pt has appeared peaceful and rested comfortably during the night.    Comfortable upon visit; tolerates care; remains responsive to oral care. Thomas dtrs at bedside.    - PRNs: none    - Held: none    - Intake/Output  Intake & Output (last 3 days)        07 07 07 07 07 07 0701  07/15 0700    P.O. 0 0 0     I.V. (mL/kg) 30.1 (0.4) 14.7 (0.2) 17.7 (0.2)     Total Intake(mL/kg) 30.1 (0.4) 14.7 (0.2) 17.7 (0.2)     Urine (mL/kg/hr) 1150 (0.7) 650 (0.4) 25 (0)     Total Output 1150 650 25     Net -1119.9 -635.3 -7.3                  ROS:  Review of Systems   Unable to perform ROS: Acuity of condition       Reviewed current scheduled and prn medications for route, type, dose and frequency.  HYDROmorphone HCl-NaCl,   MIDAZOLAM, 1 mg/hr, Last Rate: 1 mg/hr (22 1148)      •  acetaminophen  •  bisacodyl  •  diphenhydrAMINE  •  furosemide  •  glycopyrrolate  •  haloperidol lactate  •  HYDROmorphone  •  ketorolac  •  midazolam  •  naloxone  •  ondansetron  •  polyvinyl alcohol  •  Scopolamine    Objective:   There were no vitals taken for this visit.       PPS: Palliative Performance Scale score as of 7/15/2022, 11:25 EDT is 10% based on the following measures:   Ambulation: Totally bed bound  Activity and Evidence of Disease: Unable to do any work, extensive evidence of disease  Self-Care: Total care  Intake:  Mouth care only  LOC: Drowsy or coma      Physical Exam: (no change)  General Appearance:     comfortable today   HEENT:    dry MM; eyes stay closed   Neck:   trachea midline, no JVD   Lungs:     CTA bilat, diminished in bases; respirations regular, even and unlabored    Heart:    RRR, normal S1 and S2, no M/R/G   Abdomen:     BSx4, soft, non-tender, non-distended   G/U:   FC draining clear yellow urine   MSK/Extremities:   Wasting, no edema   Pulses:   Pulses palpable and equal bilaterally   Skin:   Warm, dry   Neurologic:   symm facial features; does not follow commands   Psych:   No facial grimacing; no moaning         Encephalopathy, metabolic      Assessment/Plan:     89yoM admitted to inpt Hospice 7/7 for metabolic encephalopathy.     Agitation/restlessness  AMS  Anxiety  Dyspnea  Pain, nos, existential  Congestion  Constipation  Fever  EOLC    Continue current plan of care     Monitor for changing needs     Discussion at bedside with dtrs; support provided     Coordinated care with Nursing and Hospice IDT     Discharge Disposition: EOLC    Total Visit Time: 35min  Face to Face Time: 15min    Justification for care:  Patient meets criteria for acute in-patient care with required nursing assessment and interventions for symptoms with IV medications.      Cyndi Ash, RITU, MHA, APRN  [facility]  Hospice and Palliative Care Nurse Practitioner  07/14/22  11:50 EDT

## 2022-07-14 NOTE — PROGRESS NOTES
Continued Stay Note   [facility]     Patient Name: Cooper Covarrubias  MRN: [id number]  Today's Date: 7/14/2022    Admit Date: 7/7/2022     Discharge Plan     Row Name 07/14/22 1511       Plan    Plan IPU assessment    Plan Comments     7/14 PPS 10 % Pt unresponsive at this time. Last BM 7/8 bowel regiment in place. Hands are cool. PCA Versed @ 1 mg/hr infusing and dil @0.5 mg/hr infusing. PRN Dil 1 mg x 1/ 24 hr. given for pain. No family at bedside.                 Discharge Codes    No documentation.                     Gely Thapa RN

## 2022-07-14 NOTE — PLAN OF CARE
Goal Outcome Evaluation:  Plan of Care Reviewed With: patient        Progress: no change  Outcome Evaluation: comfort care continued, pt remained comfortable with scheduled meds, family at bedside

## 2022-07-15 NOTE — PROGRESS NOTES
Continued Stay Note   [facility]     Patient Name: Cooper Covarrubias  MRN: [id number]  Today's Date: 7/15/2022    Admit Date: 7/7/2022     Discharge Plan     Row Name 07/15/22 0849       Plan    Plan Inpatient hospice    Plan Comments Patient resting peacefully with eyes closed.  Face, jaw, and body relaxed and breathing even and unlabored.  No family at bedside at this time.               Discharge Codes    No documentation.                     Janet Harper RN

## 2022-07-15 NOTE — PROGRESS NOTES
Hospice Progress Note    Patient Name: Cooper Covarrubias   : 1931  Gender: male    Code Status: comfort measures    Date of Admission: 2022    Subjective:    89yoM admitted to in Hospice  for metabolic encephalopathy.    Overnight notes reviewed: Comfort measures maintained. Pt responsive to pain w minimal facial changes to turns. No verbal response. Resp unlaboerd/shallow w apnea. RA. NPO. Moderate BM during the night. Moody drained 50 mls. Pt has rested peacefully, scheduled meds kept pt comfortable. Body/jaw/face relaxed/comfortable.    Today pt is resting peacefully; minimal response to oral care, which is a change. He is comfortable. No visitors at bedside.     - PRNs:  Haldol 5mg x1  Dilaudid 1mg x1  Versed 1mg x1    - Held: none    - Intake/Output  Intake & Output (last 3 days)        07 07 07 0701  07/15 0700 07/15 07 0700    P.O. 0 0      I.V. (mL/kg) 14.7 (0.2) 17.7 (0.2)      Total Intake(mL/kg) 14.7 (0.2) 17.7 (0.2)      Urine (mL/kg/hr) 650 (0.4) 25 (0) 50 (0)     Stool   0     Total Output 650 25 50     Net -635.3 -7.3 -50             Stool Unmeasured Occurrence   1 x              ROS:  Review of Systems   Unable to perform ROS: Acuity of condition       Reviewed current scheduled and prn medications for route, type, dose and frequency.  HYDROmorphone HCl-NaCl,   MIDAZOLAM, 1 mg/hr, Last Rate: 1 mg/hr (22 1304)      •  acetaminophen  •  bisacodyl  •  diphenhydrAMINE  •  furosemide  •  glycopyrrolate  •  haloperidol lactate  •  HYDROmorphone  •  ketorolac  •  midazolam  •  naloxone  •  ondansetron  •  polyvinyl alcohol  •  Scopolamine    Objective:   There were no vitals taken for this visit.       PPS: Palliative Performance Scale score as of 7/15/2022, 14:51 EDT is 10% based on the following measures:   Ambulation: Totally bed bound  Activity and Evidence of Disease: Unable to do any work, extensive evidence of disease  Self-Care:  Total care  Intake:  Mouth care only  LOC: Drowsy or coma      Physical Exam:  General Appearance:    comfortable today; peaceful   HEENT:    dry MM; eyes stay closed; tolerates eye gtts   Neck:   trachea midline   Lungs:     CTA bilat, diminished in bases; respirations regular, even and unlabored; no audible congestion    Heart:    RRR, normal S1 and S2, no M/R/G   Abdomen:     BSx4, soft, non-tender, non-distended   G/U:   FC draining clear yellow urine   MSK/Extremities:   Wasting, no edema   Pulses:   + radial pulses   Skin:   Normothermic, dry   Neurologic:   symm facial features; does not follow commands   Psych:   No facial grimacing; no moaning           Encephalopathy, metabolic      Assessment/Plan:     89yoM admitted to in Hospice 7/7 for metabolic encephalopathy.     Agitation/restlessness  AMS  Anxiety  Dyspnea  Pain, nos, existential  Congestion  Constipation  Fever  EOLC    Continue current plan of care     Monitor for changing needs     Hospice Nursing to update family; they have Hospice contact information     Coordinated care with Nursing and Hospice IDT    Discharge Disposition: EOLC    Total Visit Time: 15min  Face to Face Time: 5min    Justification for care:  Patient meets criteria for acute in-patient care with required nursing assessment and interventions for symptoms with IV medications.      Cyndi Ash, RITU, MHA, APRN  [facility]  Hospice and Palliative Care Nurse Practitioner  07/15/22  13:35 EDT

## 2022-07-15 NOTE — PLAN OF CARE
Goal Outcome Evaluation:           Progress: no change  Outcome Evaluation: Comfort measures maintained. Pt responsive to pain w minimal facial changes to turns. No verbal response. Resp unlaboerd/shallow w apnea. RA. NPO. Moderate BM during the night. Moody drained 50 mls. Pt has rested peacefully, scheduled meds kept pt comfortable. Body/jaw/face relaxed/comfortable.

## 2022-07-15 NOTE — PROGRESS NOTES
Daily chart review and bedside rounding completed.  Patient resting on RA with no visible signs of distress.  Medications infusing.  Daughters present at bedside denied any needs.  No other questions or concerns at this time.  NN continues to follow.

## 2022-07-15 NOTE — PLAN OF CARE
Goal Outcome Evaluation:  Plan of Care Reviewed With: patient        Progress: no change  Outcome Evaluation: comfort measures continued, small bowel movement, resting comfortably with scheduled meds

## 2022-07-16 NOTE — PROGRESS NOTES
Continued Stay Note  [facility]     Patient Name: Cooper Covarrubias  MRN: [id number]  Today's Date: 7/16/2022    Admit Date: 7/7/2022     Discharge Plan     Row Name 07/16/22 1349       Plan    Plan Inpatient hospice    Plan Comments Visit to bedside. 10% pps.  No visitors present at this time.  Patient noted unresponsive, relaxed face, jaw, body posture, respirations.  Nonverbal indicators of comfort present.  Rn reassures patient that he is safe, cared for.  This patient continues to meet inpatient criteria due to requirements of injectable medications for palliation of symptoms.  His current level of care is unable to be provided in an alternate setting.    Final Discharge Disposition Code 51 - hospice medical facility               Discharge Codes    No documentation.                     Jeannette Greene RN

## 2022-07-16 NOTE — PLAN OF CARE
Goal Outcome Evaluation:  Plan of Care Reviewed With: family        Progress: declining  Outcome Evaluation: Comfort measures maintained. Pt unresponsive. Moody in place. Versed and Dilaudid PCA infusing per order. Nothing PO. Pt appears comfortable. Family at bedside and updated on patients current condition.

## 2022-07-16 NOTE — PLAN OF CARE
Goal Outcome Evaluation:           Progress: no change  Outcome Evaluation: Comfort measures maintained. Pt appears unresponsive. Resp unlabored/shallow/bradypneic w apnea. RA. NPO. Last BM 7/15. Mooyd drained 50 mls. Pt has rested peacefully. Body/jaw/face relaxed/comfortable.

## 2022-07-16 NOTE — PROGRESS NOTES
Hospice Progress Note    Code Status:   Code Status and Medical Interventions:   Ordered at: 07/07/22 1248     Code Status (Patient has no pulse and is not breathing):    No CPR (Do Not Attempt to Resuscitate)     Medical Interventions (Patient has pulse or is breathing):    Comfort Measures        Subjective   S: Medical record reviewed, follow up visit for sx mgmt. Events noted. Pt's 2 dtrs are at bedside. Report that feel he is comfortable.     ROS:   Negative except as above.    PMH/PSH/PFH: Reviewed, no changes    Allergies   Allergen Reactions   • Latex Hives       Current Facility-Administered Medications   Medication Dose Route Frequency Provider Last Rate Last Admin   • acetaminophen (TYLENOL) suppository 650 mg  650 mg Rectal Q4H PRN Cyndi Ash, APRN       • bisacodyl (DULCOLAX) suppository 10 mg  10 mg Rectal Daily PRN Cyndi Ash, APRN       • diphenhydrAMINE (BENADRYL) injection 50 mg  50 mg Intravenous Q6H PRN Cyndi Ash, APRN       • furosemide (LASIX) injection 20 mg  20 mg Intravenous Q6H PRN Cyndi Ash, APRN       • furosemide (LASIX) injection 20 mg  20 mg Intravenous Q6H Cyndi Ash, APRN   20 mg at 07/16/22 1136   • glycopyrrolate (ROBINUL) injection 0.2 mg  0.2 mg Intravenous Q6H PRN Cyndi Ash, APRN       • haloperidol lactate (HALDOL) injection 2 mg  2 mg Intravenous Q6H Cyndi Ash, APRN   2 mg at 07/16/22 1136   • haloperidol lactate (HALDOL) injection 5 mg  5 mg Intravenous Q4H PRN Cyndi Ash, APRN   5 mg at 07/14/22 1456   • HYDROmorphone (DILAUDID) injection 1 mg  1 mg Intravenous Q1H PRN Cyndi Ash, APRN   1 mg at 07/14/22 1456   • HYDROmorphone (DILAUDID) PCA 1 mg/mL syringe   Intravenous Continuous Cyndi Ash APRN   Currently Infusing at 07/16/22 9628   • ketorolac (TORADOL) injection 15 mg  15 mg Intravenous Q6H PRN Cyndi Ash, APRN       • midazolam (VERSED) injection 1 mg  1 mg Intravenous Q4H PRN  Cyndi Ash APRN   1 mg at 07/14/22 1201   • MIDAZOLAM (VERSED) syringe  1 mg/hr Intravenous Continuous Cyndi Ash APRN 1 mL/hr at 07/16/22 0758 1 mg/hr at 07/16/22 0758   • naloxone (NARCAN) injection 0.1 mg  0.1 mg Intravenous Q5 Min PRN Cyndi Ash APRN       • ondansetron (ZOFRAN) injection 4 mg  4 mg Intravenous Q6H PRN Cyndi Ash APRN       • palliative care oral rinse   Mouth/Throat Q6H Cyndi Ash APRN   Given at 07/16/22 1136   • polyvinyl alcohol (LIQUIFILM) 1.4 % ophthalmic solution 2 drop  2 drop Both Eyes Q1H PRN Cyndi Ash APRN       • polyvinyl alcohol (LIQUIFILM) 1.4 % ophthalmic solution 2 drop  2 drop Both Eyes BID Cyndi Ash APRN   2 drop at 07/16/22 1136   • scopolamine patch 1 mg/72 hr  1 patch Transdermal Q72H PRN Cyndi Ash APRN       • scopolamine patch 1 mg/72 hr  1 patch Transdermal Q72H Cyndi Ash APRN   1 patch at 07/16/22 1138     Infusions:  HYDROmorphone HCl-NaCl,   MIDAZOLAM, 1 mg/hr, Last Rate: 1 mg/hr (07/16/22 0758)        Current medication reviewed for route, type, dose and frequency and are current per MAR at time of dictation.    Objective   There were no vitals taken for this visit.    Intake/Output Summary (Last 24 hours) at 7/16/2022 1240  Last data filed at 7/16/2022 0435  Gross per 24 hour   Intake --   Output 75 ml   Net -75 ml       PPS: 10%  Physical Examination:   General Appearance:    Chronically ill appearing, actively dying   HEENT:    NC/AT, without obvious abnormality, eyelids resistance met with passive opening    Neck:   hyperextended   Lungs:     Shallow, periods of apnea 5-10 secs    Heart:    RRR   Abdomen:     Soft, bowel sounds hypoactive, scaphoid abd   Extremities:   No edema   Skin:   No mottling, warm   Neurologic:   Does not follow commands   Psych:   calm         Labs/Diagnostics/Clinical Data: Reviewed.    Assessment & Plan    Patient Active Problem List   Diagnosis   •  Asthma   • Anxiety   • Urinary incontinence   • BPH with urinary obstruction   • Nocturia   • Arthritis   • Vitamin D deficiency   • Acute exacerbation of COPD with asthma ([facility])   • Conjunctivitis   • Anxiety and depression   • Declining functional status   • Anemia   • Essential hypertension   • Hyponatremia   • SIADH (syndrome of inappropriate ADH production) ([facility])   • Acute on chronic respiratory failure ([facility])   • Elevated troponin   • OKSANA (acute kidney injury) ([facility])   • Sepsis ([facility])   • COPD exacerbation ([facility])   • Severe malnutrition ([facility])   • Acute on chronic respiratory failure with hypoxia ([facility])   • Encephalopathy, metabolic       Assessment:   Cooper Covarrubias is a 89 y.o. yo male with metabolic encephalopathy    Goals of Care: Comfort measures only  Family/Support/Spiritual: 2 dtrs at bedside, coping well.      Plan:    Pain well controlled on dilaudid infusion at 0.5mg/hr. No PRNs required.     Agitation well controlled on versed infusion at 1mg/hr with scheduled haldol 2mg q6h ATC. No PRNs required.      Lasix 20mg q6h and scop patch are controlling edema and secretions.     Justification: Patient continues to meet criteria for Acute In-patient Care due to frequent RN assessments, required 2 infusions for sx mgmt, and full IDT support of pt's family.    Time: 15 mins    Alexa Lerma MD  7/16/2022

## 2022-07-17 NOTE — SIGNIFICANT NOTE
Exam confirms with auscultation zero audible heart tones and zero audible respirations. Mr.James ULICES Covarrubias was pronounced dead at 0244 on 7/17/22.  MD notified by Patient's RN.    Daniel aVughn RN  Clinical House Supervisor  7/17/2022 03:25 EDT

## 2023-10-04 NOTE — OUTREACH NOTE
Prep Survey    Flowsheet Row Responses   [facility] facility patient discharged from? [facility]   Is LACE score < 7 ? No   Emergency Room discharge w/ pulse ox? No   Eligibility Not Eligible   What are the reasons patient is not eligible? Hospice/Pallative Care   Does the patient have one of the following disease processes/diagnoses(primary or secondary)? Other   Prep survey completed? Yes          DANYEL NINA - Registered Nurse         Chart(s)/Patient
